# Patient Record
Sex: FEMALE | Race: WHITE | Employment: FULL TIME | URBAN - METROPOLITAN AREA
[De-identification: names, ages, dates, MRNs, and addresses within clinical notes are randomized per-mention and may not be internally consistent; named-entity substitution may affect disease eponyms.]

---

## 2017-01-23 DIAGNOSIS — K21.9 GASTROESOPHAGEAL REFLUX DISEASE WITHOUT ESOPHAGITIS: ICD-10-CM

## 2017-01-23 RX ORDER — ESOMEPRAZOLE MAGNESIUM 40 MG/1
CAPSULE, DELAYED RELEASE ORAL
Qty: 60 CAPSULE | Refills: 5 | Status: SHIPPED | OUTPATIENT
Start: 2017-01-23 | End: 2018-02-05 | Stop reason: SDUPTHER

## 2017-01-26 RX ORDER — TRAMADOL HYDROCHLORIDE 50 MG/1
100 TABLET ORAL 3 TIMES DAILY
Qty: 180 TABLET | Refills: 1 | OUTPATIENT
Start: 2017-01-26 | End: 2017-03-28 | Stop reason: SDUPTHER

## 2017-02-01 ENCOUNTER — OFFICE VISIT (OUTPATIENT)
Dept: FAMILY MEDICINE CLINIC | Age: 62
End: 2017-02-01

## 2017-02-01 VITALS
WEIGHT: 164.25 LBS | RESPIRATION RATE: 16 BRPM | SYSTOLIC BLOOD PRESSURE: 190 MMHG | DIASTOLIC BLOOD PRESSURE: 94 MMHG | HEIGHT: 65 IN | HEART RATE: 86 BPM | BODY MASS INDEX: 27.36 KG/M2

## 2017-02-01 DIAGNOSIS — M70.62 TROCHANTERIC BURSITIS OF LEFT HIP: Primary | ICD-10-CM

## 2017-02-01 DIAGNOSIS — I10 ESSENTIAL HYPERTENSION: ICD-10-CM

## 2017-02-01 DIAGNOSIS — M17.11 ARTHRITIS OF RIGHT KNEE: ICD-10-CM

## 2017-02-01 DIAGNOSIS — M53.3 PAIN OF LEFT SACROILIAC JOINT: ICD-10-CM

## 2017-02-01 PROCEDURE — 99213 OFFICE O/P EST LOW 20 MIN: CPT | Performed by: FAMILY MEDICINE

## 2017-02-01 RX ORDER — OXYCODONE HYDROCHLORIDE AND ACETAMINOPHEN 5; 325 MG/1; MG/1
1 TABLET ORAL EVERY 4 HOURS PRN
Status: ON HOLD | COMMUNITY
Start: 2016-12-16 | End: 2017-05-04 | Stop reason: HOSPADM

## 2017-02-01 RX ORDER — METAXALONE 800 MG/1
800 TABLET ORAL 3 TIMES DAILY
Qty: 40 TABLET | Refills: 1 | Status: SHIPPED | OUTPATIENT
Start: 2017-02-01 | End: 2017-03-28 | Stop reason: SDUPTHER

## 2017-02-01 RX ORDER — PREDNISONE 20 MG/1
TABLET ORAL
Qty: 24 TABLET | Refills: 0 | Status: SHIPPED | OUTPATIENT
Start: 2017-02-01 | End: 2017-03-08

## 2017-02-01 ASSESSMENT — ENCOUNTER SYMPTOMS
SHORTNESS OF BREATH: 0
BACK PAIN: 1
NAUSEA: 0
EYE PAIN: 0
CONSTIPATION: 0
ABDOMINAL PAIN: 0
WHEEZING: 0
COUGH: 0
ORTHOPNEA: 0
CHEST TIGHTNESS: 0
SORE THROAT: 0

## 2017-02-01 ASSESSMENT — PATIENT HEALTH QUESTIONNAIRE - PHQ9
8. MOVING OR SPEAKING SO SLOWLY THAT OTHER PEOPLE COULD HAVE NOTICED. OR THE OPPOSITE, BEING SO FIGETY OR RESTLESS THAT YOU HAVE BEEN MOVING AROUND A LOT MORE THAN USUAL: 0
3. TROUBLE FALLING OR STAYING ASLEEP: 3
SUM OF ALL RESPONSES TO PHQ9 QUESTIONS 1 & 2: 3
7. TROUBLE CONCENTRATING ON THINGS, SUCH AS READING THE NEWSPAPER OR WATCHING TELEVISION: 1
6. FEELING BAD ABOUT YOURSELF - OR THAT YOU ARE A FAILURE OR HAVE LET YOURSELF OR YOUR FAMILY DOWN: 0
4. FEELING TIRED OR HAVING LITTLE ENERGY: 0
SUM OF ALL RESPONSES TO PHQ QUESTIONS 1-9: 7
9. THOUGHTS THAT YOU WOULD BE BETTER OFF DEAD, OR OF HURTING YOURSELF: 0
10. IF YOU CHECKED OFF ANY PROBLEMS, HOW DIFFICULT HAVE THESE PROBLEMS MADE IT FOR YOU TO DO YOUR WORK, TAKE CARE OF THINGS AT HOME, OR GET ALONG WITH OTHER PEOPLE: 0
5. POOR APPETITE OR OVEREATING: 0
2. FEELING DOWN, DEPRESSED OR HOPELESS: 2
1. LITTLE INTEREST OR PLEASURE IN DOING THINGS: 1

## 2017-02-02 ENCOUNTER — OFFICE VISIT (OUTPATIENT)
Dept: CARDIOLOGY | Age: 62
End: 2017-02-02

## 2017-02-02 VITALS — SYSTOLIC BLOOD PRESSURE: 174 MMHG | DIASTOLIC BLOOD PRESSURE: 108 MMHG | HEART RATE: 88 BPM

## 2017-02-02 DIAGNOSIS — I10 ESSENTIAL HYPERTENSION: Primary | ICD-10-CM

## 2017-02-02 DIAGNOSIS — E78.01 FAMILIAL HYPERCHOLESTEROLEMIA: ICD-10-CM

## 2017-02-02 PROCEDURE — 99213 OFFICE O/P EST LOW 20 MIN: CPT | Performed by: NUCLEAR MEDICINE

## 2017-02-02 RX ORDER — HYDROCHLOROTHIAZIDE 25 MG/1
25 TABLET ORAL EVERY OTHER DAY
Qty: 45 TABLET | Refills: 3 | Status: SHIPPED | OUTPATIENT
Start: 2017-02-02 | End: 2018-02-05 | Stop reason: SDUPTHER

## 2017-02-09 ENCOUNTER — TELEPHONE (OUTPATIENT)
Dept: FAMILY MEDICINE CLINIC | Age: 62
End: 2017-02-09

## 2017-02-09 RX ORDER — GABAPENTIN 300 MG/1
CAPSULE ORAL
Qty: 90 CAPSULE | Refills: 3 | Status: SHIPPED | OUTPATIENT
Start: 2017-02-09 | End: 2017-12-29 | Stop reason: SDUPTHER

## 2017-03-01 ENCOUNTER — TELEPHONE (OUTPATIENT)
Dept: FAMILY MEDICINE CLINIC | Age: 62
End: 2017-03-01

## 2017-03-01 DIAGNOSIS — M19.90 ARTHRITIS: Primary | ICD-10-CM

## 2017-03-08 ENCOUNTER — OFFICE VISIT (OUTPATIENT)
Dept: FAMILY MEDICINE CLINIC | Age: 62
End: 2017-03-08

## 2017-03-08 VITALS
HEART RATE: 80 BPM | SYSTOLIC BLOOD PRESSURE: 156 MMHG | DIASTOLIC BLOOD PRESSURE: 90 MMHG | RESPIRATION RATE: 10 BRPM | HEIGHT: 65 IN | WEIGHT: 165 LBS | BODY MASS INDEX: 27.49 KG/M2

## 2017-03-08 DIAGNOSIS — M54.42 ACUTE BILATERAL LOW BACK PAIN WITH BILATERAL SCIATICA: Primary | ICD-10-CM

## 2017-03-08 DIAGNOSIS — M54.41 ACUTE BILATERAL LOW BACK PAIN WITH BILATERAL SCIATICA: Primary | ICD-10-CM

## 2017-03-08 DIAGNOSIS — I10 ESSENTIAL HYPERTENSION: ICD-10-CM

## 2017-03-08 DIAGNOSIS — M25.561 ACUTE PAIN OF RIGHT KNEE: ICD-10-CM

## 2017-03-08 PROCEDURE — 99213 OFFICE O/P EST LOW 20 MIN: CPT | Performed by: FAMILY MEDICINE

## 2017-03-08 ASSESSMENT — ENCOUNTER SYMPTOMS
CHEST TIGHTNESS: 0
ABDOMINAL PAIN: 0
BACK PAIN: 1
CONSTIPATION: 0
SHORTNESS OF BREATH: 0
EYE PAIN: 0
NAUSEA: 0
SORE THROAT: 0
WHEEZING: 0
COUGH: 0

## 2017-03-20 ENCOUNTER — TELEPHONE (OUTPATIENT)
Dept: FAMILY MEDICINE CLINIC | Age: 62
End: 2017-03-20

## 2017-03-20 RX ORDER — AMOXICILLIN AND CLAVULANATE POTASSIUM 875; 125 MG/1; MG/1
1 TABLET, FILM COATED ORAL 2 TIMES DAILY
Qty: 20 TABLET | Refills: 0 | Status: SHIPPED | OUTPATIENT
Start: 2017-03-20 | End: 2017-03-30

## 2017-03-28 DIAGNOSIS — M70.62 TROCHANTERIC BURSITIS OF LEFT HIP: ICD-10-CM

## 2017-03-28 DIAGNOSIS — M53.3 PAIN OF LEFT SACROILIAC JOINT: ICD-10-CM

## 2017-03-28 RX ORDER — TRAMADOL HYDROCHLORIDE 50 MG/1
100 TABLET, COATED ORAL 3 TIMES DAILY
Qty: 180 TABLET | Refills: 1 | Status: SHIPPED | OUTPATIENT
Start: 2017-03-28 | End: 2017-05-30 | Stop reason: SDUPTHER

## 2017-03-28 RX ORDER — METAXALONE 800 MG/1
800 TABLET ORAL 3 TIMES DAILY
Qty: 40 TABLET | Refills: 1 | Status: SHIPPED | OUTPATIENT
Start: 2017-03-28 | End: 2017-05-16 | Stop reason: SDUPTHER

## 2017-04-17 ENCOUNTER — OFFICE VISIT (OUTPATIENT)
Dept: FAMILY MEDICINE CLINIC | Age: 62
End: 2017-04-17

## 2017-04-17 VITALS
BODY MASS INDEX: 27.49 KG/M2 | SYSTOLIC BLOOD PRESSURE: 150 MMHG | RESPIRATION RATE: 12 BRPM | DIASTOLIC BLOOD PRESSURE: 86 MMHG | WEIGHT: 165 LBS | HEART RATE: 68 BPM | HEIGHT: 65 IN

## 2017-04-17 DIAGNOSIS — E78.00 PURE HYPERCHOLESTEROLEMIA: ICD-10-CM

## 2017-04-17 DIAGNOSIS — M17.11 PRIMARY OSTEOARTHRITIS OF RIGHT KNEE: Primary | ICD-10-CM

## 2017-04-17 DIAGNOSIS — I10 ESSENTIAL HYPERTENSION: ICD-10-CM

## 2017-04-17 DIAGNOSIS — K21.9 GASTROESOPHAGEAL REFLUX DISEASE WITHOUT ESOPHAGITIS: ICD-10-CM

## 2017-04-17 PROBLEM — M23.329 DERANGEMENT OF MEDIAL MENISCUS, POSTERIOR HORN: Status: ACTIVE | Noted: 2017-04-17

## 2017-04-17 PROBLEM — M23.359: Status: ACTIVE | Noted: 2017-04-17

## 2017-04-17 PROBLEM — M00.061 STAPHYLOCOCCAL ARTHRITIS OF RIGHT KNEE (HCC): Status: ACTIVE | Noted: 2017-04-17

## 2017-04-17 PROBLEM — M17.9 ARTHRITIS OF KNEE, DEGENERATIVE: Status: ACTIVE | Noted: 2017-04-17

## 2017-04-17 PROCEDURE — 99214 OFFICE O/P EST MOD 30 MIN: CPT | Performed by: FAMILY MEDICINE

## 2017-04-17 ASSESSMENT — ENCOUNTER SYMPTOMS
ABDOMINAL PAIN: 0
EYE PAIN: 0
CHOKING: 0
SORE THROAT: 1
WHEEZING: 0
HOARSE VOICE: 0
NAUSEA: 0
ORTHOPNEA: 0
BELCHING: 0
COUGH: 0
CHEST TIGHTNESS: 0
CONSTIPATION: 0
SHORTNESS OF BREATH: 0

## 2017-05-11 ENCOUNTER — OFFICE VISIT (OUTPATIENT)
Dept: FAMILY MEDICINE CLINIC | Age: 62
End: 2017-05-11

## 2017-05-11 VITALS
SYSTOLIC BLOOD PRESSURE: 124 MMHG | RESPIRATION RATE: 18 BRPM | BODY MASS INDEX: 27.49 KG/M2 | DIASTOLIC BLOOD PRESSURE: 70 MMHG | WEIGHT: 165 LBS | HEIGHT: 65 IN | HEART RATE: 80 BPM

## 2017-05-11 DIAGNOSIS — E78.00 PURE HYPERCHOLESTEROLEMIA: ICD-10-CM

## 2017-05-11 DIAGNOSIS — M17.11 PRIMARY OSTEOARTHRITIS OF RIGHT KNEE: ICD-10-CM

## 2017-05-11 DIAGNOSIS — I10 ESSENTIAL HYPERTENSION: Primary | ICD-10-CM

## 2017-05-11 DIAGNOSIS — M00.9 INFECTION OF RIGHT KNEE (HCC): ICD-10-CM

## 2017-05-11 PROCEDURE — 99213 OFFICE O/P EST LOW 20 MIN: CPT | Performed by: FAMILY MEDICINE

## 2017-05-11 ASSESSMENT — ENCOUNTER SYMPTOMS
SORE THROAT: 0
EYE PAIN: 0
NAUSEA: 0
ABDOMINAL PAIN: 0
WHEEZING: 0
SHORTNESS OF BREATH: 0
CHEST TIGHTNESS: 0
ORTHOPNEA: 0
CONSTIPATION: 0
COUGH: 0

## 2017-05-16 DIAGNOSIS — M53.3 PAIN OF LEFT SACROILIAC JOINT: ICD-10-CM

## 2017-05-16 DIAGNOSIS — M70.62 TROCHANTERIC BURSITIS OF LEFT HIP: ICD-10-CM

## 2017-05-16 RX ORDER — METAXALONE 800 MG/1
800 TABLET ORAL 3 TIMES DAILY
Qty: 40 TABLET | Refills: 1 | Status: SHIPPED | OUTPATIENT
Start: 2017-05-16 | End: 2017-06-20 | Stop reason: SDUPTHER

## 2017-05-31 RX ORDER — VALSARTAN 40 MG/1
40 TABLET ORAL 2 TIMES DAILY
Qty: 60 TABLET | Refills: 5 | Status: SHIPPED | OUTPATIENT
Start: 2017-05-31 | End: 2018-02-02

## 2017-05-31 RX ORDER — TRAMADOL HYDROCHLORIDE 50 MG/1
100 TABLET, COATED ORAL 3 TIMES DAILY
Qty: 180 TABLET | Refills: 1 | Status: SHIPPED | OUTPATIENT
Start: 2017-05-31 | End: 2017-07-30 | Stop reason: SDUPTHER

## 2017-06-19 ENCOUNTER — TELEPHONE (OUTPATIENT)
Dept: FAMILY MEDICINE CLINIC | Age: 62
End: 2017-06-19

## 2017-06-19 DIAGNOSIS — I10 ESSENTIAL HYPERTENSION: Primary | ICD-10-CM

## 2017-06-20 ENCOUNTER — TELEPHONE (OUTPATIENT)
Dept: FAMILY MEDICINE CLINIC | Age: 62
End: 2017-06-20

## 2017-06-20 DIAGNOSIS — M53.3 PAIN OF LEFT SACROILIAC JOINT: ICD-10-CM

## 2017-06-20 DIAGNOSIS — M70.62 TROCHANTERIC BURSITIS OF LEFT HIP: ICD-10-CM

## 2017-06-21 RX ORDER — METAXALONE 800 MG/1
800 TABLET ORAL 3 TIMES DAILY
Qty: 100 TABLET | Refills: 1 | Status: SHIPPED | OUTPATIENT
Start: 2017-06-21 | End: 2017-08-22 | Stop reason: SDUPTHER

## 2017-07-06 ENCOUNTER — TELEPHONE (OUTPATIENT)
Dept: FAMILY MEDICINE CLINIC | Age: 62
End: 2017-07-06

## 2017-08-01 RX ORDER — TRAMADOL HYDROCHLORIDE 50 MG/1
TABLET, COATED ORAL
Qty: 180 TABLET | Refills: 1 | Status: SHIPPED | OUTPATIENT
Start: 2017-08-01 | End: 2017-10-04 | Stop reason: SDUPTHER

## 2017-08-02 ENCOUNTER — TELEPHONE (OUTPATIENT)
Dept: FAMILY MEDICINE CLINIC | Age: 62
End: 2017-08-02

## 2017-08-02 DIAGNOSIS — M19.90 ARTHRITIS: Primary | ICD-10-CM

## 2017-08-03 RX ORDER — PREDNISONE 20 MG/1
TABLET ORAL
Qty: 24 TABLET | Refills: 0 | Status: SHIPPED | OUTPATIENT
Start: 2017-08-03 | End: 2017-11-07

## 2017-08-22 DIAGNOSIS — M70.62 TROCHANTERIC BURSITIS OF LEFT HIP: ICD-10-CM

## 2017-08-22 DIAGNOSIS — M53.3 PAIN OF LEFT SACROILIAC JOINT: ICD-10-CM

## 2017-08-23 RX ORDER — METAXALONE 800 MG/1
TABLET ORAL
Qty: 100 TABLET | Refills: 0 | Status: SHIPPED | OUTPATIENT
Start: 2017-08-23 | End: 2017-09-21 | Stop reason: SDUPTHER

## 2017-09-20 ENCOUNTER — TELEPHONE (OUTPATIENT)
Dept: FAMILY MEDICINE CLINIC | Age: 62
End: 2017-09-20

## 2017-09-20 DIAGNOSIS — N30.00 ACUTE CYSTITIS WITHOUT HEMATURIA: Primary | ICD-10-CM

## 2017-09-20 RX ORDER — NITROFURANTOIN 25; 75 MG/1; MG/1
100 CAPSULE ORAL 2 TIMES DAILY
Qty: 20 CAPSULE | Refills: 0 | Status: SHIPPED | OUTPATIENT
Start: 2017-09-20 | End: 2017-09-22 | Stop reason: SINTOL

## 2017-09-21 DIAGNOSIS — M53.3 PAIN OF LEFT SACROILIAC JOINT: ICD-10-CM

## 2017-09-21 DIAGNOSIS — M70.62 TROCHANTERIC BURSITIS OF LEFT HIP: ICD-10-CM

## 2017-09-21 RX ORDER — METAXALONE 800 MG/1
TABLET ORAL
Qty: 90 TABLET | Refills: 1 | Status: SHIPPED | OUTPATIENT
Start: 2017-09-21 | End: 2017-11-27 | Stop reason: SDUPTHER

## 2017-09-22 ENCOUNTER — TELEPHONE (OUTPATIENT)
Dept: FAMILY MEDICINE CLINIC | Age: 62
End: 2017-09-22

## 2017-09-22 RX ORDER — CEFUROXIME AXETIL 250 MG/1
250 TABLET ORAL 2 TIMES DAILY
Qty: 14 TABLET | Refills: 0 | Status: SHIPPED | OUTPATIENT
Start: 2017-09-22 | End: 2017-09-29

## 2017-10-04 RX ORDER — TRAMADOL HYDROCHLORIDE 50 MG/1
TABLET, COATED ORAL
Qty: 180 TABLET | Refills: 1 | Status: SHIPPED | OUTPATIENT
Start: 2017-10-04 | End: 2017-12-03 | Stop reason: SDUPTHER

## 2017-10-11 ENCOUNTER — TELEPHONE (OUTPATIENT)
Dept: FAMILY MEDICINE CLINIC | Age: 62
End: 2017-10-11

## 2017-10-11 RX ORDER — PREDNISONE 20 MG/1
TABLET ORAL
Qty: 24 TABLET | Refills: 0 | Status: SHIPPED | OUTPATIENT
Start: 2017-10-11 | End: 2017-11-07

## 2017-10-13 NOTE — TELEPHONE ENCOUNTER
Date of last visit:  10/11/2017  Date of next visit:  Visit date not found    Requested Prescriptions     Pending Prescriptions Disp Refills    ipratropium (ATROVENT) 0.06 % nasal spray 1 Bottle 5     Si sprays by Nasal route 2 times daily

## 2017-10-14 RX ORDER — IPRATROPIUM BROMIDE 42 UG/1
2 SPRAY, METERED NASAL 2 TIMES DAILY
Qty: 1 BOTTLE | Refills: 5 | Status: SHIPPED | OUTPATIENT
Start: 2017-10-14 | End: 2018-09-11 | Stop reason: SDUPTHER

## 2017-11-07 ENCOUNTER — OFFICE VISIT (OUTPATIENT)
Dept: FAMILY MEDICINE CLINIC | Age: 62
End: 2017-11-07

## 2017-11-07 VITALS
BODY MASS INDEX: 26.52 KG/M2 | HEIGHT: 66 IN | HEART RATE: 68 BPM | RESPIRATION RATE: 14 BRPM | SYSTOLIC BLOOD PRESSURE: 130 MMHG | DIASTOLIC BLOOD PRESSURE: 70 MMHG | WEIGHT: 165 LBS

## 2017-11-07 DIAGNOSIS — M51.16 LUMBAR DISC DISEASE WITH RADICULOPATHY: ICD-10-CM

## 2017-11-07 DIAGNOSIS — E78.00 PURE HYPERCHOLESTEROLEMIA: ICD-10-CM

## 2017-11-07 DIAGNOSIS — M17.11 PRIMARY OSTEOARTHRITIS OF RIGHT KNEE: ICD-10-CM

## 2017-11-07 DIAGNOSIS — K21.9 GASTROESOPHAGEAL REFLUX DISEASE WITHOUT ESOPHAGITIS: ICD-10-CM

## 2017-11-07 DIAGNOSIS — I10 ESSENTIAL HYPERTENSION: Primary | ICD-10-CM

## 2017-11-07 PROCEDURE — 99213 OFFICE O/P EST LOW 20 MIN: CPT | Performed by: FAMILY MEDICINE

## 2017-11-07 PROCEDURE — 96372 THER/PROPH/DIAG INJ SC/IM: CPT | Performed by: FAMILY MEDICINE

## 2017-11-07 RX ORDER — METHYLPREDNISOLONE ACETATE 80 MG/ML
80 INJECTION, SUSPENSION INTRA-ARTICULAR; INTRALESIONAL; INTRAMUSCULAR; SOFT TISSUE ONCE
Status: COMPLETED | OUTPATIENT
Start: 2017-11-07 | End: 2017-11-07

## 2017-11-07 RX ORDER — METHYLPREDNISOLONE ACETATE 40 MG/ML
40 INJECTION, SUSPENSION INTRA-ARTICULAR; INTRALESIONAL; INTRAMUSCULAR; SOFT TISSUE ONCE
Status: COMPLETED | OUTPATIENT
Start: 2017-11-07 | End: 2017-11-07

## 2017-11-07 RX ORDER — HYDROCODONE BITARTRATE AND ACETAMINOPHEN 5; 325 MG/1; MG/1
TABLET ORAL
Refills: 0 | COMMUNITY
Start: 2017-10-30 | End: 2019-02-07

## 2017-11-07 RX ADMIN — METHYLPREDNISOLONE ACETATE 40 MG: 40 INJECTION, SUSPENSION INTRA-ARTICULAR; INTRALESIONAL; INTRAMUSCULAR; SOFT TISSUE at 13:10

## 2017-11-07 RX ADMIN — METHYLPREDNISOLONE ACETATE 80 MG: 80 INJECTION, SUSPENSION INTRA-ARTICULAR; INTRALESIONAL; INTRAMUSCULAR; SOFT TISSUE at 13:10

## 2017-11-07 ASSESSMENT — ENCOUNTER SYMPTOMS
SORE THROAT: 0
ORTHOPNEA: 0
EYE PAIN: 0
WHEEZING: 0
COUGH: 0
CHEST TIGHTNESS: 0
SHORTNESS OF BREATH: 0
CONSTIPATION: 0
NAUSEA: 0
ABDOMINAL PAIN: 0

## 2017-11-27 DIAGNOSIS — M53.3 PAIN OF LEFT SACROILIAC JOINT: ICD-10-CM

## 2017-11-27 DIAGNOSIS — M70.62 TROCHANTERIC BURSITIS OF LEFT HIP: ICD-10-CM

## 2017-11-27 RX ORDER — METAXALONE 800 MG/1
TABLET ORAL
Qty: 90 TABLET | Refills: 3 | Status: SHIPPED | OUTPATIENT
Start: 2017-11-27 | End: 2022-05-25

## 2017-11-27 NOTE — TELEPHONE ENCOUNTER
Date of last visit:  11/7/2017  Date of next visit:  Visit date not found    Requested Prescriptions     Signed Prescriptions Disp Refills    metaxalone (SKELAXIN) 800 MG tablet 90 tablet 3     Sig: TAKE 1 TABLET BY MOUTH 3 TIMES DAILY     Authorizing Provider: Elia Gray     Ordering User: FABBY KINCAID     SENT OVER SCRIPT PER VERBAL ORDER FROM DR BUCIO TO [facility] ON [address]

## 2017-11-29 ENCOUNTER — TELEPHONE (OUTPATIENT)
Dept: FAMILY MEDICINE CLINIC | Age: 62
End: 2017-11-29

## 2017-11-29 DIAGNOSIS — N76.0 ACUTE VAGINITIS: Primary | ICD-10-CM

## 2017-11-29 DIAGNOSIS — M19.90 ARTHRITIS: ICD-10-CM

## 2017-11-29 RX ORDER — FLUCONAZOLE 150 MG/1
TABLET ORAL
Qty: 2 TABLET | Refills: 1 | Status: SHIPPED | OUTPATIENT
Start: 2017-11-29 | End: 2018-02-09 | Stop reason: ALTCHOICE

## 2017-11-29 RX ORDER — PREDNISONE 20 MG/1
TABLET ORAL
Qty: 24 TABLET | Refills: 0 | Status: SHIPPED | OUTPATIENT
Start: 2017-11-29 | End: 2018-02-02

## 2017-12-02 ENCOUNTER — HOSPITAL ENCOUNTER (OUTPATIENT)
Age: 62
Discharge: HOME | End: 2017-12-02
Payer: COMMERCIAL

## 2017-12-02 DIAGNOSIS — I10 ESSENTIAL HYPERTENSION: ICD-10-CM

## 2017-12-02 DIAGNOSIS — M17.11 PRIMARY OSTEOARTHRITIS OF RIGHT KNEE: ICD-10-CM

## 2017-12-02 DIAGNOSIS — E78.00 PURE HYPERCHOLESTEROLEMIA: ICD-10-CM

## 2017-12-02 LAB
ALBUMIN SERPL-MCNC: 3.6 G/DL (ref 3.5–5.1)
ALP BLD-CCNC: 55 U/L (ref 38–126)
ALT SERPL-CCNC: 16 U/L (ref 11–66)
ANION GAP SERPL CALCULATED.3IONS-SCNC: 14 MEQ/L (ref 8–16)
ANISOCYTOSIS: ABNORMAL
AST SERPL-CCNC: 28 U/L (ref 5–40)
BASOPHILS # BLD: 0.8 %
BASOPHILS ABSOLUTE: 0 THOU/MM3 (ref 0–0.1)
BILIRUB SERPL-MCNC: 0.2 MG/DL (ref 0.3–1.2)
BUN BLDV-MCNC: 27 MG/DL (ref 7–22)
C-REACTIVE PROTEIN: 0.4 MG/DL (ref 0–1)
CALCIUM SERPL-MCNC: 8.7 MG/DL (ref 8.5–10.5)
CHLORIDE BLD-SCNC: 97 MEQ/L (ref 98–111)
CHOLESTEROL, TOTAL: 255 MG/DL (ref 100–199)
CO2: 27 MEQ/L (ref 23–33)
CREAT SERPL-MCNC: 0.6 MG/DL (ref 0.4–1.2)
EOSINOPHIL # BLD: 2.4 %
EOSINOPHILS ABSOLUTE: 0.1 THOU/MM3 (ref 0–0.4)
GFR SERPL CREATININE-BSD FRML MDRD: > 90 ML/MIN/1.73M2
GLUCOSE BLD-MCNC: 81 MG/DL (ref 70–108)
HCT VFR BLD CALC: 35.5 % (ref 37–47)
HDLC SERPL-MCNC: 110 MG/DL
HEMOGLOBIN: 11.7 GM/DL (ref 12–16)
LDL CHOLESTEROL CALCULATED: 131 MG/DL
LYMPHOCYTES # BLD: 29.6 %
LYMPHOCYTES ABSOLUTE: 1.3 THOU/MM3 (ref 1–4.8)
MCH RBC QN AUTO: 28.7 PG (ref 27–31)
MCHC RBC AUTO-ENTMCNC: 32.9 GM/DL (ref 33–37)
MCV RBC AUTO: 87.1 FL (ref 81–99)
MONOCYTES # BLD: 11.1 %
MONOCYTES ABSOLUTE: 0.5 THOU/MM3 (ref 0.4–1.3)
NUCLEATED RED BLOOD CELLS: 0 /100 WBC
PDW BLD-RTO: 15.3 % (ref 11.5–14.5)
PLATELET # BLD: 291 THOU/MM3 (ref 130–400)
PMV BLD AUTO: 7.3 MCM (ref 7.4–10.4)
POTASSIUM SERPL-SCNC: 3.9 MEQ/L (ref 3.5–5.2)
RBC # BLD: 4.08 MILL/MM3 (ref 4.2–5.4)
RHEUMATOID FACTOR: < 10 IU/ML (ref 0–13)
SEDIMENTATION RATE, ERYTHROCYTE: 3 MM/HR (ref 0–20)
SEG NEUTROPHILS: 56.1 %
SEGMENTED NEUTROPHILS ABSOLUTE COUNT: 2.5 THOU/MM3 (ref 1.8–7.7)
SODIUM BLD-SCNC: 138 MEQ/L (ref 135–145)
TOTAL PROTEIN: 6 G/DL (ref 6.1–8)
TRIGL SERPL-MCNC: 68 MG/DL (ref 0–199)
TSH SERPL DL<=0.05 MIU/L-ACNC: 2.48 UIU/ML (ref 0.4–4.2)
URIC ACID: 1.7 MG/DL (ref 2.4–5.7)
WBC # BLD: 4.5 THOU/MM3 (ref 4.8–10.8)

## 2017-12-02 PROCEDURE — 84550 ASSAY OF BLOOD/URIC ACID: CPT

## 2017-12-02 PROCEDURE — 86430 RHEUMATOID FACTOR TEST QUAL: CPT

## 2017-12-02 PROCEDURE — 86140 C-REACTIVE PROTEIN: CPT

## 2017-12-02 PROCEDURE — 80061 LIPID PANEL: CPT

## 2017-12-02 PROCEDURE — 85651 RBC SED RATE NONAUTOMATED: CPT

## 2017-12-02 PROCEDURE — 36415 COLL VENOUS BLD VENIPUNCTURE: CPT

## 2017-12-02 PROCEDURE — 80050 GENERAL HEALTH PANEL: CPT

## 2017-12-04 ENCOUNTER — TELEPHONE (OUTPATIENT)
Dept: FAMILY MEDICINE CLINIC | Age: 62
End: 2017-12-04

## 2017-12-05 RX ORDER — TRAMADOL HYDROCHLORIDE 50 MG/1
TABLET, COATED ORAL
Qty: 180 TABLET | Refills: 1 | Status: SHIPPED | OUTPATIENT
Start: 2017-12-05 | End: 2018-01-05 | Stop reason: SDUPTHER

## 2017-12-29 DIAGNOSIS — I10 ESSENTIAL HYPERTENSION: ICD-10-CM

## 2017-12-29 RX ORDER — GABAPENTIN 300 MG/1
300 CAPSULE ORAL 2 TIMES DAILY
Qty: 60 CAPSULE | Refills: 5 | Status: SHIPPED | OUTPATIENT
Start: 2017-12-29 | End: 2019-03-13 | Stop reason: SDUPTHER

## 2017-12-29 RX ORDER — POTASSIUM CHLORIDE 750 MG/1
10 TABLET, EXTENDED RELEASE ORAL DAILY
Qty: 30 TABLET | Refills: 5 | Status: SHIPPED | OUTPATIENT
Start: 2017-12-29 | End: 2018-02-02 | Stop reason: SDUPTHER

## 2017-12-29 NOTE — TELEPHONE ENCOUNTER
Patient called in and requested #60 refills of:    Gabapentin 300mg, 1 tablet BID  Klor-Con 10meq, 1 tablet daily    Please send to [facility] [address].

## 2018-01-05 ENCOUNTER — TELEPHONE (OUTPATIENT)
Dept: FAMILY MEDICINE CLINIC | Age: 63
End: 2018-01-05

## 2018-01-05 DIAGNOSIS — M25.561 CHRONIC PAIN OF BOTH KNEES: Primary | ICD-10-CM

## 2018-01-05 DIAGNOSIS — G89.29 CHRONIC PAIN OF BOTH KNEES: Primary | ICD-10-CM

## 2018-01-05 DIAGNOSIS — M25.562 CHRONIC PAIN OF BOTH KNEES: Primary | ICD-10-CM

## 2018-01-05 DIAGNOSIS — M47.812 DEGENERATIVE JOINT DISEASE OF CERVICAL AND LUMBAR SPINE: Chronic | ICD-10-CM

## 2018-01-05 DIAGNOSIS — M47.816 DEGENERATIVE JOINT DISEASE OF CERVICAL AND LUMBAR SPINE: Chronic | ICD-10-CM

## 2018-01-05 RX ORDER — CEFDINIR 300 MG/1
300 CAPSULE ORAL 2 TIMES DAILY
Qty: 20 CAPSULE | Refills: 0 | Status: SHIPPED | OUTPATIENT
Start: 2018-01-05 | End: 2018-01-15

## 2018-01-05 NOTE — TELEPHONE ENCOUNTER
Date of last visit:  11/7/2017  Date of next visit:  Visit date not found    Requested Prescriptions     Pending Prescriptions Disp Refills    traMADol (ULTRAM) 50 MG tablet 180 tablet 1     Sig: TAKE 2 TABLETS BY MOUTH 3 TIMES DAILY.      Signed Prescriptions Disp Refills    cefdinir (OMNICEF) 300 MG capsule 20 capsule 0     Sig: Take 1 capsule by mouth 2 times daily for 10 days     Authorizing Provider: Cuca Ingram     Ordering User: Mynor Hernandez over the [address] for a Sinus Infection per verbal order from Dr Ivory Rea and phoned in the [facility]

## 2018-01-08 PROBLEM — M47.816 DEGENERATIVE JOINT DISEASE OF CERVICAL AND LUMBAR SPINE: Chronic | Status: ACTIVE | Noted: 2018-01-08

## 2018-01-08 PROBLEM — M47.812 DEGENERATIVE JOINT DISEASE OF CERVICAL AND LUMBAR SPINE: Chronic | Status: ACTIVE | Noted: 2018-01-08

## 2018-01-08 RX ORDER — TRAMADOL HYDROCHLORIDE 50 MG/1
TABLET ORAL
Qty: 180 TABLET | Refills: 1 | OUTPATIENT
Start: 2018-01-08 | End: 2018-02-05 | Stop reason: SDUPTHER

## 2018-02-02 ENCOUNTER — OFFICE VISIT (OUTPATIENT)
Dept: FAMILY MEDICINE CLINIC | Age: 63
End: 2018-02-02

## 2018-02-02 VITALS
DIASTOLIC BLOOD PRESSURE: 84 MMHG | HEIGHT: 66 IN | RESPIRATION RATE: 10 BRPM | WEIGHT: 168 LBS | BODY MASS INDEX: 27 KG/M2 | HEART RATE: 64 BPM | SYSTOLIC BLOOD PRESSURE: 144 MMHG

## 2018-02-02 DIAGNOSIS — I10 ESSENTIAL HYPERTENSION: Primary | ICD-10-CM

## 2018-02-02 DIAGNOSIS — K21.9 GASTROESOPHAGEAL REFLUX DISEASE WITHOUT ESOPHAGITIS: ICD-10-CM

## 2018-02-02 DIAGNOSIS — R60.9 PERIPHERAL EDEMA: ICD-10-CM

## 2018-02-02 PROCEDURE — 99213 OFFICE O/P EST LOW 20 MIN: CPT | Performed by: FAMILY MEDICINE

## 2018-02-02 RX ORDER — VALSARTAN 40 MG/1
40 TABLET ORAL DAILY
Qty: 60 TABLET | Refills: 5 | Status: SHIPPED | OUTPATIENT
Start: 2018-02-02 | End: 2018-10-15 | Stop reason: ALTCHOICE

## 2018-02-02 RX ORDER — FUROSEMIDE 20 MG/1
20 TABLET ORAL DAILY PRN
Qty: 60 TABLET | Refills: 1 | Status: SHIPPED | OUTPATIENT
Start: 2018-02-02 | End: 2018-05-23 | Stop reason: SDUPTHER

## 2018-02-02 RX ORDER — POTASSIUM CHLORIDE 750 MG/1
10 TABLET, EXTENDED RELEASE ORAL DAILY
Qty: 30 TABLET | Refills: 5 | Status: SHIPPED | OUTPATIENT
Start: 2018-02-02 | End: 2019-03-24 | Stop reason: SDUPTHER

## 2018-02-02 ASSESSMENT — ENCOUNTER SYMPTOMS
SHORTNESS OF BREATH: 0
CONSTIPATION: 0
COUGH: 0
EYE PAIN: 0
ABDOMINAL PAIN: 0
NAUSEA: 0
CHEST TIGHTNESS: 0
WHEEZING: 0
SORE THROAT: 0

## 2018-02-02 ASSESSMENT — PATIENT HEALTH QUESTIONNAIRE - PHQ9
SUM OF ALL RESPONSES TO PHQ QUESTIONS 1-9: 1
2. FEELING DOWN, DEPRESSED OR HOPELESS: 1
1. LITTLE INTEREST OR PLEASURE IN DOING THINGS: 0
SUM OF ALL RESPONSES TO PHQ9 QUESTIONS 1 & 2: 1

## 2018-02-02 NOTE — PROGRESS NOTES
5' 6\" (1.676 m)   Wt 168 lb (76.2 kg)   Breastfeeding? No   BMI 27.12 kg/m²   Objective:   Physical Exam   Constitutional: She is oriented to person, place, and time. She appears well-developed and well-nourished. HENT:   Head: Normocephalic and atraumatic. Right Ear: External ear normal.   Left Ear: External ear normal.   Nose: Nose normal.   Mouth/Throat: Oropharynx is clear and moist.   Eyes: Conjunctivae and EOM are normal. Pupils are equal, round, and reactive to light. No scleral icterus. Neck: Normal range of motion. Neck supple. No JVD present. No thyromegaly present. Cardiovascular: Normal rate, regular rhythm, normal heart sounds and intact distal pulses. Pulmonary/Chest: Effort normal and breath sounds normal. She has no wheezes. She has no rales. Abdominal: Soft. Bowel sounds are normal. She exhibits no distension and no mass. There is no tenderness. Musculoskeletal: Normal range of motion. She exhibits no tenderness. Edema:   2  to  3 + edema. Lymphadenopathy:     She has no cervical adenopathy. Neurological: She is alert and oriented to person, place, and time. She has normal reflexes. No cranial nerve deficit. Skin: Skin is warm and dry. No rash noted. Psychiatric: She has a normal mood and affect. Nursing note and vitals reviewed. Assessment:      1. Essential hypertension  valsartan (DIOVAN) 40 MG tablet    furosemide (LASIX) 20 MG tablet    potassium chloride (KLOR-CON M) 10 MEQ extended release tablet   2. Gastroesophageal reflux disease without esophagitis     3.  Peripheral edema           Plan:      Current Outpatient Prescriptions   Medication Sig Dispense Refill    valsartan (DIOVAN) 40 MG tablet Take 1 tablet by mouth daily 60 tablet 5    furosemide (LASIX) 20 MG tablet Take 1 tablet by mouth daily as needed (leg  edema) 60 tablet 1    potassium chloride (KLOR-CON M) 10 MEQ extended release tablet Take 1 tablet by mouth daily 30 tablet 5    traMADol

## 2018-02-05 DIAGNOSIS — G89.29 CHRONIC PAIN OF BOTH KNEES: ICD-10-CM

## 2018-02-05 DIAGNOSIS — M47.812 DEGENERATIVE JOINT DISEASE OF CERVICAL AND LUMBAR SPINE: Chronic | ICD-10-CM

## 2018-02-05 DIAGNOSIS — I10 ESSENTIAL HYPERTENSION: Primary | ICD-10-CM

## 2018-02-05 DIAGNOSIS — K21.9 GASTROESOPHAGEAL REFLUX DISEASE WITHOUT ESOPHAGITIS: ICD-10-CM

## 2018-02-05 DIAGNOSIS — M25.562 CHRONIC PAIN OF BOTH KNEES: ICD-10-CM

## 2018-02-05 DIAGNOSIS — M47.816 DEGENERATIVE JOINT DISEASE OF CERVICAL AND LUMBAR SPINE: Chronic | ICD-10-CM

## 2018-02-05 DIAGNOSIS — M25.561 CHRONIC PAIN OF BOTH KNEES: ICD-10-CM

## 2018-02-06 RX ORDER — ESOMEPRAZOLE MAGNESIUM 40 MG/1
CAPSULE, DELAYED RELEASE ORAL
Qty: 60 CAPSULE | Refills: 5 | Status: SHIPPED | OUTPATIENT
Start: 2018-02-06 | End: 2018-08-20 | Stop reason: SDUPTHER

## 2018-02-06 RX ORDER — TRAMADOL HYDROCHLORIDE 50 MG/1
TABLET ORAL
Qty: 180 TABLET | Refills: 1 | Status: SHIPPED | OUTPATIENT
Start: 2018-02-06 | End: 2018-04-06 | Stop reason: SDUPTHER

## 2018-02-06 RX ORDER — HYDROCHLOROTHIAZIDE 25 MG/1
25 TABLET ORAL EVERY OTHER DAY
Qty: 45 TABLET | Refills: 3 | Status: SHIPPED | OUTPATIENT
Start: 2018-02-06 | End: 2019-01-07 | Stop reason: SDUPTHER

## 2018-02-09 ENCOUNTER — OFFICE VISIT (OUTPATIENT)
Dept: CARDIOLOGY CLINIC | Age: 63
End: 2018-02-09
Payer: COMMERCIAL

## 2018-02-09 VITALS
WEIGHT: 172.6 LBS | DIASTOLIC BLOOD PRESSURE: 86 MMHG | BODY MASS INDEX: 27.86 KG/M2 | HEART RATE: 92 BPM | SYSTOLIC BLOOD PRESSURE: 148 MMHG

## 2018-02-09 DIAGNOSIS — E78.01 FAMILIAL HYPERCHOLESTEROLEMIA: ICD-10-CM

## 2018-02-09 DIAGNOSIS — I10 ESSENTIAL HYPERTENSION: Primary | ICD-10-CM

## 2018-02-09 PROCEDURE — 99213 OFFICE O/P EST LOW 20 MIN: CPT | Performed by: NUCLEAR MEDICINE

## 2018-02-09 NOTE — PROGRESS NOTES
Patient here for 1 yr fu for HTN  She is taking lasix every 3 days for SUSAN and HCTZ every other day
ECHOCARDIOGRAM  08/25/2005    EF 60% C TRACE MR AND MILD TR    UPPER GASTROINTESTINAL ENDOSCOPY  2000    [facility]     Family History   Problem Relation Age of Onset    Heart Disease Mother     Hypertension Mother     Heart Disease Father     Hypertension Father     Stroke Father      Social History   Substance Use Topics    Smoking status: Former Smoker     Years: 3.00     Quit date: 11/12/1975    Smokeless tobacco: Never Used    Alcohol use No      Current Outpatient Prescriptions   Medication Sig Dispense Refill    traMADol (ULTRAM) 50 MG tablet TAKE 2 TABLETS BY MOUTH 3 TIMES DAILY. 180 tablet 1    hydrochlorothiazide (HYDRODIURIL) 25 MG tablet Take 1 tablet by mouth every other day 45 tablet 3    esomeprazole (NEXIUM) 40 MG delayed release capsule TAKE 1 CAPSULE BY MOUTH 2 TIMES DAILY. 60 capsule 5    valsartan (DIOVAN) 40 MG tablet Take 1 tablet by mouth daily 60 tablet 5    furosemide (LASIX) 20 MG tablet Take 1 tablet by mouth daily as needed (leg  edema) 60 tablet 1    potassium chloride (KLOR-CON M) 10 MEQ extended release tablet Take 1 tablet by mouth daily (Patient taking differently: Take 10 mEq by mouth daily Taking every 3-4 days) 30 tablet 5    gabapentin (NEURONTIN) 300 MG capsule Take 1 capsule by mouth 2 times daily. (Patient taking differently: Take 300 mg by mouth nightly .) 60 capsule 5    oxaprozin (DAYPRO) 600 MG tablet Take 1 tablet by mouth 2 times daily 60 tablet 3    metaxalone (SKELAXIN) 800 MG tablet TAKE 1 TABLET BY MOUTH 3 TIMES DAILY 90 tablet 3    HYDROcodone-acetaminophen (NORCO) 5-325 MG per tablet TAKE 1 OR 2 TABLETS EVERY 6-8 HOURS AS NEEDED FOR PAIN  0    ipratropium (ATROVENT) 0.06 % nasal spray 2 sprays by Nasal route 2 times daily 1 Bottle 5    nitroGLYCERIN (NITROLINGUAL) 0.4 MG/SPRAY spray Place 1 spray under the tongue every 5 minutes as needed for Chest pain. 1 Bottle 1    Cholecalciferol (VITAMIN D3) 3000 UNITS TABS Take 5,000 Units by mouth daily.

## 2018-02-16 ENCOUNTER — TELEPHONE (OUTPATIENT)
Dept: FAMILY MEDICINE CLINIC | Age: 63
End: 2018-02-16

## 2018-02-16 DIAGNOSIS — M19.90 ARTHRITIS: ICD-10-CM

## 2018-02-16 RX ORDER — PREDNISONE 20 MG/1
TABLET ORAL
Qty: 24 TABLET | Refills: 0 | Status: SHIPPED | OUTPATIENT
Start: 2018-02-16 | End: 2018-08-13 | Stop reason: ALTCHOICE

## 2018-02-21 ENCOUNTER — TELEPHONE (OUTPATIENT)
Dept: CARDIOLOGY CLINIC | Age: 63
End: 2018-02-21

## 2018-04-06 DIAGNOSIS — M25.561 CHRONIC PAIN OF BOTH KNEES: ICD-10-CM

## 2018-04-06 DIAGNOSIS — M25.562 CHRONIC PAIN OF BOTH KNEES: ICD-10-CM

## 2018-04-06 DIAGNOSIS — M47.816 DEGENERATIVE JOINT DISEASE OF CERVICAL AND LUMBAR SPINE: Chronic | ICD-10-CM

## 2018-04-06 DIAGNOSIS — G89.29 CHRONIC PAIN OF BOTH KNEES: ICD-10-CM

## 2018-04-06 DIAGNOSIS — M47.812 DEGENERATIVE JOINT DISEASE OF CERVICAL AND LUMBAR SPINE: Chronic | ICD-10-CM

## 2018-04-07 RX ORDER — TRAMADOL HYDROCHLORIDE 50 MG/1
TABLET, COATED ORAL
Qty: 180 TABLET | Refills: 1 | Status: SHIPPED | OUTPATIENT
Start: 2018-04-07 | End: 2018-06-04 | Stop reason: SDUPTHER

## 2018-05-15 ENCOUNTER — TELEPHONE (OUTPATIENT)
Dept: FAMILY MEDICINE CLINIC | Age: 63
End: 2018-05-15

## 2018-05-15 DIAGNOSIS — M54.42 ACUTE MIDLINE LOW BACK PAIN WITH LEFT-SIDED SCIATICA: Primary | ICD-10-CM

## 2018-05-16 RX ORDER — PREDNISONE 20 MG/1
TABLET ORAL
Qty: 24 TABLET | Refills: 0 | Status: SHIPPED | OUTPATIENT
Start: 2018-05-16 | End: 2018-08-13 | Stop reason: ALTCHOICE

## 2018-05-23 DIAGNOSIS — I10 ESSENTIAL HYPERTENSION: ICD-10-CM

## 2018-05-24 RX ORDER — FUROSEMIDE 20 MG/1
20 TABLET ORAL DAILY PRN
Qty: 60 TABLET | Refills: 1 | Status: SHIPPED | OUTPATIENT
Start: 2018-05-24 | End: 2018-06-04 | Stop reason: SDUPTHER

## 2018-06-04 DIAGNOSIS — M47.812 DEGENERATIVE JOINT DISEASE OF CERVICAL AND LUMBAR SPINE: Chronic | ICD-10-CM

## 2018-06-04 DIAGNOSIS — G89.29 CHRONIC PAIN OF BOTH KNEES: ICD-10-CM

## 2018-06-04 DIAGNOSIS — I10 ESSENTIAL HYPERTENSION: ICD-10-CM

## 2018-06-04 DIAGNOSIS — M25.562 CHRONIC PAIN OF BOTH KNEES: ICD-10-CM

## 2018-06-04 DIAGNOSIS — M47.816 DEGENERATIVE JOINT DISEASE OF CERVICAL AND LUMBAR SPINE: Chronic | ICD-10-CM

## 2018-06-04 DIAGNOSIS — M25.561 CHRONIC PAIN OF BOTH KNEES: ICD-10-CM

## 2018-06-05 RX ORDER — FUROSEMIDE 20 MG/1
20 TABLET ORAL DAILY PRN
Qty: 60 TABLET | Refills: 1 | Status: SHIPPED | OUTPATIENT
Start: 2018-06-05 | End: 2019-01-07 | Stop reason: SDUPTHER

## 2018-06-05 RX ORDER — TRAMADOL HYDROCHLORIDE 50 MG/1
TABLET, COATED ORAL
Qty: 180 TABLET | Refills: 1 | Status: SHIPPED | OUTPATIENT
Start: 2018-06-05 | End: 2018-08-08 | Stop reason: SDUPTHER

## 2018-08-08 DIAGNOSIS — M25.561 CHRONIC PAIN OF BOTH KNEES: ICD-10-CM

## 2018-08-08 DIAGNOSIS — G89.29 CHRONIC PAIN OF BOTH KNEES: ICD-10-CM

## 2018-08-08 DIAGNOSIS — M25.562 CHRONIC PAIN OF BOTH KNEES: ICD-10-CM

## 2018-08-08 DIAGNOSIS — M47.812 DEGENERATIVE JOINT DISEASE OF CERVICAL AND LUMBAR SPINE: Chronic | ICD-10-CM

## 2018-08-08 DIAGNOSIS — M47.816 DEGENERATIVE JOINT DISEASE OF CERVICAL AND LUMBAR SPINE: Chronic | ICD-10-CM

## 2018-08-08 NOTE — TELEPHONE ENCOUNTER
Date of last visit: 2/2/2018  Date of next visit:  8/13/2018    Requested Prescriptions     Pending Prescriptions Disp Refills    ULTRAM 50 MG tablet 180 tablet 1     Sig: TAKE 2 TABLETS BY MOUTH 3 TIMES DAILY.

## 2018-08-10 RX ORDER — TRAMADOL HYDROCHLORIDE 50 MG/1
TABLET, COATED ORAL
Qty: 180 TABLET | Refills: 1 | Status: SHIPPED | OUTPATIENT
Start: 2018-08-10 | End: 2018-10-09 | Stop reason: SDUPTHER

## 2018-08-13 ENCOUNTER — OFFICE VISIT (OUTPATIENT)
Dept: FAMILY MEDICINE CLINIC | Age: 63
End: 2018-08-13

## 2018-08-13 VITALS
RESPIRATION RATE: 14 BRPM | SYSTOLIC BLOOD PRESSURE: 136 MMHG | HEART RATE: 72 BPM | HEIGHT: 66 IN | WEIGHT: 170 LBS | DIASTOLIC BLOOD PRESSURE: 88 MMHG | BODY MASS INDEX: 27.32 KG/M2

## 2018-08-13 DIAGNOSIS — K21.9 GASTROESOPHAGEAL REFLUX DISEASE WITHOUT ESOPHAGITIS: ICD-10-CM

## 2018-08-13 DIAGNOSIS — M47.816 DEGENERATIVE JOINT DISEASE OF CERVICAL AND LUMBAR SPINE: Chronic | ICD-10-CM

## 2018-08-13 DIAGNOSIS — E78.00 PURE HYPERCHOLESTEROLEMIA: ICD-10-CM

## 2018-08-13 DIAGNOSIS — I10 ESSENTIAL HYPERTENSION: Primary | ICD-10-CM

## 2018-08-13 DIAGNOSIS — R60.9 SWELLING: ICD-10-CM

## 2018-08-13 DIAGNOSIS — M47.812 DEGENERATIVE JOINT DISEASE OF CERVICAL AND LUMBAR SPINE: Chronic | ICD-10-CM

## 2018-08-13 PROCEDURE — 99213 OFFICE O/P EST LOW 20 MIN: CPT | Performed by: FAMILY MEDICINE

## 2018-08-13 RX ORDER — PREDNISONE 20 MG/1
TABLET ORAL
Qty: 24 TABLET | Refills: 0 | Status: SHIPPED | OUTPATIENT
Start: 2018-08-13 | End: 2018-10-15 | Stop reason: ALTCHOICE

## 2018-08-13 ASSESSMENT — ENCOUNTER SYMPTOMS
SORE THROAT: 0
CHEST TIGHTNESS: 0
CONSTIPATION: 0
BACK PAIN: 1
WHEEZING: 0
EYE PAIN: 0
COUGH: 0
SHORTNESS OF BREATH: 0
ORTHOPNEA: 0
ABDOMINAL PAIN: 0
NAUSEA: 0

## 2018-08-13 NOTE — PROGRESS NOTES
and frequency. Musculoskeletal: Positive for back pain. Negative for arthralgias, joint swelling, neck pain and neck stiffness. Skin: Negative for rash. Neurological: Negative for dizziness, weakness, numbness and headaches. Hematological: Negative for adenopathy. Does not bruise/bleed easily. Psychiatric/Behavioral: Negative for behavioral problems and sleep disturbance. The patient is not nervous/anxious. /88 (Site: Right Arm, Position: Sitting, Cuff Size: Medium Adult)   Pulse 72   Resp 14   Ht 5' 6\" (1.676 m)   Wt 170 lb (77.1 kg)   Breastfeeding? No   BMI 27.44 kg/m²   Objective:   Physical Exam   Constitutional: She is oriented to person, place, and time. She appears well-developed and well-nourished. HENT:   Head: Normocephalic and atraumatic. Right Ear: External ear normal.   Left Ear: External ear normal.   Nose: Nose normal.   Mouth/Throat: Oropharynx is clear and moist.   Eyes: Pupils are equal, round, and reactive to light. Conjunctivae and EOM are normal. No scleral icterus. Neck: Normal range of motion. Neck supple. No JVD present. No thyromegaly present. Cardiovascular: Normal rate, regular rhythm, normal heart sounds and intact distal pulses. Pulmonary/Chest: Effort normal and breath sounds normal. She has no wheezes. She has no rales. Abdominal: Soft. Bowel sounds are normal. She exhibits no distension and no mass. There is no tenderness. Musculoskeletal: Normal range of motion. She exhibits tenderness. Low  Back    pain   Lymphadenopathy:     She has no cervical adenopathy. Neurological: She is alert and oriented to person, place, and time. She has normal reflexes. No cranial nerve deficit. Skin: Skin is warm and dry. No rash noted. Psychiatric: She has a normal mood and affect. Nursing note and vitals reviewed. Assessment:        Diagnosis Orders   1. Essential hypertension     2.  Degenerative joint disease of cervical and lumbar spine predniSONE (DELTASONE) 20 MG tablet   3. Pure hypercholesterolemia     4. Gastroesophageal reflux disease without esophagitis     5. Swelling           Plan:      Current Outpatient Prescriptions   Medication Sig Dispense Refill    predniSONE (DELTASONE) 20 MG tablet one tablet 3 times a day for 3 days then one tablet twice a day for 5 days then one a day for 5 days 24 tablet 0    ULTRAM 50 MG tablet TAKE 2 TABLETS BY MOUTH 3 TIMES DAILY. 180 tablet 1    furosemide (LASIX) 20 MG tablet Take 1 tablet by mouth daily as needed (leg  edema) 60 tablet 1    oxaprozin (DAYPRO) 600 MG tablet Take 1 tablet by mouth 2 times daily 60 tablet 3    hydrochlorothiazide (HYDRODIURIL) 25 MG tablet Take 1 tablet by mouth every other day 45 tablet 3    esomeprazole (NEXIUM) 40 MG delayed release capsule TAKE 1 CAPSULE BY MOUTH 2 TIMES DAILY. 60 capsule 5    valsartan (DIOVAN) 40 MG tablet Take 1 tablet by mouth daily 60 tablet 5    potassium chloride (KLOR-CON M) 10 MEQ extended release tablet Take 1 tablet by mouth daily (Patient taking differently: Take 10 mEq by mouth daily Taking every 3-4 days) 30 tablet 5    gabapentin (NEURONTIN) 300 MG capsule Take 1 capsule by mouth 2 times daily. (Patient taking differently: Take 300 mg by mouth nightly .) 60 capsule 5    metaxalone (SKELAXIN) 800 MG tablet TAKE 1 TABLET BY MOUTH 3 TIMES DAILY 90 tablet 3    HYDROcodone-acetaminophen (NORCO) 5-325 MG per tablet TAKE 1 OR 2 TABLETS EVERY 6-8 HOURS AS NEEDED FOR PAIN  0    ipratropium (ATROVENT) 0.06 % nasal spray 2 sprays by Nasal route 2 times daily 1 Bottle 5    nitroGLYCERIN (NITROLINGUAL) 0.4 MG/SPRAY spray Place 1 spray under the tongue every 5 minutes as needed for Chest pain. 1 Bottle 1    Cholecalciferol (VITAMIN D3) 3000 UNITS TABS Take 5,000 Units by mouth daily.  folic acid (FOLVITE) 1 MG tablet Take 1 mg by mouth daily.  estrogens, conjugated, (PREMARIN) 0.9 MG tablet Take 0.9 mg by mouth daily.        

## 2018-08-20 DIAGNOSIS — K21.9 GASTROESOPHAGEAL REFLUX DISEASE WITHOUT ESOPHAGITIS: ICD-10-CM

## 2018-08-20 NOTE — TELEPHONE ENCOUNTER
Date of last visit:  8/13/2018  Date of next visit:  Visit date not found    Requested Prescriptions     Pending Prescriptions Disp Refills    esomeprazole (ZALORA) 40 MG delayed release capsule [Pharmacy Med Name: ESOMEPRAZOLE MAG DR 40 MG CAP] 60 capsule 5     Sig: TAKE 1 CAPSULE BY MOUTH 2 TIMES DAILY.

## 2018-08-21 RX ORDER — ESOMEPRAZOLE MAGNESIUM 40 MG/1
CAPSULE, DELAYED RELEASE ORAL
Qty: 60 CAPSULE | Refills: 5 | Status: SHIPPED | OUTPATIENT
Start: 2018-08-21 | End: 2019-03-08 | Stop reason: SDUPTHER

## 2018-09-12 RX ORDER — IPRATROPIUM BROMIDE 42 UG/1
SPRAY, METERED NASAL
Qty: 15 ML | Refills: 5 | Status: SHIPPED | OUTPATIENT
Start: 2018-09-12 | End: 2019-03-13 | Stop reason: SDUPTHER

## 2018-10-04 ENCOUNTER — HOSPITAL ENCOUNTER (OUTPATIENT)
Dept: GENERAL RADIOLOGY | Age: 63
Discharge: HOME | End: 2018-10-04
Payer: COMMERCIAL

## 2018-10-04 ENCOUNTER — HOSPITAL ENCOUNTER (OUTPATIENT)
Age: 63
Discharge: HOME | End: 2018-10-04
Payer: COMMERCIAL

## 2018-10-04 DIAGNOSIS — Z01.818 PRE-OP TESTING: ICD-10-CM

## 2018-10-04 LAB
ALBUMIN SERPL-MCNC: 3.9 G/DL (ref 3.5–5.1)
ANION GAP SERPL CALCULATED.3IONS-SCNC: 13 MEQ/L (ref 8–16)
BASOPHILS # BLD: 1 %
BASOPHILS ABSOLUTE: 0 THOU/MM3 (ref 0–0.1)
BUN BLDV-MCNC: 20 MG/DL (ref 7–22)
CALCIUM SERPL-MCNC: 8.9 MG/DL (ref 8.5–10.5)
CHLORIDE BLD-SCNC: 101 MEQ/L (ref 98–111)
CO2: 28 MEQ/L (ref 23–33)
CREAT SERPL-MCNC: 0.7 MG/DL (ref 0.4–1.2)
EKG ATRIAL RATE: 61 BPM
EKG P AXIS: 42 DEGREES
EKG P-R INTERVAL: 162 MS
EKG Q-T INTERVAL: 440 MS
EKG QRS DURATION: 98 MS
EKG QTC CALCULATION (BAZETT): 442 MS
EKG R AXIS: 51 DEGREES
EKG T AXIS: 36 DEGREES
EKG VENTRICULAR RATE: 61 BPM
EOSINOPHIL # BLD: 2.3 %
EOSINOPHILS ABSOLUTE: 0.1 THOU/MM3 (ref 0–0.4)
ERYTHROCYTE [DISTWIDTH] IN BLOOD BY AUTOMATED COUNT: 15 % (ref 11.5–14.5)
ERYTHROCYTE [DISTWIDTH] IN BLOOD BY AUTOMATED COUNT: 43.8 FL (ref 35–45)
GFR SERPL CREATININE-BSD FRML MDRD: 85 ML/MIN/1.73M2
GLUCOSE BLD-MCNC: 83 MG/DL (ref 70–108)
HCT VFR BLD CALC: 32.2 % (ref 37–47)
HEMOGLOBIN: 9.7 GM/DL (ref 12–16)
IMMATURE GRANS (ABS): 0.02 THOU/MM3 (ref 0–0.07)
IMMATURE GRANULOCYTES: 0.5 %
LYMPHOCYTES # BLD: 38.2 %
LYMPHOCYTES ABSOLUTE: 1.5 THOU/MM3 (ref 1–4.8)
MCH RBC QN AUTO: 24.2 PG (ref 26–33)
MCHC RBC AUTO-ENTMCNC: 30.1 GM/DL (ref 32.2–35.5)
MCV RBC AUTO: 80.3 FL (ref 81–99)
MONOCYTES # BLD: 12.6 %
MONOCYTES ABSOLUTE: 0.5 THOU/MM3 (ref 0.4–1.3)
MRSA NASAL SCREEN RT-PCR: NEGATIVE
NUCLEATED RED BLOOD CELLS: 0 /100 WBC
PLATELET # BLD: 323 THOU/MM3 (ref 130–400)
PMV BLD AUTO: 9.4 FL (ref 9.4–12.4)
POTASSIUM SERPL-SCNC: 3.2 MEQ/L (ref 3.5–5.2)
PREALBUMIN: 23.1 MG/DL (ref 20–40)
RBC # BLD: 4.01 MILL/MM3 (ref 4.2–5.4)
SEG NEUTROPHILS: 45.4 %
SEGMENTED NEUTROPHILS ABSOLUTE COUNT: 1.8 THOU/MM3 (ref 1.8–7.7)
SODIUM BLD-SCNC: 142 MEQ/L (ref 135–145)
STAPH AUREUS SCREEN RT-PCR: NEGATIVE
WBC # BLD: 3.9 THOU/MM3 (ref 4.8–10.8)

## 2018-10-04 PROCEDURE — 80048 BASIC METABOLIC PNL TOTAL CA: CPT

## 2018-10-04 PROCEDURE — 87641 MR-STAPH DNA AMP PROBE: CPT

## 2018-10-04 PROCEDURE — 71046 X-RAY EXAM CHEST 2 VIEWS: CPT

## 2018-10-04 PROCEDURE — 93005 ELECTROCARDIOGRAM TRACING: CPT | Performed by: ORTHOPAEDIC SURGERY

## 2018-10-04 PROCEDURE — 93010 ELECTROCARDIOGRAM REPORT: CPT | Performed by: INTERNAL MEDICINE

## 2018-10-04 PROCEDURE — 87640 STAPH A DNA AMP PROBE: CPT

## 2018-10-04 PROCEDURE — 84134 ASSAY OF PREALBUMIN: CPT

## 2018-10-04 PROCEDURE — 82040 ASSAY OF SERUM ALBUMIN: CPT

## 2018-10-04 PROCEDURE — 85025 COMPLETE CBC W/AUTO DIFF WBC: CPT

## 2018-10-04 PROCEDURE — 36415 COLL VENOUS BLD VENIPUNCTURE: CPT

## 2018-10-08 ENCOUNTER — TELEPHONE (OUTPATIENT)
Dept: FAMILY MEDICINE CLINIC | Age: 63
End: 2018-10-08

## 2018-10-08 ENCOUNTER — TELEPHONE (OUTPATIENT)
Dept: CARDIOLOGY CLINIC | Age: 63
End: 2018-10-08

## 2018-10-08 NOTE — TELEPHONE ENCOUNTER
[address] [place] [address] with Dr. Balwinder Breen office called for pre-op clearance. Removal of hardware L3-L5 extended to L1 with laminectomy and infusion. Pt is scheduled for Oct 26th at [address] Dr. Balwinder Breen to order pre-op testing.      [address] [place] [address] may be reached at [phone]

## 2018-10-09 DIAGNOSIS — M47.816 DEGENERATIVE JOINT DISEASE OF CERVICAL AND LUMBAR SPINE: Chronic | ICD-10-CM

## 2018-10-09 DIAGNOSIS — M47.812 DEGENERATIVE JOINT DISEASE OF CERVICAL AND LUMBAR SPINE: Chronic | ICD-10-CM

## 2018-10-09 DIAGNOSIS — M25.562 CHRONIC PAIN OF BOTH KNEES: ICD-10-CM

## 2018-10-09 DIAGNOSIS — G89.29 CHRONIC PAIN OF BOTH KNEES: ICD-10-CM

## 2018-10-09 DIAGNOSIS — M25.561 CHRONIC PAIN OF BOTH KNEES: ICD-10-CM

## 2018-10-09 NOTE — TELEPHONE ENCOUNTER
Patient returned call and was notified and voiced understanding. Clearance form in Dr. Peterson Ortiz box for signature.     Encounter sent to Dr. Lilo Carlson per patient request.

## 2018-10-09 NOTE — TELEPHONE ENCOUNTER
Date of last visit:  8/13/2018  Date of next visit:  10/15/2018    Requested Prescriptions     Pending Prescriptions Disp Refills    ULTRAM 50 MG tablet [Pharmacy Med Name: ULTRAM 50 MG TABLET] 180 tablet 1     Sig: TAKE 2 TABLETS BY MOUTH 3 TIMES A DAY

## 2018-10-10 RX ORDER — TRAMADOL HYDROCHLORIDE 50 MG/1
TABLET, COATED ORAL
Qty: 180 TABLET | Refills: 1 | Status: SHIPPED | OUTPATIENT
Start: 2018-10-10 | End: 2018-12-18 | Stop reason: SDUPTHER

## 2018-10-15 ENCOUNTER — OFFICE VISIT (OUTPATIENT)
Dept: FAMILY MEDICINE CLINIC | Age: 63
End: 2018-10-15

## 2018-10-15 VITALS
HEIGHT: 66 IN | WEIGHT: 169.38 LBS | RESPIRATION RATE: 14 BRPM | SYSTOLIC BLOOD PRESSURE: 128 MMHG | HEART RATE: 82 BPM | BODY MASS INDEX: 27.22 KG/M2 | DIASTOLIC BLOOD PRESSURE: 82 MMHG

## 2018-10-15 DIAGNOSIS — M47.816 DEGENERATIVE JOINT DISEASE OF CERVICAL AND LUMBAR SPINE: Chronic | ICD-10-CM

## 2018-10-15 DIAGNOSIS — K21.9 GASTROESOPHAGEAL REFLUX DISEASE WITHOUT ESOPHAGITIS: ICD-10-CM

## 2018-10-15 DIAGNOSIS — M17.11 PRIMARY OSTEOARTHRITIS OF RIGHT KNEE: ICD-10-CM

## 2018-10-15 DIAGNOSIS — E78.00 PURE HYPERCHOLESTEROLEMIA: ICD-10-CM

## 2018-10-15 DIAGNOSIS — I10 ESSENTIAL HYPERTENSION: Primary | ICD-10-CM

## 2018-10-15 DIAGNOSIS — M47.812 DEGENERATIVE JOINT DISEASE OF CERVICAL AND LUMBAR SPINE: Chronic | ICD-10-CM

## 2018-10-15 PROCEDURE — 99214 OFFICE O/P EST MOD 30 MIN: CPT | Performed by: FAMILY MEDICINE

## 2018-10-15 ASSESSMENT — ENCOUNTER SYMPTOMS
SORE THROAT: 0
ABDOMINAL PAIN: 0
CHEST TIGHTNESS: 0
NAUSEA: 0
EYE PAIN: 0
WHEEZING: 0
SHORTNESS OF BREATH: 0
CONSTIPATION: 0
COUGH: 0

## 2018-10-15 NOTE — PROGRESS NOTES
weakness, numbness and headaches. Hematological: Negative for adenopathy. Does not bruise/bleed easily. Psychiatric/Behavioral: Negative for behavioral problems and sleep disturbance. The patient is not nervous/anxious. /82 (Site: Right Upper Arm, Position: Sitting, Cuff Size: Medium Adult)   Pulse 82   Resp 14   Ht 5' 6\" (1.676 m)   Wt 169 lb 6 oz (76.8 kg)   Breastfeeding? No   BMI 27.34 kg/m²   Objective:   Physical Exam   Constitutional: She is oriented to person, place, and time. She appears well-developed and well-nourished. HENT:   Head: Normocephalic and atraumatic. Right Ear: External ear normal.   Left Ear: External ear normal.   Nose: Nose normal.   Mouth/Throat: Oropharynx is clear and moist.   Eyes: Pupils are equal, round, and reactive to light. Conjunctivae and EOM are normal. No scleral icterus. Neck: Normal range of motion. Neck supple. No JVD present. No thyromegaly present. Cardiovascular: Normal rate, regular rhythm, normal heart sounds and intact distal pulses. Pulmonary/Chest: Effort normal and breath sounds normal. She has no wheezes. She has no rales. Abdominal: Soft. Bowel sounds are normal. She exhibits no distension and no mass. There is no tenderness. Musculoskeletal: Normal range of motion. She exhibits no tenderness. Lymphadenopathy:     She has no cervical adenopathy. Neurological: She is alert and oriented to person, place, and time. She has normal reflexes. No cranial nerve deficit. Skin: Skin is warm and dry. No rash noted. Psychiatric: She has a normal mood and affect. Nursing note and vitals reviewed. Assessment:       Diagnosis Orders   1. Essential hypertension     2. Pure hypercholesterolemia     3. Degenerative joint disease of cervical and lumbar spine     4. Gastroesophageal reflux disease without esophagitis     5.  Primary osteoarthritis of right knee           Plan:      .  Current Outpatient Prescriptions   Medication Sig

## 2018-10-23 ENCOUNTER — NURSE ONLY (OUTPATIENT)
Dept: FAMILY MEDICINE CLINIC | Age: 63
End: 2018-10-23

## 2018-10-23 DIAGNOSIS — K21.9 GASTROESOPHAGEAL REFLUX DISEASE WITHOUT ESOPHAGITIS: ICD-10-CM

## 2018-10-23 DIAGNOSIS — D64.9 ANEMIA, UNSPECIFIED TYPE: Primary | ICD-10-CM

## 2018-10-23 LAB
CONTROL: ABNORMAL
HEMOCCULT STL QL: POSITIVE

## 2018-10-23 PROCEDURE — 82274 ASSAY TEST FOR BLOOD FECAL: CPT | Performed by: FAMILY MEDICINE

## 2018-10-23 NOTE — PROGRESS NOTES
Referral with records faxed to [facility] as well as Dr Maci Corral will be calling and speaking with Dr Daniel Lawton.

## 2018-10-25 ENCOUNTER — TELEPHONE (OUTPATIENT)
Dept: FAMILY MEDICINE CLINIC | Age: 63
End: 2018-10-25

## 2018-11-07 ENCOUNTER — HOSPITAL ENCOUNTER (OUTPATIENT)
Age: 63
Discharge: HOME | End: 2018-11-07
Payer: COMMERCIAL

## 2018-11-07 ENCOUNTER — TELEPHONE (OUTPATIENT)
Dept: FAMILY MEDICINE CLINIC | Age: 63
End: 2018-11-07

## 2018-11-07 PROCEDURE — 36415 COLL VENOUS BLD VENIPUNCTURE: CPT

## 2018-11-07 PROCEDURE — 84238 ASSAY NONENDOCRINE RECEPTOR: CPT

## 2018-11-09 LAB — SOLUBLE TRANSFERRIN RECEPT: 9.9 MG/L (ref 1.9–4.4)

## 2018-11-13 NOTE — TELEPHONE ENCOUNTER
Dr Bj Georges called and spoke with Dr Garret Martinez in regards to patients scopes. Letter was dictated and faxed to [facility] clearing patient for her surgery. Corinne informed by Phone that she should hear from [facility]. Patient called back this morning and stated [facility] called her and she is scheduled on Friday 1- to have her surgery completed with Dr Yao Samaniego. Patient was requesting any additional testing that would need to be done to be completed NOW verse waiting until Jan to get completed. Explained I would put the message back however if any additional testing was needed then patient would need to have it closer to her surgery date verse 2 months before.

## 2018-11-19 DIAGNOSIS — I10 ESSENTIAL HYPERTENSION: ICD-10-CM

## 2018-11-20 RX ORDER — FUROSEMIDE 20 MG/1
20 TABLET ORAL DAILY PRN
Qty: 60 TABLET | Refills: 1 | Status: SHIPPED | OUTPATIENT
Start: 2018-11-20 | End: 2019-06-18 | Stop reason: SDUPTHER

## 2018-12-03 ENCOUNTER — TELEPHONE (OUTPATIENT)
Dept: FAMILY MEDICINE CLINIC | Age: 63
End: 2018-12-03

## 2018-12-03 DIAGNOSIS — I10 ESSENTIAL HYPERTENSION: Primary | ICD-10-CM

## 2018-12-03 DIAGNOSIS — E78.00 PURE HYPERCHOLESTEROLEMIA: ICD-10-CM

## 2018-12-05 ENCOUNTER — HOSPITAL ENCOUNTER (OUTPATIENT)
Age: 63
Discharge: HOME | End: 2018-12-05
Payer: COMMERCIAL

## 2018-12-05 ENCOUNTER — TELEPHONE (OUTPATIENT)
Dept: FAMILY MEDICINE CLINIC | Age: 63
End: 2018-12-05

## 2018-12-05 DIAGNOSIS — M47.812 DEGENERATIVE JOINT DISEASE OF CERVICAL AND LUMBAR SPINE: Primary | Chronic | ICD-10-CM

## 2018-12-05 DIAGNOSIS — M47.816 DEGENERATIVE JOINT DISEASE OF CERVICAL AND LUMBAR SPINE: Primary | Chronic | ICD-10-CM

## 2018-12-05 PROCEDURE — 83516 IMMUNOASSAY NONANTIBODY: CPT

## 2018-12-05 PROCEDURE — 36415 COLL VENOUS BLD VENIPUNCTURE: CPT

## 2018-12-06 RX ORDER — PREDNISONE 20 MG/1
TABLET ORAL
Qty: 24 TABLET | Refills: 0 | Status: SHIPPED | OUTPATIENT
Start: 2018-12-06 | End: 2019-01-07 | Stop reason: ALTCHOICE

## 2018-12-08 LAB
TISSUE TRANSGLUTAMINASE ANTIBODY: 1 U/ML (ref 0–5)
TISSUE TRANSGLUTAMINASE IGA: 0 U/ML (ref 0–3)

## 2018-12-12 ENCOUNTER — TELEPHONE (OUTPATIENT)
Dept: FAMILY MEDICINE CLINIC | Age: 63
End: 2018-12-12

## 2018-12-18 DIAGNOSIS — M47.816 DEGENERATIVE JOINT DISEASE OF CERVICAL AND LUMBAR SPINE: Chronic | ICD-10-CM

## 2018-12-18 DIAGNOSIS — M47.812 DEGENERATIVE JOINT DISEASE OF CERVICAL AND LUMBAR SPINE: Chronic | ICD-10-CM

## 2018-12-18 DIAGNOSIS — G89.29 CHRONIC PAIN OF BOTH KNEES: ICD-10-CM

## 2018-12-18 DIAGNOSIS — M25.562 CHRONIC PAIN OF BOTH KNEES: ICD-10-CM

## 2018-12-18 DIAGNOSIS — M25.561 CHRONIC PAIN OF BOTH KNEES: ICD-10-CM

## 2018-12-21 RX ORDER — TRAMADOL HYDROCHLORIDE 50 MG/1
100 TABLET, COATED ORAL EVERY 8 HOURS PRN
Qty: 180 TABLET | Refills: 0 | Status: SHIPPED | OUTPATIENT
Start: 2018-12-21 | End: 2019-01-07 | Stop reason: SDUPTHER

## 2018-12-26 ENCOUNTER — HOSPITAL ENCOUNTER (OUTPATIENT)
Age: 63
Discharge: HOME | End: 2018-12-26
Payer: COMMERCIAL

## 2018-12-26 DIAGNOSIS — E78.00 PURE HYPERCHOLESTEROLEMIA: ICD-10-CM

## 2018-12-26 DIAGNOSIS — I10 ESSENTIAL HYPERTENSION: ICD-10-CM

## 2018-12-26 LAB
ALBUMIN SERPL-MCNC: 3.8 G/DL (ref 3.5–5.1)
ALP BLD-CCNC: 50 U/L (ref 38–126)
ALT SERPL-CCNC: 18 U/L (ref 11–66)
ANION GAP SERPL CALCULATED.3IONS-SCNC: 12 MEQ/L (ref 8–16)
ANISOCYTOSIS: PRESENT
AST SERPL-CCNC: 25 U/L (ref 5–40)
BASOPHILS # BLD: 0.5 %
BASOPHILS ABSOLUTE: 0 THOU/MM3 (ref 0–0.1)
BILIRUB SERPL-MCNC: 0.2 MG/DL (ref 0.3–1.2)
BUN BLDV-MCNC: 28 MG/DL (ref 7–22)
CALCIUM SERPL-MCNC: 8.6 MG/DL (ref 8.5–10.5)
CHLORIDE BLD-SCNC: 99 MEQ/L (ref 98–111)
CHOLESTEROL, TOTAL: 271 MG/DL (ref 100–199)
CO2: 28 MEQ/L (ref 23–33)
CREAT SERPL-MCNC: 0.8 MG/DL (ref 0.4–1.2)
EOSINOPHIL # BLD: 1.6 %
EOSINOPHILS ABSOLUTE: 0.1 THOU/MM3 (ref 0–0.4)
ERYTHROCYTE [DISTWIDTH] IN BLOOD BY AUTOMATED COUNT: 22.4 % (ref 11.5–14.5)
ERYTHROCYTE [DISTWIDTH] IN BLOOD BY AUTOMATED COUNT: 65.1 FL (ref 35–45)
GFR SERPL CREATININE-BSD FRML MDRD: 73 ML/MIN/1.73M2
GLUCOSE BLD-MCNC: 90 MG/DL (ref 70–108)
HCT VFR BLD CALC: 37.1 % (ref 37–47)
HDLC SERPL-MCNC: 110 MG/DL
HEMOGLOBIN: 11.3 GM/DL (ref 12–16)
IMMATURE GRANS (ABS): 0.03 THOU/MM3 (ref 0–0.07)
IMMATURE GRANULOCYTES: 0.4 %
LDL CHOLESTEROL CALCULATED: 136 MG/DL
LYMPHOCYTES # BLD: 23.5 %
LYMPHOCYTES ABSOLUTE: 1.8 THOU/MM3 (ref 1–4.8)
MCH RBC QN AUTO: 25.3 PG (ref 26–33)
MCHC RBC AUTO-ENTMCNC: 30.5 GM/DL (ref 32.2–35.5)
MCV RBC AUTO: 83.2 FL (ref 81–99)
MONOCYTES # BLD: 8.3 %
MONOCYTES ABSOLUTE: 0.6 THOU/MM3 (ref 0.4–1.3)
MRSA SCREEN RT-PCR: NEGATIVE
NUCLEATED RED BLOOD CELLS: 0 /100 WBC
PLATELET # BLD: 256 THOU/MM3 (ref 130–400)
PMV BLD AUTO: 9.4 FL (ref 9.4–12.4)
POTASSIUM SERPL-SCNC: 3.2 MEQ/L (ref 3.5–5.2)
PREALBUMIN: 27.4 MG/DL (ref 20–40)
RBC # BLD: 4.46 MILL/MM3 (ref 4.2–5.4)
SCAN OF BLOOD SMEAR: NORMAL
SEG NEUTROPHILS: 65.7 %
SEGMENTED NEUTROPHILS ABSOLUTE COUNT: 4.9 THOU/MM3 (ref 1.8–7.7)
SODIUM BLD-SCNC: 139 MEQ/L (ref 135–145)
TOTAL PROTEIN: 6 G/DL (ref 6.1–8)
TRIGL SERPL-MCNC: 123 MG/DL (ref 0–199)
TSH SERPL DL<=0.05 MIU/L-ACNC: 2.75 UIU/ML (ref 0.4–4.2)
WBC # BLD: 7.5 THOU/MM3 (ref 4.8–10.8)

## 2018-12-26 PROCEDURE — 80053 COMPREHEN METABOLIC PANEL: CPT

## 2018-12-26 PROCEDURE — 84443 ASSAY THYROID STIM HORMONE: CPT

## 2018-12-26 PROCEDURE — 36415 COLL VENOUS BLD VENIPUNCTURE: CPT

## 2018-12-26 PROCEDURE — 85025 COMPLETE CBC W/AUTO DIFF WBC: CPT

## 2018-12-26 PROCEDURE — 84134 ASSAY OF PREALBUMIN: CPT

## 2018-12-26 PROCEDURE — 80061 LIPID PANEL: CPT

## 2018-12-26 PROCEDURE — 87641 MR-STAPH DNA AMP PROBE: CPT

## 2018-12-28 ENCOUNTER — TELEPHONE (OUTPATIENT)
Dept: FAMILY MEDICINE CLINIC | Age: 63
End: 2018-12-28

## 2019-01-07 ENCOUNTER — OFFICE VISIT (OUTPATIENT)
Dept: FAMILY MEDICINE CLINIC | Age: 64
End: 2019-01-07

## 2019-01-07 VITALS
DIASTOLIC BLOOD PRESSURE: 80 MMHG | BODY MASS INDEX: 27.34 KG/M2 | SYSTOLIC BLOOD PRESSURE: 136 MMHG | HEART RATE: 76 BPM | RESPIRATION RATE: 14 BRPM | HEIGHT: 66 IN

## 2019-01-07 DIAGNOSIS — M25.562 CHRONIC PAIN OF BOTH KNEES: ICD-10-CM

## 2019-01-07 DIAGNOSIS — M47.812 DEGENERATIVE JOINT DISEASE OF CERVICAL AND LUMBAR SPINE: Chronic | ICD-10-CM

## 2019-01-07 DIAGNOSIS — G89.29 CHRONIC PAIN OF BOTH KNEES: ICD-10-CM

## 2019-01-07 DIAGNOSIS — M54.50 LUMBAR PAIN WITH RADIATION DOWN LEFT LEG: Primary | ICD-10-CM

## 2019-01-07 DIAGNOSIS — M79.605 LUMBAR PAIN WITH RADIATION DOWN LEFT LEG: Primary | ICD-10-CM

## 2019-01-07 DIAGNOSIS — M47.816 DEGENERATIVE JOINT DISEASE OF CERVICAL AND LUMBAR SPINE: Chronic | ICD-10-CM

## 2019-01-07 DIAGNOSIS — D62 ACUTE BLOOD LOSS ANEMIA: ICD-10-CM

## 2019-01-07 DIAGNOSIS — M25.561 CHRONIC PAIN OF BOTH KNEES: ICD-10-CM

## 2019-01-07 DIAGNOSIS — I10 ESSENTIAL HYPERTENSION: ICD-10-CM

## 2019-01-07 DIAGNOSIS — K21.9 GASTROESOPHAGEAL REFLUX DISEASE WITHOUT ESOPHAGITIS: ICD-10-CM

## 2019-01-07 DIAGNOSIS — E78.00 PURE HYPERCHOLESTEROLEMIA: ICD-10-CM

## 2019-01-07 PROBLEM — M23.329 DERANGEMENT OF MEDIAL MENISCUS, POSTERIOR HORN: Status: RESOLVED | Noted: 2017-04-17 | Resolved: 2019-01-07

## 2019-01-07 PROBLEM — M00.061 STAPHYLOCOCCAL ARTHRITIS OF RIGHT KNEE (HCC): Status: RESOLVED | Noted: 2017-04-17 | Resolved: 2019-01-07

## 2019-01-07 PROBLEM — M17.9 ARTHRITIS OF KNEE, DEGENERATIVE: Status: RESOLVED | Noted: 2017-04-17 | Resolved: 2019-01-07

## 2019-01-07 PROBLEM — M23.359: Status: RESOLVED | Noted: 2017-04-17 | Resolved: 2019-01-07

## 2019-01-07 LAB — HGB, POC: 13.7

## 2019-01-07 PROCEDURE — 99214 OFFICE O/P EST MOD 30 MIN: CPT | Performed by: FAMILY MEDICINE

## 2019-01-07 PROCEDURE — 85018 HEMOGLOBIN: CPT | Performed by: FAMILY MEDICINE

## 2019-01-07 RX ORDER — FERROUS SULFATE 325(65) MG
325 TABLET ORAL
COMMUNITY

## 2019-01-07 RX ORDER — TRAMADOL HYDROCHLORIDE 50 MG/1
100 TABLET, COATED ORAL EVERY 8 HOURS PRN
Qty: 180 TABLET | Refills: 0 | Status: SHIPPED | OUTPATIENT
Start: 2019-01-07 | End: 2019-03-13 | Stop reason: SDUPTHER

## 2019-01-07 RX ORDER — HYDROCHLOROTHIAZIDE 25 MG/1
25 TABLET ORAL EVERY OTHER DAY
Qty: 45 TABLET | Refills: 3 | Status: SHIPPED | OUTPATIENT
Start: 2019-01-07 | End: 2019-11-22 | Stop reason: SDUPTHER

## 2019-01-07 ASSESSMENT — ENCOUNTER SYMPTOMS
SORE THROAT: 0
BACK PAIN: 1
EYE PAIN: 0
SHORTNESS OF BREATH: 0
ABDOMINAL PAIN: 0
COUGH: 0
CHEST TIGHTNESS: 0
NAUSEA: 0
WHEEZING: 0
ORTHOPNEA: 0
CONSTIPATION: 0

## 2019-02-07 ENCOUNTER — OFFICE VISIT (OUTPATIENT)
Dept: CARDIOLOGY CLINIC | Age: 64
End: 2019-02-07
Payer: COMMERCIAL

## 2019-02-07 VITALS
DIASTOLIC BLOOD PRESSURE: 82 MMHG | SYSTOLIC BLOOD PRESSURE: 148 MMHG | HEART RATE: 88 BPM | HEIGHT: 66 IN | BODY MASS INDEX: 28.45 KG/M2 | WEIGHT: 177 LBS

## 2019-02-07 DIAGNOSIS — I10 ESSENTIAL HYPERTENSION: Primary | ICD-10-CM

## 2019-02-07 DIAGNOSIS — E78.01 FAMILIAL HYPERCHOLESTEROLEMIA: ICD-10-CM

## 2019-02-07 PROCEDURE — 99213 OFFICE O/P EST LOW 20 MIN: CPT | Performed by: NUCLEAR MEDICINE

## 2019-02-07 RX ORDER — OXYCODONE HYDROCHLORIDE AND ACETAMINOPHEN 5; 325 MG/1; MG/1
1 TABLET ORAL EVERY 4 HOURS PRN
COMMUNITY
End: 2019-06-18 | Stop reason: ALTCHOICE

## 2019-03-08 ENCOUNTER — TELEPHONE (OUTPATIENT)
Dept: FAMILY MEDICINE CLINIC | Age: 64
End: 2019-03-08

## 2019-03-08 DIAGNOSIS — K21.9 GASTROESOPHAGEAL REFLUX DISEASE WITHOUT ESOPHAGITIS: ICD-10-CM

## 2019-03-08 RX ORDER — ESOMEPRAZOLE MAGNESIUM 40 MG/1
CAPSULE, DELAYED RELEASE ORAL
Qty: 60 CAPSULE | Refills: 5 | Status: SHIPPED | OUTPATIENT
Start: 2019-03-08 | End: 2019-09-27 | Stop reason: SDUPTHER

## 2019-03-13 ENCOUNTER — OFFICE VISIT (OUTPATIENT)
Dept: FAMILY MEDICINE CLINIC | Age: 64
End: 2019-03-13

## 2019-03-13 VITALS
BODY MASS INDEX: 27 KG/M2 | HEIGHT: 66 IN | WEIGHT: 168 LBS | DIASTOLIC BLOOD PRESSURE: 82 MMHG | HEART RATE: 76 BPM | SYSTOLIC BLOOD PRESSURE: 150 MMHG | RESPIRATION RATE: 14 BRPM

## 2019-03-13 DIAGNOSIS — M47.812 DEGENERATIVE JOINT DISEASE OF CERVICAL AND LUMBAR SPINE: Primary | Chronic | ICD-10-CM

## 2019-03-13 DIAGNOSIS — I10 ESSENTIAL HYPERTENSION: ICD-10-CM

## 2019-03-13 DIAGNOSIS — K21.9 GASTROESOPHAGEAL REFLUX DISEASE WITHOUT ESOPHAGITIS: ICD-10-CM

## 2019-03-13 DIAGNOSIS — J30.1 NON-SEASONAL ALLERGIC RHINITIS DUE TO POLLEN: ICD-10-CM

## 2019-03-13 DIAGNOSIS — M47.816 DEGENERATIVE JOINT DISEASE OF CERVICAL AND LUMBAR SPINE: Primary | Chronic | ICD-10-CM

## 2019-03-13 DIAGNOSIS — D62 ACUTE BLOOD LOSS ANEMIA: ICD-10-CM

## 2019-03-13 DIAGNOSIS — M25.561 CHRONIC PAIN OF BOTH KNEES: ICD-10-CM

## 2019-03-13 DIAGNOSIS — M25.562 CHRONIC PAIN OF BOTH KNEES: ICD-10-CM

## 2019-03-13 DIAGNOSIS — G89.29 CHRONIC PAIN OF BOTH KNEES: ICD-10-CM

## 2019-03-13 LAB — HGB, POC: 9.3

## 2019-03-13 PROCEDURE — 99213 OFFICE O/P EST LOW 20 MIN: CPT | Performed by: FAMILY MEDICINE

## 2019-03-13 PROCEDURE — 85018 HEMOGLOBIN: CPT | Performed by: FAMILY MEDICINE

## 2019-03-13 RX ORDER — IPRATROPIUM BROMIDE 42 UG/1
SPRAY, METERED NASAL
Qty: 15 ML | Refills: 5 | Status: SHIPPED | OUTPATIENT
Start: 2019-03-13 | End: 2019-11-07 | Stop reason: SDUPTHER

## 2019-03-13 RX ORDER — GABAPENTIN 300 MG/1
300 CAPSULE ORAL 2 TIMES DAILY
Qty: 60 CAPSULE | Refills: 5 | Status: SHIPPED | OUTPATIENT
Start: 2019-03-13 | End: 2019-11-22 | Stop reason: SDUPTHER

## 2019-03-13 RX ORDER — PREDNISONE 20 MG/1
TABLET ORAL
Qty: 24 TABLET | Refills: 0 | Status: SHIPPED | OUTPATIENT
Start: 2019-03-13 | End: 2019-06-18 | Stop reason: ALTCHOICE

## 2019-03-13 RX ORDER — TRAMADOL HYDROCHLORIDE 50 MG/1
100 TABLET, COATED ORAL EVERY 8 HOURS PRN
Qty: 180 TABLET | Refills: 0 | Status: SHIPPED | OUTPATIENT
Start: 2019-03-21 | End: 2019-04-20

## 2019-03-13 ASSESSMENT — ENCOUNTER SYMPTOMS
COUGH: 0
SORE THROAT: 0
ORTHOPNEA: 0
NAUSEA: 0
CONSTIPATION: 0
SHORTNESS OF BREATH: 0
WHEEZING: 0
CHEST TIGHTNESS: 0
ABDOMINAL PAIN: 0
EYE PAIN: 0

## 2019-03-13 ASSESSMENT — PATIENT HEALTH QUESTIONNAIRE - PHQ9
SUM OF ALL RESPONSES TO PHQ QUESTIONS 1-9: 0
1. LITTLE INTEREST OR PLEASURE IN DOING THINGS: 0
SUM OF ALL RESPONSES TO PHQ QUESTIONS 1-9: 0
2. FEELING DOWN, DEPRESSED OR HOPELESS: 0
SUM OF ALL RESPONSES TO PHQ9 QUESTIONS 1 & 2: 0

## 2019-03-14 ENCOUNTER — TELEPHONE (OUTPATIENT)
Dept: FAMILY MEDICINE CLINIC | Age: 64
End: 2019-03-14

## 2019-03-24 DIAGNOSIS — M47.816 DEGENERATIVE JOINT DISEASE OF CERVICAL AND LUMBAR SPINE: Chronic | ICD-10-CM

## 2019-03-24 DIAGNOSIS — I10 ESSENTIAL HYPERTENSION: ICD-10-CM

## 2019-03-24 DIAGNOSIS — M25.562 CHRONIC PAIN OF BOTH KNEES: ICD-10-CM

## 2019-03-24 DIAGNOSIS — M47.812 DEGENERATIVE JOINT DISEASE OF CERVICAL AND LUMBAR SPINE: Chronic | ICD-10-CM

## 2019-03-24 DIAGNOSIS — G89.29 CHRONIC PAIN OF BOTH KNEES: ICD-10-CM

## 2019-03-24 DIAGNOSIS — M25.561 CHRONIC PAIN OF BOTH KNEES: ICD-10-CM

## 2019-03-27 RX ORDER — TRAMADOL HYDROCHLORIDE 50 MG/1
TABLET ORAL
Qty: 180 TABLET | Refills: 0 | Status: SHIPPED | OUTPATIENT
Start: 2019-03-27 | End: 2020-01-10 | Stop reason: SDUPTHER

## 2019-03-27 RX ORDER — POTASSIUM CHLORIDE 750 MG/1
10 TABLET, EXTENDED RELEASE ORAL DAILY
Qty: 30 TABLET | Refills: 3 | Status: SHIPPED | OUTPATIENT
Start: 2019-03-27 | End: 2019-05-28 | Stop reason: SDUPTHER

## 2019-04-11 ENCOUNTER — TELEPHONE (OUTPATIENT)
Dept: FAMILY MEDICINE CLINIC | Age: 64
End: 2019-04-11

## 2019-04-11 DIAGNOSIS — R05.9 COUGH: Primary | ICD-10-CM

## 2019-04-11 NOTE — TELEPHONE ENCOUNTER
Date of last visit:  3/13/2019  Date of next visit:  6/18/2019    Requested Prescriptions     Pending Prescriptions Disp Refills    promethazine-codeine (PHENERGAN WITH CODEINE) 6.25-10 MG/5ML syrup 180 mL 0     Sig: Take 5 mLs by mouth 4 times daily as needed for Cough for up to 7 days. Patient called and stated she has a Cough, tried OTC meds and it is not working. [facility] [address]

## 2019-04-12 RX ORDER — PROMETHAZINE HYDROCHLORIDE AND CODEINE PHOSPHATE 6.25; 1 MG/5ML; MG/5ML
5 SYRUP ORAL 4 TIMES DAILY PRN
Qty: 180 ML | Refills: 0 | Status: SHIPPED | OUTPATIENT
Start: 2019-04-12 | End: 2019-04-19

## 2019-04-23 DIAGNOSIS — G89.29 CHRONIC PAIN OF BOTH KNEES: ICD-10-CM

## 2019-04-23 DIAGNOSIS — M25.561 CHRONIC PAIN OF BOTH KNEES: ICD-10-CM

## 2019-04-23 DIAGNOSIS — M47.812 DEGENERATIVE JOINT DISEASE OF CERVICAL AND LUMBAR SPINE: Chronic | ICD-10-CM

## 2019-04-23 DIAGNOSIS — M47.816 DEGENERATIVE JOINT DISEASE OF CERVICAL AND LUMBAR SPINE: Chronic | ICD-10-CM

## 2019-04-23 DIAGNOSIS — M25.562 CHRONIC PAIN OF BOTH KNEES: ICD-10-CM

## 2019-04-23 RX ORDER — TRAMADOL HYDROCHLORIDE 50 MG/1
100 TABLET ORAL EVERY 8 HOURS PRN
Qty: 180 TABLET | Refills: 0 | Status: CANCELLED | OUTPATIENT
Start: 2019-04-23 | End: 2019-05-23

## 2019-04-23 NOTE — TELEPHONE ENCOUNTER
Date of last visit:  3/13/2019  Date of next visit:  2019    Requested Prescriptions     Pending Prescriptions Disp Refills    traMADol (ULTRAM) 50 MG tablet 180 tablet 0     Si tablets every 8 hours as needed for Pain for up to 30 days.

## 2019-04-24 RX ORDER — TRAMADOL HYDROCHLORIDE 50 MG/1
100 TABLET, COATED ORAL EVERY 8 HOURS
Qty: 180 TABLET | Refills: 0 | Status: SHIPPED | OUTPATIENT
Start: 2019-04-25 | End: 2019-05-25 | Stop reason: SDUPTHER

## 2019-04-29 ENCOUNTER — NURSE ONLY (OUTPATIENT)
Dept: FAMILY MEDICINE CLINIC | Age: 64
End: 2019-04-29

## 2019-04-29 DIAGNOSIS — D62 ACUTE BLOOD LOSS ANEMIA: Primary | ICD-10-CM

## 2019-04-29 LAB — HGB, POC: 11.1

## 2019-04-29 PROCEDURE — 85018 HEMOGLOBIN: CPT | Performed by: FAMILY MEDICINE

## 2019-04-30 ENCOUNTER — TELEPHONE (OUTPATIENT)
Dept: FAMILY MEDICINE CLINIC | Age: 64
End: 2019-04-30

## 2019-04-30 NOTE — TELEPHONE ENCOUNTER
----- Message from Darrel Nguyen MD sent at 4/30/2019 12:54 AM EDT -----  Up to 11.1 and before surgery was 13 so stay on meds another 6 weeks

## 2019-05-24 DIAGNOSIS — G89.29 CHRONIC PAIN OF BOTH KNEES: ICD-10-CM

## 2019-05-24 DIAGNOSIS — M25.562 CHRONIC PAIN OF BOTH KNEES: ICD-10-CM

## 2019-05-24 DIAGNOSIS — M47.812 DEGENERATIVE JOINT DISEASE OF CERVICAL AND LUMBAR SPINE: Chronic | ICD-10-CM

## 2019-05-24 DIAGNOSIS — M25.561 CHRONIC PAIN OF BOTH KNEES: ICD-10-CM

## 2019-05-24 DIAGNOSIS — M47.816 DEGENERATIVE JOINT DISEASE OF CERVICAL AND LUMBAR SPINE: Chronic | ICD-10-CM

## 2019-05-25 RX ORDER — TRAMADOL HYDROCHLORIDE 50 MG/1
100 TABLET, COATED ORAL EVERY 8 HOURS
Qty: 180 TABLET | Refills: 0 | Status: SHIPPED | OUTPATIENT
Start: 2019-05-25 | End: 2019-06-18 | Stop reason: SDUPTHER

## 2019-05-28 DIAGNOSIS — I10 ESSENTIAL HYPERTENSION: ICD-10-CM

## 2019-05-28 NOTE — TELEPHONE ENCOUNTER
Date of last visit:  3/13/2019  Date of next visit:  6/18/2019    Requested Prescriptions     Pending Prescriptions Disp Refills    potassium chloride (KLOR-CON M10) 10 MEQ extended release tablet 30 tablet 5     Sig: Take 1 tablet by mouth daily

## 2019-05-29 RX ORDER — POTASSIUM CHLORIDE 750 MG/1
10 TABLET, EXTENDED RELEASE ORAL DAILY
Qty: 30 TABLET | Refills: 5 | Status: SHIPPED | OUTPATIENT
Start: 2019-05-29 | End: 2019-11-22 | Stop reason: SDUPTHER

## 2019-06-18 ENCOUNTER — OFFICE VISIT (OUTPATIENT)
Dept: FAMILY MEDICINE CLINIC | Age: 64
End: 2019-06-18

## 2019-06-18 VITALS
SYSTOLIC BLOOD PRESSURE: 134 MMHG | HEIGHT: 66 IN | DIASTOLIC BLOOD PRESSURE: 80 MMHG | RESPIRATION RATE: 12 BRPM | HEART RATE: 76 BPM | WEIGHT: 170 LBS | BODY MASS INDEX: 27.32 KG/M2

## 2019-06-18 DIAGNOSIS — M25.561 CHRONIC PAIN OF BOTH KNEES: ICD-10-CM

## 2019-06-18 DIAGNOSIS — K21.9 GASTROESOPHAGEAL REFLUX DISEASE WITHOUT ESOPHAGITIS: ICD-10-CM

## 2019-06-18 DIAGNOSIS — M47.816 DEGENERATIVE JOINT DISEASE OF CERVICAL AND LUMBAR SPINE: Chronic | ICD-10-CM

## 2019-06-18 DIAGNOSIS — G89.29 CHRONIC PAIN OF BOTH KNEES: ICD-10-CM

## 2019-06-18 DIAGNOSIS — M25.562 CHRONIC PAIN OF BOTH KNEES: ICD-10-CM

## 2019-06-18 DIAGNOSIS — D62 ACUTE BLOOD LOSS ANEMIA: ICD-10-CM

## 2019-06-18 DIAGNOSIS — I10 ESSENTIAL HYPERTENSION: Primary | ICD-10-CM

## 2019-06-18 DIAGNOSIS — M47.812 DEGENERATIVE JOINT DISEASE OF CERVICAL AND LUMBAR SPINE: Chronic | ICD-10-CM

## 2019-06-18 LAB — HGB, POC: 11.9

## 2019-06-18 PROCEDURE — 99213 OFFICE O/P EST LOW 20 MIN: CPT | Performed by: FAMILY MEDICINE

## 2019-06-18 PROCEDURE — 85018 HEMOGLOBIN: CPT | Performed by: FAMILY MEDICINE

## 2019-06-18 RX ORDER — TRAMADOL HYDROCHLORIDE 50 MG/1
100 TABLET, COATED ORAL EVERY 8 HOURS
Qty: 180 TABLET | Refills: 0 | Status: SHIPPED | OUTPATIENT
Start: 2019-06-23 | End: 2019-07-26 | Stop reason: SDUPTHER

## 2019-06-18 RX ORDER — FUROSEMIDE 20 MG/1
20 TABLET ORAL DAILY PRN
Qty: 60 TABLET | Refills: 1 | Status: SHIPPED | OUTPATIENT
Start: 2019-06-18 | End: 2019-09-27 | Stop reason: SDUPTHER

## 2019-06-18 ASSESSMENT — ENCOUNTER SYMPTOMS
WHEEZING: 0
ORTHOPNEA: 0
COUGH: 0
NAUSEA: 0
CHEST TIGHTNESS: 0
SORE THROAT: 0
ABDOMINAL PAIN: 0
EYE PAIN: 0
SHORTNESS OF BREATH: 0
CONSTIPATION: 0

## 2019-06-18 NOTE — PROGRESS NOTES
Subjective:      Patient ID: Bryan Hammer is a 59 y.o. female. Back   Stable     Fusion  Of  Lower  Back and  Better      lipids  Stable     Tramadol  Of  The  Back and knees       Colonoscopy  Needs   Done        Hyperlipidemia   This is a chronic problem. The current episode started more than 1 year ago. The problem is controlled. Recent lipid tests were reviewed and are normal. Pertinent negatives include no chest pain or shortness of breath. Current antihyperlipidemic treatment includes statins. The current treatment provides significant improvement of lipids. There are no compliance problems. Hypertension   This is a chronic problem. The current episode started more than 1 year ago. The problem has been resolved since onset. The problem is controlled. Pertinent negatives include no chest pain, headaches, neck pain, orthopnea, palpitations, peripheral edema or shortness of breath. The current treatment provides significant improvement. There are no compliance problems. Past Medical History:   Diagnosis Date    Acid reflux     Arthritis     Bleeding tendency (HCC)     Cancer ([facility] [address] 1989    cervical    Gallbladder disease 2010    History of scarlet fever     Hyperlipidemia     Hypertension     Joint pain     Medication intolerance     VERY SENSITIVE TO MEDICATIONS    Swelling      Review of Systems   Constitutional: Negative for appetite change, fatigue and fever. HENT: Negative for congestion, ear pain, postnasal drip and sore throat. Eyes: Negative for pain and visual disturbance. Respiratory: Negative for cough, chest tightness, shortness of breath and wheezing. Cardiovascular: Negative for chest pain, palpitations, orthopnea and leg swelling. Gastrointestinal: Negative for abdominal pain, constipation and nausea. Genitourinary: Negative for dysuria and frequency. Musculoskeletal: Negative for arthralgias, joint swelling, neck pain and neck stiffness.    Skin: Negative disease without esophagitis           Plan:      Current Outpatient Medications   Medication Sig Dispense Refill    oxaprozin (DAYPRO) 600 MG tablet TAKE 1 TABLET BY MOUTH TWICE A DAY 60 tablet 3    potassium chloride (KLOR-CON M10) 10 MEQ extended release tablet Take 1 tablet by mouth daily 30 tablet 5    ULTRAM 50 MG tablet Take 2 tablets by mouth every 8 hours for 30 days. 180 tablet 0    ipratropium (ATROVENT) 0.06 % nasal spray INSTILL 2 SPRAYS BY NASAL ROUTE 2 TIMES DAILY 15 mL 5    gabapentin (NEURONTIN) 300 MG capsule Take 1 capsule by mouth 2 times daily for 180 days. (Patient taking differently: Take 300 mg by mouth 2 times daily. Take one at bedtime and as needed) 60 capsule 5    esomeprazole (NEXIUM) 40 MG delayed release capsule TAKE 1 CAPSULE BY MOUTH 2 TIMES DAILY. 60 capsule 5    ferrous sulfate 325 (65 Fe) MG tablet Take 325 mg by mouth every other day       hydrochlorothiazide (HYDRODIURIL) 25 MG tablet Take 1 tablet by mouth every other day 45 tablet 3    furosemide (LASIX) 20 MG tablet TAKE 1 TABLET BY MOUTH DAILY AS NEEDED (LEG EDEMA) (Patient taking differently: Take 20 mg by mouth every other day ) 60 tablet 1    metaxalone (SKELAXIN) 800 MG tablet TAKE 1 TABLET BY MOUTH 3 TIMES DAILY (Patient taking differently: 3 times daily as needed TAKE 1 TABLET BY MOUTH 3 TIMES DAILY) 90 tablet 3    nitroGLYCERIN (NITROLINGUAL) 0.4 MG/SPRAY spray Place 1 spray under the tongue every 5 minutes as needed for Chest pain. 1 Bottle 1    Cholecalciferol (VITAMIN D3) 3000 UNITS TABS Take 5,000 Units by mouth daily.  folic acid (FOLVITE) 1 MG tablet Take 1 mg by mouth daily.  estrogens, conjugated, (PREMARIN) 0.9 MG tablet Take 0.9 mg by mouth daily.  Ascorbic Acid (VITAMIN C) 500 MG tablet Take 1,000 mg by mouth daily. No current facility-administered medications for this visit.           Orders Placed This Encounter   Procedures    POCT hemoglobin     Results for orders placed or performed in visit on 06/18/19   POCT hemoglobin   Result Value Ref Range    Hemoglobin 11.9         see in  3 mths and  And  On  Tramadol and  stabl e and  functioinal  Noted   With  No  Issues   Arnaldo Wolf MD

## 2019-07-26 DIAGNOSIS — M25.561 CHRONIC PAIN OF BOTH KNEES: ICD-10-CM

## 2019-07-26 DIAGNOSIS — M47.812 DEGENERATIVE JOINT DISEASE OF CERVICAL AND LUMBAR SPINE: Chronic | ICD-10-CM

## 2019-07-26 DIAGNOSIS — G89.29 CHRONIC PAIN OF BOTH KNEES: ICD-10-CM

## 2019-07-26 DIAGNOSIS — M47.816 DEGENERATIVE JOINT DISEASE OF CERVICAL AND LUMBAR SPINE: Chronic | ICD-10-CM

## 2019-07-26 DIAGNOSIS — M25.562 CHRONIC PAIN OF BOTH KNEES: ICD-10-CM

## 2019-07-26 NOTE — TELEPHONE ENCOUNTER
The pharmacy is  requesting a refill of the below medication which has been pended for you:     Requested Prescriptions     Pending Prescriptions Disp Refills    ULTRAM 50 MG tablet [Pharmacy Med Name: ULTRAM 50 MG TABLET] 180 tablet 0     Sig: Take 2 tablets by mouth every 8 hours for 30 days.        Last Appointment Date: 6/18/2019  Next Appointment Date: Visit date not found    Allergies   Allergen Reactions    Aldactone [Spironolactone] Other (See Comments)     CAUSES HEADACHES    Amlodipine Besy-Benazepril Hcl Other (See Comments)     CAUSES JOINT PAIN    Amlodipine-Atorvastatin Other (See Comments)     CAUSES INCREASED CRP     Bactrim     Cardizem [Diltiazem Hcl] Other (See Comments)     CAUSES LETHARGY AND LIGHT HEADED    Feldene [Piroxicam]     Motrin [Ibuprofen Micronized]     Naproxen     Potassium-Containing Compounds Other (See Comments)     CAUSES ACID REFLUX    Statins [Statins]      muscle    Vioxx     Zetia [Ezetimibe] Nausea Only and Other (See Comments)     CAUSES REFLUX    Macrobid [Nitrofurantoin] Nausea And Vomiting    Plaquenil [Hydroxychloroquine Sulfate] Rash

## 2019-07-31 RX ORDER — TRAMADOL HYDROCHLORIDE 50 MG/1
100 TABLET, COATED ORAL EVERY 8 HOURS
Qty: 180 TABLET | Refills: 0 | Status: SHIPPED | OUTPATIENT
Start: 2019-07-31 | End: 2019-09-05 | Stop reason: SDUPTHER

## 2019-09-04 DIAGNOSIS — M25.562 CHRONIC PAIN OF BOTH KNEES: ICD-10-CM

## 2019-09-04 DIAGNOSIS — M25.561 CHRONIC PAIN OF BOTH KNEES: ICD-10-CM

## 2019-09-04 DIAGNOSIS — M47.816 DEGENERATIVE JOINT DISEASE OF CERVICAL AND LUMBAR SPINE: Chronic | ICD-10-CM

## 2019-09-04 DIAGNOSIS — G89.29 CHRONIC PAIN OF BOTH KNEES: ICD-10-CM

## 2019-09-04 DIAGNOSIS — M47.812 DEGENERATIVE JOINT DISEASE OF CERVICAL AND LUMBAR SPINE: Chronic | ICD-10-CM

## 2019-09-05 RX ORDER — TRAMADOL HYDROCHLORIDE 50 MG/1
100 TABLET, COATED ORAL EVERY 8 HOURS
Qty: 180 TABLET | Refills: 0 | Status: SHIPPED | OUTPATIENT
Start: 2019-09-05 | End: 2019-09-27 | Stop reason: SDUPTHER

## 2019-09-27 ENCOUNTER — OFFICE VISIT (OUTPATIENT)
Dept: FAMILY MEDICINE CLINIC | Age: 64
End: 2019-09-27

## 2019-09-27 VITALS
BODY MASS INDEX: 27.38 KG/M2 | HEART RATE: 72 BPM | RESPIRATION RATE: 14 BRPM | WEIGHT: 170.38 LBS | SYSTOLIC BLOOD PRESSURE: 136 MMHG | HEIGHT: 66 IN | DIASTOLIC BLOOD PRESSURE: 76 MMHG

## 2019-09-27 DIAGNOSIS — I10 ESSENTIAL HYPERTENSION: Primary | ICD-10-CM

## 2019-09-27 DIAGNOSIS — G89.29 CHRONIC PAIN OF BOTH KNEES: ICD-10-CM

## 2019-09-27 DIAGNOSIS — K21.9 GASTROESOPHAGEAL REFLUX DISEASE WITHOUT ESOPHAGITIS: ICD-10-CM

## 2019-09-27 DIAGNOSIS — M47.812 DEGENERATIVE JOINT DISEASE OF CERVICAL AND LUMBAR SPINE: Chronic | ICD-10-CM

## 2019-09-27 DIAGNOSIS — M25.561 CHRONIC PAIN OF BOTH KNEES: ICD-10-CM

## 2019-09-27 DIAGNOSIS — M47.816 DEGENERATIVE JOINT DISEASE OF CERVICAL AND LUMBAR SPINE: Chronic | ICD-10-CM

## 2019-09-27 DIAGNOSIS — M25.562 CHRONIC PAIN OF BOTH KNEES: ICD-10-CM

## 2019-09-27 DIAGNOSIS — E78.00 PURE HYPERCHOLESTEROLEMIA: ICD-10-CM

## 2019-09-27 PROCEDURE — 99213 OFFICE O/P EST LOW 20 MIN: CPT | Performed by: FAMILY MEDICINE

## 2019-09-27 RX ORDER — TRAMADOL HYDROCHLORIDE 50 MG/1
100 TABLET, COATED ORAL EVERY 8 HOURS
Qty: 180 TABLET | Refills: 0 | Status: SHIPPED | OUTPATIENT
Start: 2019-09-27 | End: 2019-11-15 | Stop reason: SDUPTHER

## 2019-09-27 RX ORDER — FUROSEMIDE 20 MG/1
20 TABLET ORAL DAILY PRN
Qty: 60 TABLET | Refills: 1 | Status: SHIPPED | OUTPATIENT
Start: 2019-09-27 | End: 2020-05-27

## 2019-09-27 RX ORDER — ESOMEPRAZOLE MAGNESIUM 40 MG/1
CAPSULE, DELAYED RELEASE ORAL
Qty: 60 CAPSULE | Refills: 5 | Status: SHIPPED | OUTPATIENT
Start: 2019-09-27 | End: 2020-04-13 | Stop reason: SDUPTHER

## 2019-09-27 RX ORDER — PREDNISONE 20 MG/1
TABLET ORAL
Qty: 24 TABLET | Refills: 1 | Status: SHIPPED | OUTPATIENT
Start: 2019-09-27 | End: 2019-11-22 | Stop reason: ALTCHOICE

## 2019-09-27 ASSESSMENT — ENCOUNTER SYMPTOMS
CONSTIPATION: 0
COUGH: 0
NAUSEA: 0
EYE PAIN: 0
ABDOMINAL PAIN: 0
BELCHING: 0
CHEST TIGHTNESS: 0
WHEEZING: 0
BACK PAIN: 1
SORE THROAT: 0
ORTHOPNEA: 0
SHORTNESS OF BREATH: 0

## 2019-09-27 NOTE — PROGRESS NOTES
has normal reflexes. No cranial nerve deficit. Skin: Skin is warm and dry. No rash noted. Psychiatric: She has a normal mood and affect. Nursing note and vitals reviewed. Assessment:       Diagnosis Orders   1. Essential hypertension  furosemide (LASIX) 20 MG tablet   2. Gastroesophageal reflux disease without esophagitis  esomeprazole (NEXIUM) 40 MG delayed release capsule   3. Degenerative joint disease of cervical and lumbar spine  ULTRAM 50 MG tablet   4. Chronic pain of both knees  ULTRAM 50 MG tablet   5. Pure hypercholesterolemia           Plan:      Current Outpatient Medications   Medication Sig Dispense Refill    ULTRAM 50 MG tablet Take 2 tablets by mouth every 8 hours for 30 days. 180 tablet 0    furosemide (LASIX) 20 MG tablet Take 1 tablet by mouth daily as needed (leg  edema) 60 tablet 1    oxaprozin (DAYPRO) 600 MG tablet TAKE 1 TABLET BY MOUTH TWICE A DAY 60 tablet 3    potassium chloride (KLOR-CON M10) 10 MEQ extended release tablet Take 1 tablet by mouth daily 30 tablet 5    ipratropium (ATROVENT) 0.06 % nasal spray INSTILL 2 SPRAYS BY NASAL ROUTE 2 TIMES DAILY 15 mL 5    esomeprazole (NEXIUM) 40 MG delayed release capsule TAKE 1 CAPSULE BY MOUTH 2 TIMES DAILY. 60 capsule 5    ferrous sulfate 325 (65 Fe) MG tablet Take 325 mg by mouth every other day       hydrochlorothiazide (HYDRODIURIL) 25 MG tablet Take 1 tablet by mouth every other day 45 tablet 3    metaxalone (SKELAXIN) 800 MG tablet TAKE 1 TABLET BY MOUTH 3 TIMES DAILY (Patient taking differently: 3 times daily as needed TAKE 1 TABLET BY MOUTH 3 TIMES DAILY) 90 tablet 3    nitroGLYCERIN (NITROLINGUAL) 0.4 MG/SPRAY spray Place 1 spray under the tongue every 5 minutes as needed for Chest pain. 1 Bottle 1    Cholecalciferol (VITAMIN D3) 3000 UNITS TABS Take 5,000 Units by mouth daily.  folic acid (FOLVITE) 1 MG tablet Take 1 mg by mouth daily.         estrogens, conjugated, (PREMARIN) 0.9 MG tablet Take 0.9 mg by

## 2019-10-28 ENCOUNTER — TELEPHONE (OUTPATIENT)
Dept: FAMILY MEDICINE CLINIC | Age: 64
End: 2019-10-28

## 2019-10-28 DIAGNOSIS — F43.22 ADJUSTMENT DISORDER WITH ANXIETY: Primary | ICD-10-CM

## 2019-10-29 RX ORDER — ALPRAZOLAM 0.5 MG/1
0.5 TABLET ORAL 3 TIMES DAILY PRN
Qty: 6 TABLET | Refills: 0 | Status: SHIPPED | OUTPATIENT
Start: 2019-10-29 | End: 2019-11-28

## 2019-11-07 DIAGNOSIS — J30.1 NON-SEASONAL ALLERGIC RHINITIS DUE TO POLLEN: ICD-10-CM

## 2019-11-10 RX ORDER — IPRATROPIUM BROMIDE 42 UG/1
SPRAY, METERED NASAL
Qty: 1 BOTTLE | Refills: 5 | Status: SHIPPED | OUTPATIENT
Start: 2019-11-10 | End: 2020-05-27

## 2019-11-15 DIAGNOSIS — M47.812 DEGENERATIVE JOINT DISEASE OF CERVICAL AND LUMBAR SPINE: Chronic | ICD-10-CM

## 2019-11-15 DIAGNOSIS — M25.561 CHRONIC PAIN OF BOTH KNEES: ICD-10-CM

## 2019-11-15 DIAGNOSIS — M47.816 DEGENERATIVE JOINT DISEASE OF CERVICAL AND LUMBAR SPINE: Chronic | ICD-10-CM

## 2019-11-15 DIAGNOSIS — M25.562 CHRONIC PAIN OF BOTH KNEES: ICD-10-CM

## 2019-11-15 DIAGNOSIS — G89.29 CHRONIC PAIN OF BOTH KNEES: ICD-10-CM

## 2019-11-15 RX ORDER — TRAMADOL HYDROCHLORIDE 50 MG/1
100 TABLET, COATED ORAL EVERY 8 HOURS
Qty: 180 TABLET | Refills: 0 | Status: SHIPPED | OUTPATIENT
Start: 2019-11-15 | End: 2019-11-22 | Stop reason: SDUPTHER

## 2019-11-22 ENCOUNTER — OFFICE VISIT (OUTPATIENT)
Dept: FAMILY MEDICINE CLINIC | Age: 64
End: 2019-11-22

## 2019-11-22 VITALS
DIASTOLIC BLOOD PRESSURE: 90 MMHG | SYSTOLIC BLOOD PRESSURE: 160 MMHG | HEART RATE: 76 BPM | HEIGHT: 66 IN | WEIGHT: 166.5 LBS | BODY MASS INDEX: 26.76 KG/M2 | RESPIRATION RATE: 14 BRPM

## 2019-11-22 DIAGNOSIS — M25.562 CHRONIC PAIN OF BOTH KNEES: ICD-10-CM

## 2019-11-22 DIAGNOSIS — G89.29 CHRONIC PAIN OF BOTH KNEES: ICD-10-CM

## 2019-11-22 DIAGNOSIS — M25.561 CHRONIC PAIN OF BOTH KNEES: ICD-10-CM

## 2019-11-22 DIAGNOSIS — M47.816 DEGENERATIVE JOINT DISEASE OF CERVICAL AND LUMBAR SPINE: Chronic | ICD-10-CM

## 2019-11-22 DIAGNOSIS — K21.9 GASTROESOPHAGEAL REFLUX DISEASE WITHOUT ESOPHAGITIS: Primary | ICD-10-CM

## 2019-11-22 DIAGNOSIS — M47.812 DEGENERATIVE JOINT DISEASE OF CERVICAL AND LUMBAR SPINE: Chronic | ICD-10-CM

## 2019-11-22 DIAGNOSIS — E78.00 PURE HYPERCHOLESTEROLEMIA: ICD-10-CM

## 2019-11-22 DIAGNOSIS — I10 ESSENTIAL HYPERTENSION: ICD-10-CM

## 2019-11-22 PROCEDURE — 99213 OFFICE O/P EST LOW 20 MIN: CPT | Performed by: FAMILY MEDICINE

## 2019-11-22 RX ORDER — POTASSIUM CHLORIDE 750 MG/1
10 TABLET, EXTENDED RELEASE ORAL DAILY
Qty: 30 TABLET | Refills: 5 | Status: SHIPPED | OUTPATIENT
Start: 2019-11-22 | End: 2020-02-12 | Stop reason: SDUPTHER

## 2019-11-22 RX ORDER — GABAPENTIN 300 MG/1
300 CAPSULE ORAL 2 TIMES DAILY
Qty: 60 CAPSULE | Refills: 5 | Status: SHIPPED | OUTPATIENT
Start: 2019-11-22 | End: 2020-09-25

## 2019-11-22 RX ORDER — HYDROCHLOROTHIAZIDE 25 MG/1
25 TABLET ORAL EVERY OTHER DAY
Qty: 45 TABLET | Refills: 3 | Status: SHIPPED | OUTPATIENT
Start: 2019-11-22 | End: 2020-02-06 | Stop reason: SDUPTHER

## 2019-11-22 RX ORDER — TRAMADOL HYDROCHLORIDE 50 MG/1
100 TABLET, COATED ORAL EVERY 8 HOURS
Qty: 180 TABLET | Refills: 0 | Status: SHIPPED | OUTPATIENT
Start: 2019-11-22 | End: 2019-12-22

## 2019-11-22 ASSESSMENT — ENCOUNTER SYMPTOMS
COUGH: 0
NAUSEA: 0
EYE PAIN: 0
CHEST TIGHTNESS: 0
CONSTIPATION: 0
SORE THROAT: 0
ABDOMINAL PAIN: 0
SHORTNESS OF BREATH: 0
WHEEZING: 0

## 2019-11-23 ENCOUNTER — HOSPITAL ENCOUNTER (OUTPATIENT)
Age: 64
Discharge: HOME | End: 2019-11-23
Payer: COMMERCIAL

## 2019-11-23 DIAGNOSIS — I10 ESSENTIAL HYPERTENSION: ICD-10-CM

## 2019-11-23 DIAGNOSIS — E78.00 PURE HYPERCHOLESTEROLEMIA: ICD-10-CM

## 2019-11-23 LAB
ALBUMIN SERPL-MCNC: 3.9 G/DL (ref 3.5–5.1)
ALP BLD-CCNC: 64 U/L (ref 38–126)
ALT SERPL-CCNC: 15 U/L (ref 11–66)
ANION GAP SERPL CALCULATED.3IONS-SCNC: 14 MEQ/L (ref 8–16)
AST SERPL-CCNC: 28 U/L (ref 5–40)
BASOPHILS # BLD: 0.7 %
BASOPHILS ABSOLUTE: 0 THOU/MM3 (ref 0–0.1)
BILIRUB SERPL-MCNC: 0.4 MG/DL (ref 0.3–1.2)
BUN BLDV-MCNC: 20 MG/DL (ref 7–22)
CALCIUM SERPL-MCNC: 9.2 MG/DL (ref 8.5–10.5)
CHLORIDE BLD-SCNC: 100 MEQ/L (ref 98–111)
CHOLESTEROL, TOTAL: 258 MG/DL (ref 100–199)
CO2: 27 MEQ/L (ref 23–33)
CREAT SERPL-MCNC: 0.7 MG/DL (ref 0.4–1.2)
EOSINOPHIL # BLD: 2.2 %
EOSINOPHILS ABSOLUTE: 0.1 THOU/MM3 (ref 0–0.4)
ERYTHROCYTE [DISTWIDTH] IN BLOOD BY AUTOMATED COUNT: 13.2 % (ref 11.5–14.5)
ERYTHROCYTE [DISTWIDTH] IN BLOOD BY AUTOMATED COUNT: 43.4 FL (ref 35–45)
GFR SERPL CREATININE-BSD FRML MDRD: 85 ML/MIN/1.73M2
GLUCOSE BLD-MCNC: 107 MG/DL (ref 70–108)
HCT VFR BLD CALC: 39.8 % (ref 37–47)
HDLC SERPL-MCNC: 99 MG/DL
HEMOGLOBIN: 12.8 GM/DL (ref 12–16)
IMMATURE GRANS (ABS): 0.01 THOU/MM3 (ref 0–0.07)
IMMATURE GRANULOCYTES: 0.2 %
LDL CHOLESTEROL CALCULATED: 141 MG/DL
LYMPHOCYTES # BLD: 31.9 %
LYMPHOCYTES ABSOLUTE: 1.3 THOU/MM3 (ref 1–4.8)
MCH RBC QN AUTO: 28.9 PG (ref 26–33)
MCHC RBC AUTO-ENTMCNC: 32.2 GM/DL (ref 32.2–35.5)
MCV RBC AUTO: 89.8 FL (ref 81–99)
MONOCYTES # BLD: 12.3 %
MONOCYTES ABSOLUTE: 0.5 THOU/MM3 (ref 0.4–1.3)
NUCLEATED RED BLOOD CELLS: 0 /100 WBC
PLATELET # BLD: 249 THOU/MM3 (ref 130–400)
PMV BLD AUTO: 9.4 FL (ref 9.4–12.4)
POTASSIUM SERPL-SCNC: 3.2 MEQ/L (ref 3.5–5.2)
RBC # BLD: 4.43 MILL/MM3 (ref 4.2–5.4)
SEG NEUTROPHILS: 52.7 %
SEGMENTED NEUTROPHILS ABSOLUTE COUNT: 2.2 THOU/MM3 (ref 1.8–7.7)
SODIUM BLD-SCNC: 141 MEQ/L (ref 135–145)
TOTAL PROTEIN: 6.5 G/DL (ref 6.1–8)
TRIGL SERPL-MCNC: 89 MG/DL (ref 0–199)
TSH SERPL DL<=0.05 MIU/L-ACNC: 1.46 UIU/ML (ref 0.4–4.2)
WBC # BLD: 4.1 THOU/MM3 (ref 4.8–10.8)

## 2019-11-23 PROCEDURE — 80061 LIPID PANEL: CPT

## 2019-11-23 PROCEDURE — 85025 COMPLETE CBC W/AUTO DIFF WBC: CPT

## 2019-11-23 PROCEDURE — 80053 COMPREHEN METABOLIC PANEL: CPT

## 2019-11-23 PROCEDURE — 84443 ASSAY THYROID STIM HORMONE: CPT

## 2019-11-23 PROCEDURE — 36415 COLL VENOUS BLD VENIPUNCTURE: CPT

## 2019-11-25 ENCOUNTER — TELEPHONE (OUTPATIENT)
Dept: FAMILY MEDICINE CLINIC | Age: 64
End: 2019-11-25

## 2019-12-16 DIAGNOSIS — M47.816 DEGENERATIVE JOINT DISEASE OF CERVICAL AND LUMBAR SPINE: Chronic | ICD-10-CM

## 2019-12-16 DIAGNOSIS — G89.29 CHRONIC PAIN OF BOTH KNEES: ICD-10-CM

## 2019-12-16 DIAGNOSIS — M47.812 DEGENERATIVE JOINT DISEASE OF CERVICAL AND LUMBAR SPINE: Chronic | ICD-10-CM

## 2019-12-16 DIAGNOSIS — M25.561 CHRONIC PAIN OF BOTH KNEES: ICD-10-CM

## 2019-12-16 DIAGNOSIS — M25.562 CHRONIC PAIN OF BOTH KNEES: ICD-10-CM

## 2019-12-17 RX ORDER — TRAMADOL HYDROCHLORIDE 50 MG/1
100 TABLET, COATED ORAL EVERY 8 HOURS
Qty: 180 TABLET | Refills: 0 | OUTPATIENT
Start: 2019-12-17 | End: 2020-01-16

## 2019-12-20 ENCOUNTER — TELEPHONE (OUTPATIENT)
Dept: FAMILY MEDICINE CLINIC | Age: 64
End: 2019-12-20

## 2019-12-20 RX ORDER — CEPHALEXIN 500 MG/1
500 CAPSULE ORAL 3 TIMES DAILY
Qty: 30 CAPSULE | Refills: 0 | Status: SHIPPED | OUTPATIENT
Start: 2019-12-20 | End: 2019-12-30

## 2020-01-10 ENCOUNTER — OFFICE VISIT (OUTPATIENT)
Dept: FAMILY MEDICINE CLINIC | Age: 65
End: 2020-01-10

## 2020-01-10 VITALS
RESPIRATION RATE: 18 BRPM | BODY MASS INDEX: 26.28 KG/M2 | HEART RATE: 88 BPM | HEIGHT: 66 IN | WEIGHT: 163.5 LBS | SYSTOLIC BLOOD PRESSURE: 170 MMHG | DIASTOLIC BLOOD PRESSURE: 88 MMHG

## 2020-01-10 PROCEDURE — 99213 OFFICE O/P EST LOW 20 MIN: CPT | Performed by: FAMILY MEDICINE

## 2020-01-10 RX ORDER — TRAMADOL HYDROCHLORIDE 50 MG/1
100 TABLET ORAL 3 TIMES DAILY
Qty: 180 TABLET | Refills: 0 | Status: SHIPPED | OUTPATIENT
Start: 2020-01-10 | End: 2020-02-12 | Stop reason: SDUPTHER

## 2020-01-10 RX ORDER — VALSARTAN 40 MG/1
40 TABLET ORAL DAILY
Qty: 30 TABLET | Refills: 3 | Status: SHIPPED | OUTPATIENT
Start: 2020-01-10 | End: 2020-12-21

## 2020-01-10 ASSESSMENT — ENCOUNTER SYMPTOMS
CONSTIPATION: 0
NAUSEA: 0
EYE PAIN: 0
HOARSE VOICE: 0
SHORTNESS OF BREATH: 0
COUGH: 0
WHEEZING: 0
CHEST TIGHTNESS: 0
ABDOMINAL PAIN: 0
SORE THROAT: 0
ORTHOPNEA: 0

## 2020-01-10 ASSESSMENT — PATIENT HEALTH QUESTIONNAIRE - PHQ9
SUM OF ALL RESPONSES TO PHQ QUESTIONS 1-9: 0
SUM OF ALL RESPONSES TO PHQ QUESTIONS 1-9: 0
2. FEELING DOWN, DEPRESSED OR HOPELESS: 0
1. LITTLE INTEREST OR PLEASURE IN DOING THINGS: 0
SUM OF ALL RESPONSES TO PHQ9 QUESTIONS 1 & 2: 0

## 2020-01-10 NOTE — PROGRESS NOTES
pain, constipation and nausea. Genitourinary: Negative for dysuria and frequency. Musculoskeletal: Negative for arthralgias, joint swelling, neck pain and neck stiffness. Skin: Negative for rash. Neurological: Negative for dizziness, weakness, numbness and headaches. Hematological: Negative for adenopathy. Does not bruise/bleed easily. Psychiatric/Behavioral: Negative for behavioral problems and sleep disturbance. The patient is not nervous/anxious. BP (!) 170/88 (Site: Right Upper Arm, Position: Sitting, Cuff Size: Medium Adult)   Pulse 88   Resp 18   Ht 5' 6\" (1.676 m)   Wt 163 lb 8 oz (74.2 kg)   BMI 26.39 kg/m²   Objective:   Physical Exam  Vitals signs and nursing note reviewed. Constitutional:       Appearance: She is well-developed. HENT:      Head: Normocephalic and atraumatic. Right Ear: External ear normal.      Left Ear: External ear normal.      Nose: Nose normal.   Eyes:      General: No scleral icterus. Conjunctiva/sclera: Conjunctivae normal.      Pupils: Pupils are equal, round, and reactive to light. Neck:      Musculoskeletal: Normal range of motion and neck supple. Thyroid: No thyromegaly. Vascular: No JVD. Cardiovascular:      Rate and Rhythm: Normal rate and regular rhythm. Heart sounds: Normal heart sounds. Pulmonary:      Effort: Pulmonary effort is normal.      Breath sounds: Normal breath sounds. No wheezing or rales. Abdominal:      General: Bowel sounds are normal. There is no distension. Palpations: Abdomen is soft. There is no mass. Tenderness: There is no tenderness. Musculoskeletal: Normal range of motion. General: No tenderness. Comments:   Low  Back pain  Noted and  Hip and  knee  pain   Lymphadenopathy:      Cervical: No cervical adenopathy. Skin:     General: Skin is warm and dry. Findings: No rash. Neurological:      Mental Status: She is alert and oriented to person, place, and time.

## 2020-01-28 ENCOUNTER — TELEPHONE (OUTPATIENT)
Dept: FAMILY MEDICINE CLINIC | Age: 65
End: 2020-01-28

## 2020-01-28 RX ORDER — FLUCONAZOLE 150 MG/1
TABLET ORAL
Qty: 2 TABLET | Refills: 1 | Status: SHIPPED | OUTPATIENT
Start: 2020-01-28 | End: 2020-03-11 | Stop reason: ALTCHOICE

## 2020-01-28 RX ORDER — PREDNISONE 20 MG/1
TABLET ORAL
Qty: 24 TABLET | Refills: 0 | Status: SHIPPED | OUTPATIENT
Start: 2020-01-28 | End: 2020-03-11 | Stop reason: ALTCHOICE

## 2020-01-28 NOTE — TELEPHONE ENCOUNTER
Date of last visit:  1/10/2020  Date of next visit:  3/11/2020    Requested Prescriptions     Signed Prescriptions Disp Refills    predniSONE (DELTASONE) 20 MG tablet 24 tablet 0     Sig: Take 1 tablet, PO TID x 3 days then BID x 5 days then daily x 5 days     Authorizing Provider: Diego Brandt User: FABBY KINCAID    fluconazole (DIFLUCAN) 150 MG tablet 2 tablet 1     Sig: Take 1 tablet, PO now and repeat in 2-3 days     Authorizing Provider: Diego Brandt User: FABBY KINCAID       Patient called and requested the approved medications. She is having a lot of back pain, hip pain, leg pain from standing on her feet 8-9 hours a day at work.

## 2020-02-06 ENCOUNTER — OFFICE VISIT (OUTPATIENT)
Dept: CARDIOLOGY CLINIC | Age: 65
End: 2020-02-06
Payer: COMMERCIAL

## 2020-02-06 VITALS
SYSTOLIC BLOOD PRESSURE: 168 MMHG | HEART RATE: 92 BPM | WEIGHT: 165 LBS | HEIGHT: 66 IN | BODY MASS INDEX: 26.52 KG/M2 | DIASTOLIC BLOOD PRESSURE: 90 MMHG

## 2020-02-06 PROCEDURE — 93000 ELECTROCARDIOGRAM COMPLETE: CPT | Performed by: NUCLEAR MEDICINE

## 2020-02-06 PROCEDURE — 99214 OFFICE O/P EST MOD 30 MIN: CPT | Performed by: NUCLEAR MEDICINE

## 2020-02-06 RX ORDER — HYDROCHLOROTHIAZIDE 25 MG/1
25 TABLET ORAL EVERY OTHER DAY
Qty: 45 TABLET | Refills: 3 | Status: SHIPPED | OUTPATIENT
Start: 2020-02-06 | End: 2020-12-09 | Stop reason: SDUPTHER

## 2020-02-06 NOTE — PROGRESS NOTES
Jeannine @ [facility] 15    TOTAL KNEE ARTHROPLASTY Left 2010    analisa    TRANSTHORACIC ECHOCARDIOGRAM  2009    EF 50% MILD LT VENTRICULAR HYPERTROPHY,TRIVIAL MR AND TR, TRIVIAL AORTIC INSUFFICIENCY,DIASTOLIC DYSFUNCTION OF THE LT VENTRICLE    TRANSTHORACIC ECHOCARDIOGRAM  2005    EF 60% C TRACE MR AND MILD TR    UPPER GASTROINTESTINAL ENDOSCOPY      [facility]     Family History   Problem Relation Age of Onset    Heart Disease Mother     Hypertension Mother     Heart Disease Father     Hypertension Father     Stroke Father      Social History     Tobacco Use    Smoking status: Former Smoker     Packs/day: 1.00     Years: 3.00     Pack years: 3.00     Last attempt to quit: 1975     Years since quittin.2    Smokeless tobacco: Never Used   Substance Use Topics    Alcohol use: No      Current Outpatient Medications   Medication Sig Dispense Refill    predniSONE (DELTASONE) 20 MG tablet Take 1 tablet, PO TID x 3 days then BID x 5 days then daily x 5 days 24 tablet 0    fluconazole (DIFLUCAN) 150 MG tablet Take 1 tablet, PO now and repeat in 2-3 days 2 tablet 1    traMADol (ULTRAM) 50 MG tablet Take 2 tablets by mouth 3 times daily for 30 days. 180 tablet 0    valsartan (DIOVAN) 40 MG tablet Take 1 tablet by mouth daily 30 tablet 3    hydrochlorothiazide (HYDRODIURIL) 25 MG tablet Take 1 tablet by mouth every other day 45 tablet 3    gabapentin (NEURONTIN) 300 MG capsule Take 1 capsule by mouth 2 times daily for 180 days. 60 capsule 5    potassium chloride (KLOR-CON M10) 10 MEQ extended release tablet Take 1 tablet by mouth daily 30 tablet 5    diclofenac sodium 1 % GEL Apply 2 g topically 4 times daily 1 Tube 5    ipratropium (ATROVENT) 0.06 % nasal spray INSTILL 2 SPRAYS BY NASAL ROUTE 2 TIMES DAILY 1 Bottle 5    esomeprazole (NEXIUM) 40 MG delayed release capsule TAKE 1 CAPSULE BY MOUTH 2 TIMES DAILY.  60 capsule 5    furosemide (LASIX) 20 MG tablet Take 1 11/23/2020    Breast cancer screen  02/26/2021    Lipid screen  11/23/2024    Colon cancer screen colonoscopy  11/06/2028    Hepatitis C screen  Completed    HIV screen  Completed    Hepatitis A vaccine  Aged Out    Hepatitis B vaccine  Aged Out    Hib vaccine  Aged Out    Meningococcal (ACWY) vaccine  Aged Out    Pneumococcal 0-64 years Vaccine  Aged Out       Subjective:  Review of Systems  General:   No fever, no chills, some fatigue or weight loss  Pulmonary:    some dyspnea, no wheezing  Cardiac:    Denies recent chest pain,   GI:     No nausea or vomiting, no abdominal pain  Neuro:     No dizziness or light headedness,   Musculoskeletal:  No recent active issues  Extremities:   No edema, no obvious claudication       Objective:  Physical Exam  BP (!) 174/94   Pulse 92   Ht 5' 6\" (1.676 m)   Wt 165 lb (74.8 kg)   BMI 26.63 kg/m²   General:   Well developed, well nourished  Lungs:    Clear to auscultation  Heart:    Normal S1 S2, Slight murmur. no rubs, no gallops  Abdomen:   Soft, non tender, no organomegalies, positive bowel sounds  Extremities:   No edema, no cyanosis, good peripheral pulses  Neurological:   Awake, alert, oriented. No obvious focal deficits  Musculoskelatal:  No obvious deformities    Assessment:      Diagnosis Orders   1. Essential hypertension  EKG 12 lead    hydrochlorothiazide (HYDRODIURIL) 25 MG tablet   2. Familial hypercholesterolemia  EKG 12 lead   cardiac fair for now   Higher BP   She had issues with higher dose meds  Doesn't want to be on more meds   ECG in office was done today. I reviewed the ECG. No acute findings      Plan:  No follow-ups on file. Discussed increasing her diovan   She doesn't want that   Consider a follow up stress test and echo   She prefers to wait for now  Monitor the BP  Continue risk factor modification and medical management  Thank you for allowing me to participate in the care of your patient.  Please don't hesitate to contact me regarding any further issues related to the patient care    Orders Placed:  Orders Placed This Encounter   Procedures    EKG 12 lead     Order Specific Question:   Reason for Exam?     Answer: Other       Medications Prescribed:  No orders of the defined types were placed in this encounter. Discussed use, benefit, and side effects of prescribed medications. All patient questions answered. Pt voicedunderstanding. Instructed to continue current medications, diet and exercise. Continue risk factor modification and medical management. Patient agreed with treatment plan. Follow up as directed.     Electronically signedby Vera Live MD on 2/6/2020 at 7:37 AM

## 2020-02-11 NOTE — TELEPHONE ENCOUNTER
Date of last visit:  1/10/2020  Date of next visit:  3/11/2020    Requested Prescriptions     Pending Prescriptions Disp Refills    traMADol (ULTRAM) 50 MG tablet 180 tablet 0     Sig: Take 2 tablets by mouth 3 times daily for 30 days.     potassium chloride (KLOR-CON M10) 10 MEQ extended release tablet 30 tablet 5     Sig: Take 1 tablet by mouth daily

## 2020-02-12 RX ORDER — POTASSIUM CHLORIDE 750 MG/1
10 TABLET, EXTENDED RELEASE ORAL DAILY
Qty: 30 TABLET | Refills: 5 | Status: SHIPPED | OUTPATIENT
Start: 2020-02-12 | End: 2021-01-13

## 2020-02-12 RX ORDER — TRAMADOL HYDROCHLORIDE 50 MG/1
100 TABLET ORAL 3 TIMES DAILY
Qty: 180 TABLET | Refills: 0 | Status: SHIPPED | OUTPATIENT
Start: 2020-02-12 | End: 2020-03-11 | Stop reason: SDUPTHER

## 2020-03-11 ENCOUNTER — OFFICE VISIT (OUTPATIENT)
Dept: FAMILY MEDICINE CLINIC | Age: 65
End: 2020-03-11

## 2020-03-11 VITALS
DIASTOLIC BLOOD PRESSURE: 86 MMHG | WEIGHT: 164 LBS | RESPIRATION RATE: 16 BRPM | SYSTOLIC BLOOD PRESSURE: 138 MMHG | HEIGHT: 66 IN | HEART RATE: 84 BPM | BODY MASS INDEX: 26.36 KG/M2

## 2020-03-11 PROBLEM — I73.00 RAYNAUD'S PHENOMENON WITHOUT GANGRENE: Status: ACTIVE | Noted: 2020-03-11

## 2020-03-11 PROCEDURE — 99213 OFFICE O/P EST LOW 20 MIN: CPT | Performed by: FAMILY MEDICINE

## 2020-03-11 RX ORDER — TRAMADOL HYDROCHLORIDE 50 MG/1
100 TABLET ORAL 3 TIMES DAILY
Qty: 180 TABLET | Refills: 0 | Status: SHIPPED | OUTPATIENT
Start: 2020-03-11 | End: 2020-04-13

## 2020-03-11 RX ORDER — AMLODIPINE BESYLATE 2.5 MG/1
2.5 TABLET ORAL DAILY
Qty: 30 TABLET | Refills: 2 | Status: SHIPPED | OUTPATIENT
Start: 2020-03-11 | End: 2021-03-04 | Stop reason: ALTCHOICE

## 2020-03-11 ASSESSMENT — ENCOUNTER SYMPTOMS
ORTHOPNEA: 0
EYE PAIN: 0
CONSTIPATION: 0
CHEST TIGHTNESS: 0
SHORTNESS OF BREATH: 0
NAUSEA: 0
COUGH: 0
SORE THROAT: 0
ABDOMINAL PAIN: 0
WHEEZING: 0

## 2020-03-11 NOTE — PROGRESS NOTES
Subjective:      Patient ID: Tia Portillo is a 62 y.o. female. raynauds  Hands and  Feet      and  With pain        knee and  Back pain     Noted  Tramadol       Adjustment  Disorder  With  Depressed  mood       gerd   Noted       Hypertension   This is a chronic problem. The current episode started more than 1 year ago. The problem has been resolved since onset. The problem is controlled. Pertinent negatives include no chest pain, headaches, neck pain, orthopnea, palpitations, peripheral edema or shortness of breath. The current treatment provides significant improvement. There are no compliance problems. Gastroesophageal Reflux   She reports no abdominal pain, no chest pain, no coughing, no early satiety, no nausea, no sore throat or no wheezing. This is a chronic problem. The current episode started more than 1 year ago. The problem occurs rarely. Pertinent negatives include no fatigue. She has tried a PPI for the symptoms. The treatment provided significant relief. Past Medical History:   Diagnosis Date    Acid reflux     Arthritis     Bleeding tendency (HCC)     Cancer ([facility] [address] 1989    cervical    Gallbladder disease 2010    History of scarlet fever     Hyperlipidemia     Hypertension     Joint pain     Medication intolerance     VERY SENSITIVE TO MEDICATIONS    Swelling      Review of Systems   Constitutional: Negative for appetite change, fatigue and fever. HENT: Negative for congestion, ear pain, postnasal drip and sore throat. Eyes: Negative for pain and visual disturbance. Respiratory: Negative for cough, chest tightness, shortness of breath and wheezing. Cardiovascular: Negative for chest pain, palpitations, orthopnea and leg swelling. Gastrointestinal: Negative for abdominal pain, constipation and nausea. Genitourinary: Negative for dysuria and frequency. Musculoskeletal: Negative for arthralgias, joint swelling, neck pain and neck stiffness.    Skin: Negative for rash.   Neurological: Negative for dizziness, weakness, numbness and headaches. Hematological: Negative for adenopathy. Does not bruise/bleed easily. Psychiatric/Behavioral: Negative for behavioral problems and sleep disturbance. The patient is not nervous/anxious. /86 (Site: Right Upper Arm, Position: Sitting, Cuff Size: Medium Adult)   Pulse 84   Resp 16   Ht 5' 6\" (1.676 m)   Wt 164 lb (74.4 kg)   BMI 26.47 kg/m²   Objective:   Physical Exam  Vitals signs and nursing note reviewed. Chaperone present:    xiphoid area with pain  noted and no  mass. Constitutional:       Appearance: She is well-developed. HENT:      Head: Normocephalic and atraumatic. Right Ear: External ear normal.      Left Ear: External ear normal.      Nose: Nose normal.   Eyes:      General: No scleral icterus. Conjunctiva/sclera: Conjunctivae normal.      Pupils: Pupils are equal, round, and reactive to light. Neck:      Musculoskeletal: Normal range of motion and neck supple. Thyroid: No thyromegaly. Vascular: No JVD. Cardiovascular:      Rate and Rhythm: Normal rate and regular rhythm. Heart sounds: Normal heart sounds. Pulmonary:      Effort: Pulmonary effort is normal.      Breath sounds: Normal breath sounds. No wheezing or rales. Abdominal:      General: Bowel sounds are normal. There is no distension. Palpations: Abdomen is soft. There is no mass. Tenderness: There is no abdominal tenderness. Musculoskeletal: Normal range of motion. General: No tenderness. Lymphadenopathy:      Cervical: No cervical adenopathy. Skin:     General: Skin is warm and dry. Findings: No rash. Neurological:      Mental Status: She is alert and oriented to person, place, and time. Cranial Nerves: No cranial nerve deficit. Deep Tendon Reflexes: Reflexes are normal and symmetric. Assessment:       Diagnosis Orders   1. Essential hypertension     2.

## 2020-03-25 PROBLEM — E78.5 HYPERLIPIDEMIA: Status: RESOLVED | Noted: 2020-03-25 | Resolved: 2020-03-24

## 2020-04-13 RX ORDER — TRAMADOL HYDROCHLORIDE 50 MG/1
100 TABLET ORAL 3 TIMES DAILY
Qty: 180 TABLET | Refills: 0 | Status: SHIPPED | OUTPATIENT
Start: 2020-04-13 | End: 2020-06-10 | Stop reason: SDUPTHER

## 2020-04-13 RX ORDER — ESOMEPRAZOLE MAGNESIUM 40 MG/1
CAPSULE, DELAYED RELEASE ORAL
Qty: 60 CAPSULE | Refills: 5 | Status: SHIPPED | OUTPATIENT
Start: 2020-04-13 | End: 2020-10-30 | Stop reason: SDUPTHER

## 2020-05-14 ENCOUNTER — TELEPHONE (OUTPATIENT)
Dept: FAMILY MEDICINE CLINIC | Age: 65
End: 2020-05-14

## 2020-05-15 RX ORDER — PREDNISONE 20 MG/1
TABLET ORAL
Qty: 24 TABLET | Refills: 0 | Status: SHIPPED | OUTPATIENT
Start: 2020-05-15 | End: 2020-06-10 | Stop reason: ALTCHOICE

## 2020-05-26 NOTE — TELEPHONE ENCOUNTER
Date of last visit:  3/11/2020  Date of next visit:  6/10/2020    Requested Prescriptions     Pending Prescriptions Disp Refills    furosemide (LASIX) 20 MG tablet [Pharmacy Med Name: FUROSEMIDE 20 MG TABLET] 60 tablet 1     Sig: TAKE 1 TABLET BY MOUTH DAILY AS NEEDED (LEG EDEMA)    ipratropium (ATROVENT) 0.06 % nasal spray [Pharmacy Med Name: IPRATROPIUM 0.06% SPRAY] 1 Bottle 5     Sig: INSTILL 2 SPRAYS BY NASAL ROUTE 2 TIMES DAILY

## 2020-05-27 RX ORDER — IPRATROPIUM BROMIDE 42 UG/1
SPRAY, METERED NASAL
Qty: 1 BOTTLE | Refills: 5 | Status: SHIPPED | OUTPATIENT
Start: 2020-05-27 | End: 2020-12-09 | Stop reason: SDUPTHER

## 2020-05-27 RX ORDER — FUROSEMIDE 20 MG/1
20 TABLET ORAL DAILY PRN
Qty: 60 TABLET | Refills: 1 | Status: SHIPPED | OUTPATIENT
Start: 2020-05-27 | End: 2020-09-23 | Stop reason: SDUPTHER

## 2020-06-10 ENCOUNTER — OFFICE VISIT (OUTPATIENT)
Dept: FAMILY MEDICINE CLINIC | Age: 65
End: 2020-06-10

## 2020-06-10 VITALS
DIASTOLIC BLOOD PRESSURE: 76 MMHG | SYSTOLIC BLOOD PRESSURE: 128 MMHG | HEART RATE: 86 BPM | RESPIRATION RATE: 16 BRPM | BODY MASS INDEX: 25.78 KG/M2 | TEMPERATURE: 98.1 F | WEIGHT: 160.38 LBS | HEIGHT: 66 IN

## 2020-06-10 PROCEDURE — 99213 OFFICE O/P EST LOW 20 MIN: CPT | Performed by: FAMILY MEDICINE

## 2020-06-10 RX ORDER — PREDNISONE 20 MG/1
TABLET ORAL
Qty: 24 TABLET | Refills: 0 | Status: SHIPPED | OUTPATIENT
Start: 2020-06-10 | End: 2020-11-17 | Stop reason: SDUPTHER

## 2020-06-10 RX ORDER — TRAMADOL HYDROCHLORIDE 50 MG/1
100 TABLET ORAL 3 TIMES DAILY
Qty: 180 TABLET | Refills: 0 | Status: SHIPPED | OUTPATIENT
Start: 2020-06-10 | End: 2020-07-10

## 2020-06-10 ASSESSMENT — ENCOUNTER SYMPTOMS
WHEEZING: 0
CONSTIPATION: 0
NAUSEA: 0
SORE THROAT: 0
SHORTNESS OF BREATH: 0
COUGH: 0
CHEST TIGHTNESS: 0
EYE PAIN: 0
ORTHOPNEA: 0
ABDOMINAL PAIN: 0

## 2020-06-10 NOTE — PROGRESS NOTES
daily 30 tablet 3    diclofenac sodium 1 % GEL Apply 2 g topically 4 times daily 1 Tube 5    ferrous sulfate 325 (65 Fe) MG tablet Take 325 mg by mouth every other day       metaxalone (SKELAXIN) 800 MG tablet TAKE 1 TABLET BY MOUTH 3 TIMES DAILY (Patient taking differently: 3 times daily as needed TAKE 1 TABLET BY MOUTH 3 TIMES DAILY) 90 tablet 3    nitroGLYCERIN (NITROLINGUAL) 0.4 MG/SPRAY spray Place 1 spray under the tongue every 5 minutes as needed for Chest pain. 1 Bottle 1    Cholecalciferol (VITAMIN D3) 3000 UNITS TABS Take 5,000 Units by mouth daily.  folic acid (FOLVITE) 1 MG tablet Take 1 mg by mouth daily.  estrogens, conjugated, (PREMARIN) 0.9 MG tablet Take 0.9 mg by mouth daily.  Ascorbic Acid (VITAMIN C) 500 MG tablet Take 1,000 mg by mouth daily.  gabapentin (NEURONTIN) 300 MG capsule Take 1 capsule by mouth 2 times daily for 180 days. 60 capsule 5     No current facility-administered medications for this visit. Orders Placed This Encounter   Procedures   Miguelito Collado MD, Orthopedic Surgery, [facility]     Referral Priority:   Routine     Referral Type:   Eval and Treat     Referral Reason:   Specialty Services Required     Referred to Provider:   Sky Hernández MD     Requested Specialty:   Orthopedic Surgery     Number of Visits Requested:   1       Taper  Of pdn  As  Needed     bp ok and  Use  Tramadol .     Foot pain  Noted and  To see  Dr Dale Sebastian MD

## 2020-07-07 ENCOUNTER — OFFICE VISIT (OUTPATIENT)
Dept: FAMILY MEDICINE CLINIC | Age: 65
End: 2020-07-07

## 2020-07-07 VITALS
HEIGHT: 66 IN | SYSTOLIC BLOOD PRESSURE: 138 MMHG | BODY MASS INDEX: 26.45 KG/M2 | RESPIRATION RATE: 12 BRPM | TEMPERATURE: 97.3 F | HEART RATE: 76 BPM | DIASTOLIC BLOOD PRESSURE: 72 MMHG | WEIGHT: 164.6 LBS

## 2020-07-07 PROCEDURE — 99213 OFFICE O/P EST LOW 20 MIN: CPT | Performed by: FAMILY MEDICINE

## 2020-07-07 ASSESSMENT — ENCOUNTER SYMPTOMS
COUGH: 0
EYE PAIN: 0
EYE REDNESS: 1
ABDOMINAL PAIN: 0
CONSTIPATION: 0
NAUSEA: 0
WHEEZING: 0
SORE THROAT: 0
CHEST TIGHTNESS: 0
SHORTNESS OF BREATH: 0

## 2020-07-07 NOTE — PROGRESS NOTES
Subjective:      Patient ID: Rita Faria is a 62 y.o. female. Eye Problem    The right eye is affected. This is a recurrent (  2  times   before ) problem. There was no injury mechanism. The patient is experiencing no pain. There is no known exposure to pink eye. She does not wear contacts. Associated symptoms include eye redness. Pertinent negatives include no fever, nausea or weakness. Associated symptoms comments:   Right  Lateral  Eye . She has tried nothing for the symptoms. The treatment provided mild relief. Past Medical History:   Diagnosis Date    Acid reflux     Arthritis     Bleeding tendency (HCC)     Cancer ([facility] [address] 1989    cervical    Gallbladder disease 2010    History of scarlet fever     Hyperlipidemia     Hypertension     Joint pain     Medication intolerance     VERY SENSITIVE TO MEDICATIONS    Medication intolerance     VERY SENSITIVE TO MEDICATIONS     Swelling      Review of Systems   Constitutional: Negative for appetite change, fatigue and fever. HENT: Negative for congestion, ear pain, postnasal drip and sore throat. Eyes: Positive for redness. Negative for pain and visual disturbance. Lateral  Conjunctiva  With blood  Noted       turning of  Eye   With   Some    discomfort  Looking laterally    Eye  With  Blood on  Sunday       Respiratory: Negative for cough, chest tightness, shortness of breath and wheezing. Cardiovascular: Negative for chest pain, palpitations and leg swelling. Gastrointestinal: Negative for abdominal pain, constipation and nausea. Genitourinary: Negative for dysuria and frequency. Musculoskeletal: Negative for arthralgias, joint swelling, neck pain and neck stiffness. Skin: Negative for rash. Neurological: Negative for dizziness, weakness, numbness and headaches. Hematological: Negative for adenopathy. Does not bruise/bleed easily. Psychiatric/Behavioral: Negative for behavioral problems and sleep disturbance.  The patient is not nervous/anxious. /72 (Site: Right Upper Arm, Position: Sitting, Cuff Size: Medium Adult)   Pulse 76   Temp 97.3 °F (36.3 °C)   Resp 12   Ht 5' 6\" (1.676 m)   Wt 164 lb 9.6 oz (74.7 kg)   BMI 26.57 kg/m²     Objective:   Physical Exam  Vitals signs and nursing note reviewed. Constitutional:       Appearance: She is well-developed. HENT:      Head: Normocephalic and atraumatic. Right Ear: External ear normal.      Left Ear: External ear normal.      Nose: Nose normal.   Eyes:      General: No scleral icterus. Conjunctiva/sclera: Conjunctivae normal.      Pupils: Pupils are equal, round, and reactive to light. Comments:  Right  Eye   Laterally   Noted  With  Blood  And  Conjunctiva  Of  Blood    With  Clot    Neck:      Musculoskeletal: Normal range of motion and neck supple. Thyroid: No thyromegaly. Vascular: No JVD. Cardiovascular:      Rate and Rhythm: Normal rate and regular rhythm. Heart sounds: Normal heart sounds. Pulmonary:      Effort: Pulmonary effort is normal.      Breath sounds: Normal breath sounds. No wheezing or rales. Abdominal:      General: Bowel sounds are normal. There is no distension. Palpations: Abdomen is soft. There is no mass. Tenderness: There is no abdominal tenderness. Musculoskeletal: Normal range of motion. General: No tenderness. Lymphadenopathy:      Cervical: No cervical adenopathy. Skin:     General: Skin is warm and dry. Findings: No rash. Neurological:      Mental Status: She is alert and oriented to person, place, and time. Cranial Nerves: No cranial nerve deficit. Deep Tendon Reflexes: Reflexes are normal and symmetric. Assessment:      .   Diagnosis Orders   1. Conjunctival hemorrhage of right eye     2.  Essential hypertension           Plan:      Current Outpatient Medications   Medication Sig Dispense Refill    traMADol (ULTRAM) 50 MG tablet Take 2 tablets by mouth 3 times daily for 30 days. 180 tablet 0    predniSONE (DELTASONE) 20 MG tablet One tab po tid for 3 days and then one tab po bid for 5 days and then one a day for 5 days 24 tablet 0    furosemide (LASIX) 20 MG tablet TAKE 1 TABLET BY MOUTH DAILY AS NEEDED (LEG EDEMA) 60 tablet 1    ipratropium (ATROVENT) 0.06 % nasal spray INSTILL 2 SPRAYS BY NASAL ROUTE 2 TIMES DAILY 1 Bottle 5    oxaprozin (DAYPRO) 600 MG tablet TAKE 1 TABLET BY MOUTH TWICE A DAY 60 tablet 5    esomeprazole (NEXIUM) 40 MG delayed release capsule TAKE 1 CAPSULE BY MOUTH 2 TIMES DAILY. 60 capsule 5    amLODIPine (NORVASC) 2.5 MG tablet Take 1 tablet by mouth daily 30 tablet 2    potassium chloride (KLOR-CON M10) 10 MEQ extended release tablet Take 1 tablet by mouth daily 30 tablet 5    hydrochlorothiazide (HYDRODIURIL) 25 MG tablet Take 1 tablet by mouth every other day 45 tablet 3    valsartan (DIOVAN) 40 MG tablet Take 1 tablet by mouth daily 30 tablet 3    diclofenac sodium 1 % GEL Apply 2 g topically 4 times daily 1 Tube 5    ferrous sulfate 325 (65 Fe) MG tablet Take 325 mg by mouth every other day       metaxalone (SKELAXIN) 800 MG tablet TAKE 1 TABLET BY MOUTH 3 TIMES DAILY (Patient taking differently: 3 times daily as needed TAKE 1 TABLET BY MOUTH 3 TIMES DAILY) 90 tablet 3    nitroGLYCERIN (NITROLINGUAL) 0.4 MG/SPRAY spray Place 1 spray under the tongue every 5 minutes as needed for Chest pain. 1 Bottle 1    Cholecalciferol (VITAMIN D3) 3000 UNITS TABS Take 5,000 Units by mouth daily.  folic acid (FOLVITE) 1 MG tablet Take 1 mg by mouth daily.  estrogens, conjugated, (PREMARIN) 0.9 MG tablet Take 0.9 mg by mouth daily.  Ascorbic Acid (VITAMIN C) 500 MG tablet Take 1,000 mg by mouth daily.  gabapentin (NEURONTIN) 300 MG capsule Take 1 capsule by mouth 2 times daily for 180 days. 60 capsule 5     No current facility-administered medications for this visit.         No orders of the defined types were placed in this encounter.     nautural  Tears and  Warm compresses   Toe  Eye krzysztof Tapia MD

## 2020-07-09 NOTE — TELEPHONE ENCOUNTER
Date of last visit:  7/7/2020  Date of next visit:  9/23/2020    Requested Prescriptions     Pending Prescriptions Disp Refills    traMADol (ULTRAM) 50 MG tablet [Pharmacy Med Name: TRAMADOL HCL 50 MG TABLET] 180 tablet 0     Sig: TAKE 2 TABLETS BY MOUTH 3 TIMES DAILY

## 2020-07-10 RX ORDER — TRAMADOL HYDROCHLORIDE 50 MG/1
TABLET ORAL
Qty: 180 TABLET | Refills: 0 | Status: SHIPPED | OUTPATIENT
Start: 2020-07-10 | End: 2020-08-10 | Stop reason: SDUPTHER

## 2020-08-10 NOTE — TELEPHONE ENCOUNTER
Date of last visit:  7/7/2020  Date of next visit:  9/23/2020    Requested Prescriptions     Pending Prescriptions Disp Refills    traMADol (ULTRAM) 50 MG tablet 180 tablet 0

## 2020-08-11 RX ORDER — TRAMADOL HYDROCHLORIDE 50 MG/1
100 TABLET ORAL 3 TIMES DAILY
Qty: 180 TABLET | Refills: 0 | Status: SHIPPED | OUTPATIENT
Start: 2020-08-11 | End: 2020-09-16

## 2020-09-14 NOTE — TELEPHONE ENCOUNTER
Date of last visit:  7/7/2020  Date of next visit:  9/23/2020    Requested Prescriptions     Pending Prescriptions Disp Refills    traMADol (ULTRAM) 50 MG tablet [Pharmacy Med Name: TRAMADOL HCL 50 MG TABLET] 180 tablet 0     Sig: TAKE 2 TABLETS BY MOUTH 3 TIMES DAILY FOR 30 DAYS. REDUCE DOSE AS PAIN BECOMES MANAGEABLE.

## 2020-09-16 RX ORDER — TRAMADOL HYDROCHLORIDE 50 MG/1
TABLET ORAL
Qty: 180 TABLET | Refills: 0 | Status: SHIPPED | OUTPATIENT
Start: 2020-09-16 | End: 2020-10-15 | Stop reason: SDUPTHER

## 2020-09-23 ENCOUNTER — OFFICE VISIT (OUTPATIENT)
Dept: FAMILY MEDICINE CLINIC | Age: 65
End: 2020-09-23

## 2020-09-23 VITALS
RESPIRATION RATE: 16 BRPM | BODY MASS INDEX: 25.55 KG/M2 | HEIGHT: 66 IN | HEART RATE: 76 BPM | DIASTOLIC BLOOD PRESSURE: 82 MMHG | WEIGHT: 159 LBS | SYSTOLIC BLOOD PRESSURE: 146 MMHG | TEMPERATURE: 96 F

## 2020-09-23 PROCEDURE — 99213 OFFICE O/P EST LOW 20 MIN: CPT | Performed by: FAMILY MEDICINE

## 2020-09-23 RX ORDER — FUROSEMIDE 20 MG/1
20 TABLET ORAL DAILY PRN
Qty: 60 TABLET | Refills: 1 | Status: SHIPPED | OUTPATIENT
Start: 2020-09-23 | End: 2020-12-09 | Stop reason: SDUPTHER

## 2020-09-23 ASSESSMENT — ENCOUNTER SYMPTOMS
EYE PAIN: 0
WHEEZING: 0
CHEST TIGHTNESS: 0
COUGH: 0
SHORTNESS OF BREATH: 0
SORE THROAT: 0
CONSTIPATION: 0
HEARTBURN: 0
ABDOMINAL PAIN: 0
ORTHOPNEA: 0
BELCHING: 0
NAUSEA: 0

## 2020-09-23 NOTE — PROGRESS NOTES
Subjective:      Patient ID: Mitch Roblero is a 62 y.o. female. Right   5th  Metacarpal    With  fx and  At  [facility]  On  8-31-20  Spl[place]  Noted       Back   Stable    And  Knee pain  And   Also    Foot pain      Mild  ashd  Stable     Hypertension   This is a chronic problem. The current episode started more than 1 year ago. The problem has been resolved since onset. The problem is controlled. Pertinent negatives include no chest pain, headaches, neck pain, orthopnea, palpitations, peripheral edema or shortness of breath. The current treatment provides significant improvement. There are no compliance problems. Gastroesophageal Reflux   She reports no abdominal pain, no belching, no chest pain, no coughing, no heartburn, no nausea, no sore throat or no wheezing. This is a chronic problem. The current episode started more than 1 year ago. The problem occurs rarely. The problem has been resolved. Pertinent negatives include no fatigue. She has tried a PPI for the symptoms. The treatment provided significant relief. Past Medical History:   Diagnosis Date    Acid reflux     Arthritis     Bleeding tendency (HCC)     Cancer ([facility] [address] 1989    cervical    Gallbladder disease 2010    History of scarlet fever     Hyperlipidemia     Hypertension     Joint pain     Medication intolerance     VERY SENSITIVE TO MEDICATIONS    Medication intolerance     VERY SENSITIVE TO MEDICATIONS     Swelling      Review of Systems   Constitutional: Negative for appetite change, fatigue and fever. HENT: Negative for congestion, ear pain, postnasal drip and sore throat. Eyes: Negative for pain and visual disturbance. Respiratory: Negative for cough, chest tightness, shortness of breath and wheezing. Cardiovascular: Negative for chest pain, palpitations, orthopnea and leg swelling. Gastrointestinal: Negative for abdominal pain, constipation, heartburn and nausea. Genitourinary: Negative for dysuria and frequency. Musculoskeletal: Negative for arthralgias, joint swelling, neck pain and neck stiffness. Skin: Negative for rash. Neurological: Negative for dizziness, weakness, numbness and headaches. Hematological: Negative for adenopathy. Does not bruise/bleed easily. Psychiatric/Behavioral: Negative for behavioral problems and sleep disturbance. The patient is not nervous/anxious. BP (!) 146/82 (Site: Right Upper Arm, Position: Sitting, Cuff Size: Medium Adult)   Pulse 76   Temp 96 °F (35.6 °C) (Oral)   Resp 16   Ht 5' 6\" (1.676 m)   Wt 159 lb (72.1 kg)   BMI 25.66 kg/m²   Objective:   Physical Exam  Vitals signs and nursing note reviewed. Constitutional:       Appearance: She is well-developed. HENT:      Head: Normocephalic and atraumatic. Right Ear: External ear normal.      Left Ear: External ear normal.      Nose: Nose normal.   Eyes:      General: No scleral icterus. Conjunctiva/sclera: Conjunctivae normal.      Pupils: Pupils are equal, round, and reactive to light. Neck:      Musculoskeletal: Normal range of motion and neck supple. Thyroid: No thyromegaly. Vascular: No JVD. Cardiovascular:      Rate and Rhythm: Normal rate and regular rhythm. Heart sounds: Normal heart sounds. Pulmonary:      Effort: Pulmonary effort is normal.      Breath sounds: Normal breath sounds. No wheezing or rales. Abdominal:      General: Bowel sounds are normal. There is no distension. Palpations: Abdomen is soft. There is no mass. Tenderness: There is no abdominal tenderness. Musculoskeletal: Normal range of motion. General: No tenderness. Lymphadenopathy:      Cervical: No cervical adenopathy. Skin:     General: Skin is warm and dry. Findings: No rash. Neurological:      Mental Status: She is alert and oriented to person, place, and time. Cranial Nerves: No cranial nerve deficit. Deep Tendon Reflexes: Reflexes are normal and symmetric. Assessment:       Diagnosis Orders   1. Essential hypertension  furosemide (LASIX) 20 MG tablet   2. Gastroesophageal reflux disease without esophagitis     3. Degenerative joint disease of cervical and lumbar spine     4. Chronic pain of both knees     5. Arthritis     6. Peripheral edema  furosemide (LASIX) 20 MG tablet         Plan:      Current Outpatient Medications   Medication Sig Dispense Refill    traMADol (ULTRAM) 50 MG tablet TAKE 2 TABLETS BY MOUTH 3 TIMES DAILY FOR 30 DAYS. REDUCE DOSE AS PAIN BECOMES MANAGEABLE. 180 tablet 0    predniSONE (DELTASONE) 20 MG tablet One tab po tid for 3 days and then one tab po bid for 5 days and then one a day for 5 days 24 tablet 0    furosemide (LASIX) 20 MG tablet TAKE 1 TABLET BY MOUTH DAILY AS NEEDED (LEG EDEMA) 60 tablet 1    ipratropium (ATROVENT) 0.06 % nasal spray INSTILL 2 SPRAYS BY NASAL ROUTE 2 TIMES DAILY 1 Bottle 5    oxaprozin (DAYPRO) 600 MG tablet TAKE 1 TABLET BY MOUTH TWICE A DAY 60 tablet 5    esomeprazole (NEXIUM) 40 MG delayed release capsule TAKE 1 CAPSULE BY MOUTH 2 TIMES DAILY. 60 capsule 5    amLODIPine (NORVASC) 2.5 MG tablet Take 1 tablet by mouth daily 30 tablet 2    potassium chloride (KLOR-CON M10) 10 MEQ extended release tablet Take 1 tablet by mouth daily 30 tablet 5    hydrochlorothiazide (HYDRODIURIL) 25 MG tablet Take 1 tablet by mouth every other day 45 tablet 3    valsartan (DIOVAN) 40 MG tablet Take 1 tablet by mouth daily 30 tablet 3    diclofenac sodium 1 % GEL Apply 2 g topically 4 times daily 1 Tube 5    ferrous sulfate 325 (65 Fe) MG tablet Take 325 mg by mouth every other day       metaxalone (SKELAXIN) 800 MG tablet TAKE 1 TABLET BY MOUTH 3 TIMES DAILY (Patient taking differently: 3 times daily as needed TAKE 1 TABLET BY MOUTH 3 TIMES DAILY) 90 tablet 3    nitroGLYCERIN (NITROLINGUAL) 0.4 MG/SPRAY spray Place 1 spray under the tongue every 5 minutes as needed for Chest pain.  1 Bottle 1   

## 2020-09-24 NOTE — TELEPHONE ENCOUNTER
Date of last visit:  9/23/2020  Date of next visit:  12/23/2020    Requested Prescriptions     Pending Prescriptions Disp Refills    gabapentin (NEURONTIN) 300 MG capsule [Pharmacy Med Name: GABAPENTIN 300 MG CAPSULE] 60 capsule 5     Sig: TAKE 1 CAPSULE BY MOUTH 2 TIMES DAILY

## 2020-09-25 RX ORDER — GABAPENTIN 300 MG/1
CAPSULE ORAL
Qty: 60 CAPSULE | Refills: 5 | Status: SHIPPED | OUTPATIENT
Start: 2020-09-25 | End: 2022-01-05

## 2020-10-15 RX ORDER — TRAMADOL HYDROCHLORIDE 50 MG/1
100 TABLET ORAL 3 TIMES DAILY
Qty: 180 TABLET | Refills: 0 | Status: SHIPPED | OUTPATIENT
Start: 2020-10-15 | End: 2020-11-17 | Stop reason: SDUPTHER

## 2020-10-15 NOTE — TELEPHONE ENCOUNTER
Date of last visit:  9/23/2020  Date of next visit:  12/23/2020    Requested Prescriptions     Pending Prescriptions Disp Refills    traMADol (ULTRAM) 50 MG tablet 180 tablet 0

## 2020-10-30 RX ORDER — ESOMEPRAZOLE MAGNESIUM 40 MG/1
CAPSULE, DELAYED RELEASE ORAL
Qty: 60 CAPSULE | Refills: 5 | Status: SHIPPED | OUTPATIENT
Start: 2020-10-30 | End: 2020-12-09 | Stop reason: SDUPTHER

## 2020-10-30 NOTE — TELEPHONE ENCOUNTER
Date of last visit:  9/23/2020  Date of next visit:  12/23/2020    Requested Prescriptions     Signed Prescriptions Disp Refills    esomeprazole (NEXIUM) 40 MG delayed release capsule 60 capsule 5     Sig: TAKE 1 CAPSULE BY MOUTH 2 TIMES DAILY.      Authorizing Provider: Jose Olivas     Ordering User: Kelsey Gaston

## 2020-11-16 NOTE — TELEPHONE ENCOUNTER
Date of last visit:  9/23/2020  Date of next visit:  12/23/2020    Requested Prescriptions     Pending Prescriptions Disp Refills    predniSONE (DELTASONE) 20 MG tablet 24 tablet 0     Sig: One tab po tid for 3 days and then one tab po bid for 5 days and then one a day for 5 days    traMADol (ULTRAM) 50 MG tablet 180 tablet 0     Sig: Take 2 tablets by mouth three times daily for 30 days. Patient called and requested Tramadol and PDN for her Arthritis Pain to [facility] on [address].

## 2020-11-17 RX ORDER — PREDNISONE 20 MG/1
TABLET ORAL
Qty: 24 TABLET | Refills: 0 | Status: SHIPPED | OUTPATIENT
Start: 2020-11-17 | End: 2020-12-09 | Stop reason: ALTCHOICE

## 2020-11-17 RX ORDER — TRAMADOL HYDROCHLORIDE 50 MG/1
100 TABLET ORAL 3 TIMES DAILY
Qty: 180 TABLET | Refills: 0 | Status: SHIPPED | OUTPATIENT
Start: 2020-11-17 | End: 2020-12-21 | Stop reason: SDUPTHER

## 2020-12-09 ENCOUNTER — OFFICE VISIT (OUTPATIENT)
Dept: FAMILY MEDICINE CLINIC | Age: 65
End: 2020-12-09

## 2020-12-09 VITALS
HEART RATE: 80 BPM | RESPIRATION RATE: 16 BRPM | BODY MASS INDEX: 25.25 KG/M2 | HEIGHT: 66 IN | DIASTOLIC BLOOD PRESSURE: 84 MMHG | TEMPERATURE: 96.8 F | WEIGHT: 157.13 LBS | SYSTOLIC BLOOD PRESSURE: 148 MMHG

## 2020-12-09 PROCEDURE — 90471 IMMUNIZATION ADMIN: CPT | Performed by: FAMILY MEDICINE

## 2020-12-09 PROCEDURE — 99213 OFFICE O/P EST LOW 20 MIN: CPT | Performed by: FAMILY MEDICINE

## 2020-12-09 PROCEDURE — 90688 IIV4 VACCINE SPLT 0.5 ML IM: CPT | Performed by: FAMILY MEDICINE

## 2020-12-09 RX ORDER — ESOMEPRAZOLE MAGNESIUM 40 MG/1
CAPSULE, DELAYED RELEASE ORAL
Qty: 60 CAPSULE | Refills: 5 | Status: SHIPPED | OUTPATIENT
Start: 2020-12-09 | End: 2021-06-14 | Stop reason: SDUPTHER

## 2020-12-09 RX ORDER — IPRATROPIUM BROMIDE 42 UG/1
SPRAY, METERED NASAL
Qty: 1 BOTTLE | Refills: 5 | Status: SHIPPED | OUTPATIENT
Start: 2020-12-09 | End: 2021-03-04 | Stop reason: ALTCHOICE

## 2020-12-09 RX ORDER — HYDROCHLOROTHIAZIDE 25 MG/1
25 TABLET ORAL EVERY OTHER DAY
Qty: 45 TABLET | Refills: 1 | Status: SHIPPED | OUTPATIENT
Start: 2020-12-09 | End: 2021-02-25

## 2020-12-09 RX ORDER — FUROSEMIDE 20 MG/1
20 TABLET ORAL DAILY PRN
Qty: 60 TABLET | Refills: 1 | Status: SHIPPED | OUTPATIENT
Start: 2020-12-09 | End: 2020-12-22

## 2020-12-09 ASSESSMENT — ENCOUNTER SYMPTOMS
SORE THROAT: 0
ORTHOPNEA: 0
ABDOMINAL PAIN: 0
CONSTIPATION: 0
SHORTNESS OF BREATH: 0
CHEST TIGHTNESS: 0
HEARTBURN: 1
EYE PAIN: 0
WHEEZING: 0
NAUSEA: 0
COUGH: 0

## 2020-12-09 NOTE — PROGRESS NOTES
Immunizations Administered     Name Date Dose Route    Influenza, Quadv, IM, (6 mo and older Fluzone, Flulaval, Fluarix and 3 yrs and older Afluria) 12/9/2020 0.5 mL Intramuscular    Site: Deltoid- Left    Lot: [id number]    NDC: 98088-294-51

## 2020-12-09 NOTE — PROGRESS NOTES
Subjective:      Patient ID: Ayden Paniagau is a 60 y.o. female. At  Work   Internal  Medicine   Now      low  Back  Pain  Noted   daypro and   Tramadol   Noted  And  With  Back and knee pain      peripheral   Edema  Noted   Left  More than  Right         Left    Foot pain  Noted   Prednisone   With  Help      left  Knee pain  Noted        Back pain  Noted         Right   5th  Metacarpal  fx               Hypertension   This is a chronic problem. The current episode started more than 1 year ago. The problem has been resolved since onset. The problem is controlled. Pertinent negatives include no chest pain, headaches, neck pain, orthopnea, palpitations, peripheral edema or shortness of breath. The current treatment provides significant improvement. There are no compliance problems. Leg Pain    The incident occurred more than 1 week ago. There was no injury mechanism. The pain is present in the left toes, left ankle and left leg. Pertinent negatives include no numbness. The treatment provided mild relief. Gastroesophageal Reflux   She complains of heartburn. She reports no abdominal pain, no chest pain, no coughing, no early satiety, no nausea, no sore throat or no wheezing. in am  worse . This is a chronic problem. The current episode started more than 1 year ago. The problem occurs rarely. The problem has been resolved. The symptoms are aggravated by certain foods. Pertinent negatives include no anemia or fatigue. She has tried a PPI for the symptoms. The treatment provided moderate relief.      Past Medical History:   Diagnosis Date    Acid reflux     Arthritis     Bleeding tendency (HCC)     Cancer ([facility] [address] 1989    cervical    Gallbladder disease 2010    History of scarlet fever     Hyperlipidemia     Hypertension     Joint pain     Medication intolerance     VERY SENSITIVE TO MEDICATIONS    Medication intolerance     VERY SENSITIVE TO MEDICATIONS     Swelling      Past Surgical History: Procedure Laterality Date    BACK SURGERY      BACK SURGERY  01/11/2019    Dr Mehta Drain @ [facility]       L1  to  L5  with    rods     BLADDER REPAIR  2006    CARDIAC CATHETERIZATION  2009     [facility]  and  dr. Rosalva Teague TEST  07/30/2009    EF 55% C NO OBVIOUS ISCHEMIA    CARDIOVASCULAR STRESS TEST  09/02/2005    EF 66% C MILD APICAL STRESS INDUCED ISCHEMIA    CERVICAL FUSION  11-22-13    Dr. Janine Thompson @ [facility], 4-5, 5-6, 6-7     COLONOSCOPY  2018      normal  and good   10 year s   1 [address] Espinoza  tahbso    JOINT REPLACEMENT Right 06/13/2017    2nd stage revision     KNEE ARTHROPLASTY  2010    LT    KNEE ARTHROSCOPY  1993,[id number]    KNEE ARTHROSCOPY Right 8-1-14    Meniscus Tear By Dr Keon Ortega @ [facility]     KNEE ARTHROSCOPY Right 9-16-16 and 10-25-16    Dr Keon Ortega @ [address] Right 05/02/2017    Antibiotic Spacer Placement - Dr. Sagastume Alert  1-10-15    Dr Janine Thompson @ [address] Left 01/2010    analisa    TRANSTHORACIC ECHOCARDIOGRAM  07/30/2009    EF 50% MILD LT VENTRICULAR HYPERTROPHY,TRIVIAL MR AND TR, TRIVIAL AORTIC INSUFFICIENCY,DIASTOLIC DYSFUNCTION OF THE LT VENTRICLE    TRANSTHORACIC ECHOCARDIOGRAM  08/25/2005    EF 60% C TRACE MR AND MILD TR    UPPER GASTROINTESTINAL ENDOSCOPY  2000    [facility]     Social History     Socioeconomic History    Marital status:       Spouse name: Not on file    Number of children: Not on file    Years of education: Not on file    Highest education level: Not on file   Occupational History    Not on file   Social Needs    Financial resource strain: Not on file    Food insecurity     Worry: Not on file     Inability: Not on file    Transportation needs     Medical: Not on file     Non-medical: Not on file   Tobacco Use    Smoking status: Former Smoker     Packs/day: 1.00     Years: 3.00     Pack years: 3.00     Last attempt to quit: 11/12/1975     Years since quittin.1    Smokeless tobacco: Never Used   Substance and Sexual Activity    Alcohol use: No    Drug use: No    Sexual activity: Yes     Partners: Male   Lifestyle    Physical activity     Days per week: Not on file     Minutes per session: Not on file    Stress: Not on file   Relationships    Social connections     Talks on phone: Not on file     Gets together: Not on file     Attends [employer] service: Not on file     Active member of club or organization: Not on file     Attends meetings of clubs or organizations: Not on file     Relationship status: Not on file    Intimate partner violence     Fear of current or ex partner: Not on file     Emotionally abused: Not on file     Physically abused: Not on file     Forced sexual activity: Not on file   Other Topics Concern    Not on file   Social History Narrative    Not on file     Family History   Problem Relation Age of Onset    Heart Disease Mother     Hypertension Mother     Heart Disease Father     Hypertension Father     Stroke Father      Review of Systems   Constitutional: Negative for appetite change, fatigue and fever. HENT: Negative for congestion, ear pain, postnasal drip and sore throat. Eyes: Negative for pain and visual disturbance. Respiratory: Negative for cough, chest tightness, shortness of breath and wheezing. Cardiovascular: Negative for chest pain, palpitations, orthopnea and leg swelling. Gastrointestinal: Positive for heartburn. Negative for abdominal pain, constipation and nausea. Genitourinary: Negative for dysuria and frequency. Musculoskeletal: Negative for arthralgias, joint swelling, neck pain and neck stiffness. Left  Foot pain and   Flat    Feet and left  Knee  pain   Skin: Negative for rash. Neurological: Negative for dizziness, weakness, numbness and headaches. Hematological: Negative for adenopathy. Does not bruise/bleed easily.    Psychiatric/Behavioral: Negative for behavioral problems and sleep disturbance. The patient is not nervous/anxious. BP (!) 148/84 (Site: Right Upper Arm, Position: Sitting, Cuff Size: Medium Adult)   Pulse 80   Temp 96.8 °F (36 °C) (Oral)   Resp 16   Ht 5' 6\" (1.676 m)   Wt 157 lb 2 oz (71.3 kg)   BMI 25.36 kg/m²   Objective:   Physical Exam  Vitals signs and nursing note reviewed. Constitutional:       Appearance: She is well-developed. HENT:      Head: Normocephalic and atraumatic. Right Ear: External ear normal.      Left Ear: External ear normal.      Nose: Nose normal.   Eyes:      General: No scleral icterus. Conjunctiva/sclera: Conjunctivae normal.      Pupils: Pupils are equal, round, and reactive to light. Neck:      Musculoskeletal: Normal range of motion and neck supple. Thyroid: No thyromegaly. Vascular: No JVD. Cardiovascular:      Rate and Rhythm: Normal rate and regular rhythm. Heart sounds: Normal heart sounds. Pulmonary:      Effort: Pulmonary effort is normal.      Breath sounds: Normal breath sounds. No wheezing or rales. Abdominal:      General: Bowel sounds are normal. There is no distension. Palpations: Abdomen is soft. There is no mass. Tenderness: There is no abdominal tenderness. Musculoskeletal: Normal range of motion. General: No tenderness. Lymphadenopathy:      Cervical: No cervical adenopathy. Skin:     General: Skin is warm and dry. Findings: No rash. Neurological:      Mental Status: She is alert and oriented to person, place, and time. Cranial Nerves: No cranial nerve deficit. Deep Tendon Reflexes: Reflexes are normal and symmetric. Assessment:       Diagnosis Orders   1. Essential hypertension  furosemide (LASIX) 20 MG tablet    hydroCHLOROthiazide (HYDRODIURIL) 25 MG tablet   2. Need for influenza vaccination  INFLUENZA, QUADV, 0.5ML, 6 MO AND OLDER, IM, MDV, (FLUZONE QUADV)   3. Peripheral edema  furosemide (LASIX) 20 MG tablet   4. Degenerative joint disease of cervical and lumbar spine  oxaprozin (DAYPRO) 600 MG tablet   5. Gastroesophageal reflux disease without esophagitis  esomeprazole (NEXIUM) 40 MG delayed release capsule   6. Non-seasonal allergic rhinitis due to pollen  ipratropium (ATROVENT) 0.06 % nasal spray         Plan:      Current Outpatient Medications   Medication Sig Dispense Refill    furosemide (LASIX) 20 MG tablet Take 1 tablet by mouth daily as needed (leg  edema) 60 tablet 1    oxaprozin (DAYPRO) 600 MG tablet TAKE 1 TABLET BY MOUTH TWICE A DAY 60 tablet 5    esomeprazole (NEXIUM) 40 MG delayed release capsule TAKE 1 CAPSULE BY MOUTH 2 TIMES DAILY. 60 capsule 5    hydroCHLOROthiazide (HYDRODIURIL) 25 MG tablet Take 1 tablet by mouth every other day 45 tablet 1    ipratropium (ATROVENT) 0.06 % nasal spray INSTILL 2 SPRAYS BY NASAL ROUTE 2 TIMES DAILY 1 Bottle 5    traMADol (ULTRAM) 50 MG tablet Take 2 tablets by mouth three times daily for 30 days. 180 tablet 0    gabapentin (NEURONTIN) 300 MG capsule TAKE 1 CAPSULE BY MOUTH 2 TIMES DAILY 60 capsule 5    amLODIPine (NORVASC) 2.5 MG tablet Take 1 tablet by mouth daily 30 tablet 2    potassium chloride (KLOR-CON M10) 10 MEQ extended release tablet Take 1 tablet by mouth daily 30 tablet 5    valsartan (DIOVAN) 40 MG tablet Take 1 tablet by mouth daily 30 tablet 3    diclofenac sodium 1 % GEL Apply 2 g topically 4 times daily 1 Tube 5    ferrous sulfate 325 (65 Fe) MG tablet Take 325 mg by mouth every other day       metaxalone (SKELAXIN) 800 MG tablet TAKE 1 TABLET BY MOUTH 3 TIMES DAILY (Patient taking differently: 3 times daily as needed TAKE 1 TABLET BY MOUTH 3 TIMES DAILY) 90 tablet 3    nitroGLYCERIN (NITROLINGUAL) 0.4 MG/SPRAY spray Place 1 spray under the tongue every 5 minutes as needed for Chest pain. 1 Bottle 1    Cholecalciferol (VITAMIN D3) 3000 UNITS TABS Take 5,000 Units by mouth daily.       folic acid (FOLVITE) 1 MG tablet Take 1 mg by mouth daily.        estrogens, conjugated, (PREMARIN) 0.9 MG tablet Take 0.9 mg by mouth daily.  Ascorbic Acid (VITAMIN C) 500 MG tablet Take 1,000 mg by mouth daily. No current facility-administered medications for this visit. No results found for this visit on 12/09/20. Orders Placed This Encounter   Procedures    INFLUENZA, QUADV, 0.5ML, 6 MO AND OLDER, IM, MDV, (Janet Purpura)    TSH with Reflex     Standing Status:   Future     Standing Expiration Date:   12/9/2021    Lipid Panel     Standing Status:   Future     Standing Expiration Date:   12/9/2021     Order Specific Question:   Is Patient Fasting?/# of Hours     Answer:   YES 12 HOURS    Comprehensive Metabolic Panel     Standing Status:   Future     Standing Expiration Date:   12/9/2021    CBC Auto Differential     Standing Status:   Future     Standing Expiration Date:   12/9/2021    RADHA Screen with Reflex     Standing Status:   Future     Standing Expiration Date:   12/9/2021    C-Reactive Protein     Standing Status:   Future     Standing Expiration Date:   12/9/2021    Rheumatoid Factor     Standing Status:   Future     Standing Expiration Date:   12/9/2021    Uric Acid     Standing Status:   Future     Standing Expiration Date:   12/9/2021    Sedimentation Rate     Standing Status:   Future     Standing Expiration Date:   12/9/2021   No results found for this visit on 12/09/20. See in [facility] [place]  As  On  Tramadol   Saw  Rheumatology  In past   Nolan Merida MD

## 2020-12-12 ENCOUNTER — NURSE ONLY (OUTPATIENT)
Dept: LAB | Age: 65
End: 2020-12-12

## 2020-12-12 LAB
ALBUMIN SERPL-MCNC: 3.8 G/DL (ref 3.5–5.1)
ALP BLD-CCNC: 52 U/L (ref 38–126)
ALT SERPL-CCNC: 14 U/L (ref 11–66)
ANION GAP SERPL CALCULATED.3IONS-SCNC: 12 MEQ/L (ref 8–16)
AST SERPL-CCNC: 24 U/L (ref 5–40)
BASOPHILS # BLD: 0.5 %
BASOPHILS ABSOLUTE: 0 THOU/MM3 (ref 0–0.1)
BILIRUB SERPL-MCNC: 0.2 MG/DL (ref 0.3–1.2)
BUN BLDV-MCNC: 17 MG/DL (ref 7–22)
C-REACTIVE PROTEIN: 0.39 MG/DL (ref 0–1)
CALCIUM SERPL-MCNC: 9 MG/DL (ref 8.5–10.5)
CHLORIDE BLD-SCNC: 101 MEQ/L (ref 98–111)
CHOLESTEROL, TOTAL: 257 MG/DL (ref 100–199)
CO2: 28 MEQ/L (ref 23–33)
CREAT SERPL-MCNC: 0.6 MG/DL (ref 0.4–1.2)
EOSINOPHIL # BLD: 3.8 %
EOSINOPHILS ABSOLUTE: 0.2 THOU/MM3 (ref 0–0.4)
ERYTHROCYTE [DISTWIDTH] IN BLOOD BY AUTOMATED COUNT: 16 % (ref 11.5–14.5)
ERYTHROCYTE [DISTWIDTH] IN BLOOD BY AUTOMATED COUNT: 44.6 FL (ref 35–45)
GFR SERPL CREATININE-BSD FRML MDRD: > 90 ML/MIN/1.73M2
GLUCOSE BLD-MCNC: 91 MG/DL (ref 70–108)
HCT VFR BLD CALC: 31.9 % (ref 37–47)
HDLC SERPL-MCNC: 92 MG/DL
HEMOGLOBIN: 9.4 GM/DL (ref 12–16)
IMMATURE GRANS (ABS): 0.01 THOU/MM3 (ref 0–0.07)
IMMATURE GRANULOCYTES: 0.2 %
LDL CHOLESTEROL CALCULATED: 145 MG/DL
LYMPHOCYTES # BLD: 29.6 %
LYMPHOCYTES ABSOLUTE: 1.3 THOU/MM3 (ref 1–4.8)
MCH RBC QN AUTO: 22.9 PG (ref 26–33)
MCHC RBC AUTO-ENTMCNC: 29.5 GM/DL (ref 32.2–35.5)
MCV RBC AUTO: 77.6 FL (ref 81–99)
MONOCYTES # BLD: 13.2 %
MONOCYTES ABSOLUTE: 0.6 THOU/MM3 (ref 0.4–1.3)
NUCLEATED RED BLOOD CELLS: 0 /100 WBC
PLATELET # BLD: 352 THOU/MM3 (ref 130–400)
PMV BLD AUTO: 9.1 FL (ref 9.4–12.4)
POTASSIUM SERPL-SCNC: 3.1 MEQ/L (ref 3.5–5.2)
RBC # BLD: 4.11 MILL/MM3 (ref 4.2–5.4)
RHEUMATOID FACTOR: 11 IU/ML (ref 0–13)
SEDIMENTATION RATE, ERYTHROCYTE: 4 MM/HR (ref 0–20)
SEG NEUTROPHILS: 52.7 %
SEGMENTED NEUTROPHILS ABSOLUTE COUNT: 2.3 THOU/MM3 (ref 1.8–7.7)
SODIUM BLD-SCNC: 141 MEQ/L (ref 135–145)
TOTAL PROTEIN: 5.8 G/DL (ref 6.1–8)
TRIGL SERPL-MCNC: 102 MG/DL (ref 0–199)
TSH SERPL DL<=0.05 MIU/L-ACNC: 1.73 UIU/ML (ref 0.4–4.2)
URIC ACID: 1.8 MG/DL (ref 2.4–5.7)
WBC # BLD: 4.3 THOU/MM3 (ref 4.8–10.8)

## 2020-12-13 ENCOUNTER — TELEPHONE (OUTPATIENT)
Dept: FAMILY MEDICINE CLINIC | Age: 65
End: 2020-12-13

## 2020-12-13 RX ORDER — SUCRALFATE 1 G/1
1 TABLET ORAL 4 TIMES DAILY
Qty: 120 TABLET | Refills: 1 | Status: SHIPPED | OUTPATIENT
Start: 2020-12-13 | End: 2021-06-04

## 2020-12-14 ENCOUNTER — TELEPHONE (OUTPATIENT)
Dept: FAMILY MEDICINE CLINIC | Age: 65
End: 2020-12-14

## 2020-12-14 NOTE — TELEPHONE ENCOUNTER
MOM to return call to office. Have labs to get done, where to fax them too? Also faxed order for [facility], will be contacting pt, with appt day and time.

## 2020-12-14 NOTE — TELEPHONE ENCOUNTER
----- Message from Danisha Hi MD sent at 12/13/2020  8:30 AM EST -----  Call as potassium low so needs potassium. No rheumatoid.  Fatigue due to hgb 9.4 so should get iron studies and stop daypro and add carafate as sent in and see gi as saw Annabel Adamson in past and have her see someone else and suggest jero see telephone note

## 2020-12-14 NOTE — TELEPHONE ENCOUNTER
MOM to return call to office   Faxed referall to [facility] they will call pt with appt time and day, need to know where to fax labs to.

## 2020-12-15 NOTE — TELEPHONE ENCOUNTER
Make sure taking carafate qid and nexium bid and check with gi on earliest or latest appointment as call gi  And will go from there  .  Take tylenol 500 mg with the  Tramadol

## 2020-12-17 LAB — ANA SCREEN: NORMAL

## 2020-12-21 RX ORDER — VALSARTAN 40 MG/1
40 TABLET ORAL DAILY
Qty: 30 TABLET | Refills: 3 | Status: SHIPPED | OUTPATIENT
Start: 2020-12-21 | End: 2021-03-04 | Stop reason: ALTCHOICE

## 2020-12-21 RX ORDER — TRAMADOL HYDROCHLORIDE 50 MG/1
100 TABLET ORAL 3 TIMES DAILY
Qty: 180 TABLET | Refills: 0 | Status: SHIPPED | OUTPATIENT
Start: 2020-12-21 | End: 2021-01-22 | Stop reason: SDUPTHER

## 2020-12-21 NOTE — TELEPHONE ENCOUNTER
Date of last visit:  12/9/2020  Date of next visit:  12/20/2020    Requested Prescriptions     Pending Prescriptions Disp Refills    furosemide (LASIX) 20 MG tablet [Pharmacy Med Name: FUROSEMIDE 20 MG TABLET] 60 tablet 1     Sig: TAKE 1 TABLET BY MOUTH DAILY AS NEEDED (LEG EDEMA)    traMADol (ULTRAM) 50 MG tablet [Pharmacy Med Name: TRAMADOL HCL 50 MG TABLET] 180 tablet 0     Sig: TAKE 2 TABLETS BY MOUTH THREE TIMES DAILY FOR 30 DAYS.

## 2020-12-22 RX ORDER — FUROSEMIDE 20 MG/1
20 TABLET ORAL DAILY PRN
Qty: 60 TABLET | Refills: 1 | Status: SHIPPED | OUTPATIENT
Start: 2020-12-22 | End: 2021-06-14 | Stop reason: SDUPTHER

## 2020-12-22 RX ORDER — TRAMADOL HYDROCHLORIDE 50 MG/1
100 TABLET ORAL 3 TIMES DAILY
Qty: 180 TABLET | Refills: 0 | OUTPATIENT
Start: 2020-12-22 | End: 2021-01-21

## 2020-12-23 ENCOUNTER — OFFICE VISIT (OUTPATIENT)
Dept: FAMILY MEDICINE CLINIC | Age: 65
End: 2020-12-23

## 2020-12-23 VITALS
SYSTOLIC BLOOD PRESSURE: 136 MMHG | DIASTOLIC BLOOD PRESSURE: 84 MMHG | RESPIRATION RATE: 16 BRPM | WEIGHT: 159 LBS | HEART RATE: 80 BPM | HEIGHT: 66 IN | BODY MASS INDEX: 25.55 KG/M2 | TEMPERATURE: 95.8 F

## 2020-12-23 PROCEDURE — 99213 OFFICE O/P EST LOW 20 MIN: CPT | Performed by: FAMILY MEDICINE

## 2020-12-23 RX ORDER — CEPHALEXIN 500 MG/1
500 CAPSULE ORAL 3 TIMES DAILY
Qty: 30 CAPSULE | Refills: 0 | Status: SHIPPED | OUTPATIENT
Start: 2020-12-23 | End: 2021-03-04 | Stop reason: ALTCHOICE

## 2020-12-23 ASSESSMENT — ENCOUNTER SYMPTOMS
SORE THROAT: 0
NAUSEA: 0
EYE PAIN: 0
WHEEZING: 0
ABDOMINAL PAIN: 0
COUGH: 0
CHEST TIGHTNESS: 0
SHORTNESS OF BREATH: 0
CONSTIPATION: 0

## 2020-12-23 NOTE — LETTER
[facility] [id number]  Phone: [phone]  Fax: [phone]    Danisha Hi MD        December 23, 2020     Patient: Yina Castillo   YOB: 1955   Date of Visit: 12/23/2020       To Whom it May Concern:    Jb Ross was seen in my clinic on 12/23/2020. Please excuse any missed work from today. If you have any questions or concerns, please don't hesitate to call.     Sincerely,         Danisha Hi MD

## 2020-12-23 NOTE — PROGRESS NOTES
Subjective:      Patient ID: Benita Butler is a 60 y.o. female. Left    Foot  Pain    Noted     4  Th  Toe  With  Abrasion   With  Shoe        Anemia   Note          ? Gastritis  noted       Back pain noted           Hypertension  This is a chronic problem. The current episode started more than 1 year ago. The problem has been resolved since onset. The problem is controlled. Pertinent negatives include no chest pain, headaches, neck pain, palpitations or shortness of breath. The current treatment provides significant improvement. There are no compliance problems. Past Medical History:   Diagnosis Date    Acid reflux     Arthritis     Bleeding tendency (HCC)     Cancer ([facility] [address] 1989    cervical    Gallbladder disease 2010    History of scarlet fever     Hyperlipidemia     Hypertension     Joint pain     Medication intolerance     VERY SENSITIVE TO MEDICATIONS    Medication intolerance     VERY SENSITIVE TO MEDICATIONS     Swelling      Review of Systems   Constitutional: Negative for appetite change, fatigue and fever. HENT: Negative for congestion, ear pain, postnasal drip and sore throat. Eyes: Negative for pain and visual disturbance. Respiratory: Negative for cough, chest tightness, shortness of breath and wheezing. Cardiovascular: Negative for chest pain, palpitations and leg swelling. Gastrointestinal: Negative for abdominal pain, constipation and nausea. Genitourinary: Negative for dysuria and frequency. Musculoskeletal: Negative for arthralgias, joint swelling, neck pain and neck stiffness. Skin: Negative for rash. Neurological: Negative for dizziness, weakness, numbness and headaches. Hematological: Negative for adenopathy. Does not bruise/bleed easily. Psychiatric/Behavioral: Negative for behavioral problems and sleep disturbance. The patient is not nervous/anxious. /84 (Site: Right Upper Arm, Position: Sitting, Cuff Size: Medium Adult)   Pulse 80   Temp 95.8 °F (35.4 °C) (Oral)   Resp 16   Ht 5' 6\" (1.676 m)   Wt 159 lb (72.1 kg)   BMI 25.66 kg/m²    Physical Exam  Vitals signs and nursing note reviewed. Constitutional:       Appearance: She is well-developed. HENT:      Head: Normocephalic and atraumatic. Right Ear: External ear normal.      Left Ear: External ear normal.      Nose: Nose normal.   Eyes:      General: No scleral icterus. Conjunctiva/sclera: Conjunctivae normal.      Pupils: Pupils are equal, round, and reactive to light. Neck:      Musculoskeletal: Normal range of motion and neck supple. Thyroid: No thyromegaly. Vascular: No JVD. Cardiovascular:      Rate and Rhythm: Normal rate and regular rhythm. Heart sounds: Normal heart sounds. Pulmonary:      Effort: Pulmonary effort is normal.      Breath sounds: Normal breath sounds. No wheezing or rales. Abdominal:      General: Bowel sounds are normal. There is no distension. Palpations: Abdomen is soft. There is no mass. Tenderness: There is no abdominal tenderness. Musculoskeletal: Normal range of motion. General: No tenderness. Comments: Left  4th  Toe  With  ulceration  Of the  Tip  With  Swelling    Lymphadenopathy:      Cervical: No cervical adenopathy. Skin:     General: Skin is warm and dry. Findings: No rash. Neurological:      Mental Status: She is alert and oriented to person, place, and time. Cranial Nerves: No cranial nerve deficit. Deep Tendon Reflexes: Reflexes are normal and symmetric. Assessment:       Diagnosis Orders   1. Skin ulcer of toe of left foot, limited to breakdown of skin ([facility])  cephALEXin (KEFLEX) 500 MG capsule   2. Essential hypertension  CBC   3.  Anemia, unspecified type  CBC         Plan:      Current Outpatient Medications   Medication Sig Dispense Refill  cephALEXin (KEFLEX) 500 MG capsule Take 1 capsule by mouth 3 times daily 30 capsule 0    furosemide (LASIX) 20 MG tablet TAKE 1 TABLET BY MOUTH DAILY AS NEEDED (LEG EDEMA) 60 tablet 1    valsartan (DIOVAN) 40 MG tablet TAKE 1 TABLET BY MOUTH DAILY 30 tablet 3    diclofenac sodium (VOLTAREN) 1 % GEL APPLY 2 G TOPICALLY 4 TIMES DAILY 100 g 5    traMADol (ULTRAM) 50 MG tablet Take 2 tablets by mouth three times daily for 30 days. 180 tablet 0    sucralfate (CARAFATE) 1 GM tablet Take 1 tablet by mouth 4 times daily 120 tablet 1    esomeprazole (NEXIUM) 40 MG delayed release capsule TAKE 1 CAPSULE BY MOUTH 2 TIMES DAILY. 60 capsule 5    hydroCHLOROthiazide (HYDRODIURIL) 25 MG tablet Take 1 tablet by mouth every other day 45 tablet 1    ipratropium (ATROVENT) 0.06 % nasal spray INSTILL 2 SPRAYS BY NASAL ROUTE 2 TIMES DAILY 1 Bottle 5    gabapentin (NEURONTIN) 300 MG capsule TAKE 1 CAPSULE BY MOUTH 2 TIMES DAILY 60 capsule 5    amLODIPine (NORVASC) 2.5 MG tablet Take 1 tablet by mouth daily 30 tablet 2    potassium chloride (KLOR-CON M10) 10 MEQ extended release tablet Take 1 tablet by mouth daily 30 tablet 5    ferrous sulfate 325 (65 Fe) MG tablet Take 325 mg by mouth every other day       metaxalone (SKELAXIN) 800 MG tablet TAKE 1 TABLET BY MOUTH 3 TIMES DAILY (Patient taking differently: 3 times daily as needed TAKE 1 TABLET BY MOUTH 3 TIMES DAILY) 90 tablet 3    nitroGLYCERIN (NITROLINGUAL) 0.4 MG/SPRAY spray Place 1 spray under the tongue every 5 minutes as needed for Chest pain. 1 Bottle 1    Cholecalciferol (VITAMIN D3) 3000 UNITS TABS Take 5,000 Units by mouth daily.  folic acid (FOLVITE) 1 MG tablet Take 1 mg by mouth daily.  estrogens, conjugated, (PREMARIN) 0.9 MG tablet Take 0.9 mg by mouth daily.  Ascorbic Acid (VITAMIN C) 500 MG tablet Take 1,000 mg by mouth daily.  oxaprozin (DAYPRO) 600 MG tablet TAKE 1 TABLET BY MOUTH TWICE A DAY (Patient not taking: Reported on 12/23/2020) 60 tablet 5     No current facility-administered medications for this visit. Orders Placed This Encounter   Procedures    CBC     Standing Status:   Future     Standing Expiration Date:   12/23/2021     See in  3 mths  And  bp  Better       To see  Gi in  Time  As  Warned  Of ulcer and  Added in   carafate       Recheck lab  As  Refuse  To  stop  Daypro and  Understands  Risk     nexium bid   With  carafate .         Keflex  For  Toe and see podiatry        See in  3 mths            Eveline Santiago MD

## 2021-01-12 DIAGNOSIS — I10 ESSENTIAL HYPERTENSION: ICD-10-CM

## 2021-01-13 RX ORDER — POTASSIUM CHLORIDE 750 MG/1
TABLET, EXTENDED RELEASE ORAL
Qty: 30 TABLET | Refills: 5 | Status: SHIPPED | OUTPATIENT
Start: 2021-01-13 | End: 2021-03-10 | Stop reason: SDUPTHER

## 2021-01-21 DIAGNOSIS — M25.562 CHRONIC PAIN OF BOTH KNEES: ICD-10-CM

## 2021-01-21 DIAGNOSIS — M47.812 DEGENERATIVE JOINT DISEASE OF CERVICAL AND LUMBAR SPINE: Chronic | ICD-10-CM

## 2021-01-21 DIAGNOSIS — G89.29 CHRONIC PAIN OF BOTH KNEES: ICD-10-CM

## 2021-01-21 DIAGNOSIS — M25.561 CHRONIC PAIN OF BOTH KNEES: ICD-10-CM

## 2021-01-21 DIAGNOSIS — M47.816 DEGENERATIVE JOINT DISEASE OF CERVICAL AND LUMBAR SPINE: Chronic | ICD-10-CM

## 2021-01-21 NOTE — TELEPHONE ENCOUNTER
Date of last visit:  12/23/2020  Date of next visit:  3/10/2021    Requested Prescriptions     Pending Prescriptions Disp Refills    traMADol (ULTRAM) 50 MG tablet 180 tablet 0     Sig: Take 2 tablets by mouth three times daily for 30 days.

## 2021-01-22 ENCOUNTER — TELEPHONE (OUTPATIENT)
Dept: FAMILY MEDICINE CLINIC | Age: 66
End: 2021-01-22

## 2021-01-22 DIAGNOSIS — R11.0 NAUSEA: Primary | ICD-10-CM

## 2021-01-22 RX ORDER — TRAMADOL HYDROCHLORIDE 50 MG/1
100 TABLET ORAL 3 TIMES DAILY
Qty: 180 TABLET | Refills: 0 | Status: SHIPPED | OUTPATIENT
Start: 2021-01-22 | End: 2021-02-19 | Stop reason: SDUPTHER

## 2021-01-23 RX ORDER — ONDANSETRON 4 MG/1
4 TABLET, FILM COATED ORAL EVERY 6 HOURS PRN
Qty: 10 TABLET | Refills: 0 | Status: SHIPPED | OUTPATIENT
Start: 2021-01-23 | End: 2021-06-14 | Stop reason: ALTCHOICE

## 2021-01-28 ENCOUNTER — NURSE ONLY (OUTPATIENT)
Dept: LAB | Age: 66
End: 2021-01-28

## 2021-01-28 DIAGNOSIS — D64.9 ANEMIA, UNSPECIFIED TYPE: ICD-10-CM

## 2021-01-28 DIAGNOSIS — I10 ESSENTIAL HYPERTENSION: ICD-10-CM

## 2021-01-28 LAB
ERYTHROCYTE [DISTWIDTH] IN BLOOD BY AUTOMATED COUNT: 21.4 % (ref 11.5–14.5)
ERYTHROCYTE [DISTWIDTH] IN BLOOD BY AUTOMATED COUNT: 59.7 FL (ref 35–45)
FERRITIN: 21 NG/ML (ref 10–291)
HCT VFR BLD CALC: 34.6 % (ref 37–47)
HEMOGLOBIN: 10.3 GM/DL (ref 12–16)
IRON: 25 UG/DL (ref 50–170)
MCH RBC QN AUTO: 23.8 PG (ref 26–33)
MCHC RBC AUTO-ENTMCNC: 29.8 GM/DL (ref 32.2–35.5)
MCV RBC AUTO: 79.9 FL (ref 81–99)
PLATELET # BLD: 294 THOU/MM3 (ref 130–400)
PMV BLD AUTO: 9.3 FL (ref 9.4–12.4)
RBC # BLD: 4.33 MILL/MM3 (ref 4.2–5.4)
TOTAL IRON BINDING CAPACITY: 512 UG/DL (ref 171–450)
WBC # BLD: 5.2 THOU/MM3 (ref 4.8–10.8)

## 2021-01-29 ENCOUNTER — TELEPHONE (OUTPATIENT)
Dept: FAMILY MEDICINE CLINIC | Age: 66
End: 2021-01-29

## 2021-01-29 NOTE — TELEPHONE ENCOUNTER
----- Message from Narciso Damon MD sent at 1/29/2021  5:57 AM EST -----  hgb better  Gut  Iron studies are low so stay on irona nd had colonoscopy but not seeing egd ? ?  Would suggest egd

## 2021-01-30 LAB — SOLUBLE TRANSFERRIN RECEPT: 8.3 MG/L (ref 1.9–4.4)

## 2021-02-01 ENCOUNTER — TELEPHONE (OUTPATIENT)
Dept: FAMILY MEDICINE CLINIC | Age: 66
End: 2021-02-01

## 2021-02-01 NOTE — TELEPHONE ENCOUNTER
----- Message from Maria Dolores Devi MD sent at 1/31/2021  7:33 AM EST -----  Soluble transferrin receptor clear with blood loss as number high and was before so really feel need to see gi for egd.   Colonoscopy was ok 2018

## 2021-02-02 NOTE — TELEPHONE ENCOUNTER
Corinne informed by Phone. She will think about seeing GI however with her work schedule and just starting a new job she is unsure if she wants to do that now or wait.

## 2021-02-05 ENCOUNTER — TELEPHONE (OUTPATIENT)
Dept: FAMILY MEDICINE CLINIC | Age: 66
End: 2021-02-05

## 2021-02-18 DIAGNOSIS — M25.562 CHRONIC PAIN OF BOTH KNEES: ICD-10-CM

## 2021-02-18 DIAGNOSIS — G89.29 CHRONIC PAIN OF BOTH KNEES: ICD-10-CM

## 2021-02-18 DIAGNOSIS — M25.561 CHRONIC PAIN OF BOTH KNEES: ICD-10-CM

## 2021-02-18 DIAGNOSIS — M47.816 DEGENERATIVE JOINT DISEASE OF CERVICAL AND LUMBAR SPINE: Chronic | ICD-10-CM

## 2021-02-18 DIAGNOSIS — M47.812 DEGENERATIVE JOINT DISEASE OF CERVICAL AND LUMBAR SPINE: Chronic | ICD-10-CM

## 2021-02-19 RX ORDER — TRAMADOL HYDROCHLORIDE 50 MG/1
100 TABLET ORAL 3 TIMES DAILY
Qty: 180 TABLET | Refills: 0 | Status: SHIPPED | OUTPATIENT
Start: 2021-02-19 | End: 2021-03-19 | Stop reason: SDUPTHER

## 2021-02-25 DIAGNOSIS — I10 ESSENTIAL HYPERTENSION: ICD-10-CM

## 2021-02-25 RX ORDER — HYDROCHLOROTHIAZIDE 25 MG/1
25 TABLET ORAL EVERY OTHER DAY
Qty: 45 TABLET | Refills: 3 | Status: SHIPPED | OUTPATIENT
Start: 2021-02-25 | End: 2022-03-07 | Stop reason: SDUPTHER

## 2021-03-02 ENCOUNTER — TELEPHONE (OUTPATIENT)
Dept: FAMILY MEDICINE CLINIC | Age: 66
End: 2021-03-02

## 2021-03-04 ENCOUNTER — OFFICE VISIT (OUTPATIENT)
Dept: CARDIOLOGY CLINIC | Age: 66
End: 2021-03-04
Payer: COMMERCIAL

## 2021-03-04 VITALS
BODY MASS INDEX: 25.58 KG/M2 | HEIGHT: 66 IN | DIASTOLIC BLOOD PRESSURE: 80 MMHG | HEART RATE: 73 BPM | WEIGHT: 159.2 LBS | SYSTOLIC BLOOD PRESSURE: 160 MMHG

## 2021-03-04 DIAGNOSIS — E78.01 FAMILIAL HYPERCHOLESTEROLEMIA: ICD-10-CM

## 2021-03-04 DIAGNOSIS — I10 ESSENTIAL HYPERTENSION: Primary | ICD-10-CM

## 2021-03-04 PROCEDURE — 93000 ELECTROCARDIOGRAM COMPLETE: CPT | Performed by: NUCLEAR MEDICINE

## 2021-03-04 PROCEDURE — 99213 OFFICE O/P EST LOW 20 MIN: CPT | Performed by: NUCLEAR MEDICINE

## 2021-03-04 RX ORDER — ZINC GLUCONATE 50 MG
50 TABLET ORAL DAILY
COMMUNITY

## 2021-03-04 NOTE — PROGRESS NOTES
[id number] [facility] .  [address] [place] [id number]  Dept: [phone]  Dept Fax: [phone]  Loc: [phone]    Visit Date: 3/4/2021    Toan Ledesma is a 60 y.o. female who presents todayfor:  Chief Complaint   Patient presents with    1 Year Follow Up    Hypertension    Hyperlipidemia   does have HTN   So so controlled  No chest pain   No changes in breathing  Some anemia   Supposed to see GI   No obvious clear cut GI bleeding   Issues with the statins         HPI:  HPI  Past Medical History:   Diagnosis Date    Acid reflux     Arthritis     Bleeding tendency ([address]     Cancer ([facility] [address] 1989    cervical    Gallbladder disease 2010    History of scarlet fever     Hyperlipidemia     Hypertension     Joint pain     Medication intolerance     VERY SENSITIVE TO MEDICATIONS    Medication intolerance     VERY SENSITIVE TO MEDICATIONS     Swelling       Past Surgical History:   Procedure Laterality Date    BACK SURGERY      BACK SURGERY  01/11/2019    Dr Chloe Camarillo @ [facility]       L1  to  L5  with    rods     BLADDER REPAIR  2006    CARDIAC CATHETERIZATION  2009     [facility]  and  dr. Dung Degroot TEST  07/30/2009    EF 55% C NO OBVIOUS ISCHEMIA    CARDIOVASCULAR STRESS TEST  09/02/2005    EF 66% C MILD APICAL STRESS INDUCED ISCHEMIA    CERVICAL FUSION  11-22-13    Dr. Chloe Camarillo @ [facility], 4-5, 5-6, 6-7     COLONOSCOPY  2018      normal  and good   10 year s   1 Chano Espinoza  tahbso    JOINT REPLACEMENT Right 06/13/2017    2nd stage revision     KNEE ARTHROPLASTY  2010    LT    KNEE ARTHROSCOPY  [id number]    KNEE ARTHROSCOPY Right 8-1-14    Meniscus Tear By Dr Filippo Mckeon @ [facility]     KNEE ARTHROSCOPY Right 9-16-16 and 10-25-16    Dr Filippo Mckeon @ [address] Right 05/02/2017    Antibiotic Spacer Placement - Dr. Kelsey Quintana  1-10-15    Dr Chloe Camarillo @ [place] [id number]  gabapentin (NEURONTIN) 300 MG capsule TAKE 1 CAPSULE BY MOUTH 2 TIMES DAILY 60 capsule 5    ferrous sulfate 325 (65 Fe) MG tablet Take 325 mg by mouth every other day       metaxalone (SKELAXIN) 800 MG tablet TAKE 1 TABLET BY MOUTH 3 TIMES DAILY (Patient taking differently: 3 times daily as needed TAKE 1 TABLET BY MOUTH 3 TIMES DAILY) 90 tablet 3    nitroGLYCERIN (NITROLINGUAL) 0.4 MG/SPRAY spray Place 1 spray under the tongue every 5 minutes as needed for Chest pain. 1 Bottle 1    Cholecalciferol (VITAMIN D3) 3000 UNITS TABS Take 5,000 Units by mouth daily.  folic acid (FOLVITE) 1 MG tablet Take 1 mg by mouth daily.  estrogens, conjugated, (PREMARIN) 0.9 MG tablet Take 0.9 mg by mouth daily.  Ascorbic Acid (VITAMIN C) 500 MG tablet Take 1,000 mg by mouth daily. No current facility-administered medications for this visit.       Allergies   Allergen Reactions    Aldactone [Spironolactone] Other (See Comments)     CAUSES HEADACHES    Amlodipine Besy-Benazepril Hcl Other (See Comments)     CAUSES JOINT PAIN    Amlodipine-Atorvastatin Other (See Comments)     CAUSES INCREASED CRP     Bactrim     Cardizem [Diltiazem Hcl] Other (See Comments)     CAUSES LETHARGY AND LIGHT HEADED    Feldene [Piroxicam]     Motrin [Ibuprofen Micronized]     Naproxen     Potassium-Containing Compounds Other (See Comments)     CAUSES ACID REFLUX    Statins [Statins]      muscle    Vioxx     Zetia [Ezetimibe] Nausea Only and Other (See Comments)     CAUSES REFLUX    Macrobid [Nitrofurantoin] Nausea And Vomiting    Plaquenil [Hydroxychloroquine Sulfate] Rash     Health Maintenance   Topic Date Due    DTaP/Tdap/Td vaccine (1 - Tdap) Never done    Shingles Vaccine (1 of 2) Never done    Cervical cancer screen  12/01/2016    Potassium monitoring  12/12/2021    Creatinine monitoring  12/12/2021    Breast cancer screen  03/01/2022    Lipid screen  12/12/2025  Colon cancer screen colonoscopy  11/06/2028    Flu vaccine  Completed    COVID-19 Vaccine  Completed    Hepatitis C screen  Completed    HIV screen  Completed    Hepatitis A vaccine  Aged Out    Hepatitis B vaccine  Aged Out    Hib vaccine  Aged Out    Meningococcal (ACWY) vaccine  Aged Out    Pneumococcal 0-64 years Vaccine  Aged Out       Subjective:  Review of Systems  General:   No fever, no chills, some fatigue or weight loss  Pulmonary:    some dyspnea, no wheezing  Cardiac:    Denies recent chest pain,   GI:     No nausea or vomiting, no abdominal pain  Neuro:     No dizziness or light headedness,   Musculoskeletal:  No recent active issues  Extremities:   No edema, no obvious claudication       Objective:  Physical Exam  BP (!) 160/80   Pulse 73   Ht 5' 6\" (1.676 m)   Wt 159 lb 3.2 oz (72.2 kg)   BMI 25.70 kg/m²   General:   Well developed, well nourished  Lungs:    Clear to auscultation  Heart:    Normal S1 S2, Slight murmur. no rubs, no gallops  Abdomen:   Soft, non tender, no organomegalies, positive bowel sounds  Extremities:   No edema, no cyanosis, good peripheral pulses  Neurological:   Awake, alert, oriented. No obvious focal deficits  Musculoskelatal:  No obvious deformities    Assessment:      Diagnosis Orders   1. Essential hypertension  EKG 12 Lead   2. Familial hypercholesterolemia     as above  Higher risk patient   Seems fair for now  ECG in office was done today. I reviewed the ECG. No acute findings    Plan:  No follow-ups on file. As above  Monitor the BP  Consider GI evaluation   Continue risk factor modification and medical management. Thank you for allowing me to participate in the care of your patient. Please don't hesitate to contact me regarding any further issues related to the patient care      Orders Placed:  Orders Placed This Encounter   Procedures    EKG 12 Lead     Order Specific Question:   Reason for Exam?     Answer:    Other       Medications Prescribed: No orders of the defined types were placed in this encounter. Discussed use, benefit, and side effects of prescribed medications. All patient questions answered. Pt voicedunderstanding. Instructed to continue current medications, diet and exercise. Continue risk factor modification and medical management. Patient agreed with treatment plan. Follow up as directed.     Electronically signedby Alfonso Skaggs MD on 3/4/2021 at 7:41 AM

## 2021-03-04 NOTE — PROGRESS NOTES
1 year follow-up. She denies having any chest pain, shortness of breath, dizziness, lightheadedness, palpitations or SUSAN. EKG completed. She states she may have an upcoming surgery on her foot but nothing scheduled.

## 2021-03-10 ENCOUNTER — OFFICE VISIT (OUTPATIENT)
Dept: FAMILY MEDICINE CLINIC | Age: 66
End: 2021-03-10

## 2021-03-10 VITALS
SYSTOLIC BLOOD PRESSURE: 170 MMHG | BODY MASS INDEX: 25.41 KG/M2 | WEIGHT: 158.13 LBS | DIASTOLIC BLOOD PRESSURE: 98 MMHG | HEART RATE: 76 BPM | HEIGHT: 66 IN | TEMPERATURE: 96.7 F | RESPIRATION RATE: 16 BRPM

## 2021-03-10 DIAGNOSIS — K21.9 GASTROESOPHAGEAL REFLUX DISEASE WITHOUT ESOPHAGITIS: ICD-10-CM

## 2021-03-10 DIAGNOSIS — G89.29 CHRONIC PAIN OF BOTH KNEES: ICD-10-CM

## 2021-03-10 DIAGNOSIS — I73.00 RAYNAUD'S PHENOMENON WITHOUT GANGRENE: ICD-10-CM

## 2021-03-10 DIAGNOSIS — D50.8 OTHER IRON DEFICIENCY ANEMIA: ICD-10-CM

## 2021-03-10 DIAGNOSIS — M47.812 DEGENERATIVE JOINT DISEASE OF CERVICAL AND LUMBAR SPINE: Chronic | ICD-10-CM

## 2021-03-10 DIAGNOSIS — I10 ESSENTIAL HYPERTENSION: Primary | ICD-10-CM

## 2021-03-10 DIAGNOSIS — M25.562 CHRONIC PAIN OF BOTH KNEES: ICD-10-CM

## 2021-03-10 DIAGNOSIS — M25.561 CHRONIC PAIN OF BOTH KNEES: ICD-10-CM

## 2021-03-10 DIAGNOSIS — M47.816 DEGENERATIVE JOINT DISEASE OF CERVICAL AND LUMBAR SPINE: Chronic | ICD-10-CM

## 2021-03-10 PROCEDURE — 99213 OFFICE O/P EST LOW 20 MIN: CPT | Performed by: FAMILY MEDICINE

## 2021-03-10 RX ORDER — PREDNISONE 20 MG/1
TABLET ORAL
Qty: 24 TABLET | Refills: 0 | Status: SHIPPED | OUTPATIENT
Start: 2021-03-10 | End: 2021-06-14 | Stop reason: ALTCHOICE

## 2021-03-10 RX ORDER — POTASSIUM CHLORIDE 750 MG/1
TABLET, EXTENDED RELEASE ORAL
Qty: 30 TABLET | Refills: 5 | Status: SHIPPED | OUTPATIENT
Start: 2021-03-10 | End: 2022-01-04

## 2021-03-10 ASSESSMENT — ENCOUNTER SYMPTOMS
CONSTIPATION: 0
COUGH: 0
CHEST TIGHTNESS: 0
SORE THROAT: 0
ORTHOPNEA: 0
WHEEZING: 0
SHORTNESS OF BREATH: 0
ABDOMINAL PAIN: 0
EYE PAIN: 0
NAUSEA: 0

## 2021-03-10 ASSESSMENT — PATIENT HEALTH QUESTIONNAIRE - PHQ9
2. FEELING DOWN, DEPRESSED OR HOPELESS: 2
SUM OF ALL RESPONSES TO PHQ9 QUESTIONS 1 & 2: 2

## 2021-03-18 DIAGNOSIS — M47.816 DEGENERATIVE JOINT DISEASE OF CERVICAL AND LUMBAR SPINE: Chronic | ICD-10-CM

## 2021-03-18 DIAGNOSIS — G89.29 CHRONIC PAIN OF BOTH KNEES: ICD-10-CM

## 2021-03-18 DIAGNOSIS — M25.562 CHRONIC PAIN OF BOTH KNEES: ICD-10-CM

## 2021-03-18 DIAGNOSIS — M25.561 CHRONIC PAIN OF BOTH KNEES: ICD-10-CM

## 2021-03-18 DIAGNOSIS — M47.812 DEGENERATIVE JOINT DISEASE OF CERVICAL AND LUMBAR SPINE: Chronic | ICD-10-CM

## 2021-03-18 NOTE — TELEPHONE ENCOUNTER
Date of last visit:  3/10/2021  Date of next visit:  6/14/2021    Requested Prescriptions     Pending Prescriptions Disp Refills    traMADol (ULTRAM) 50 MG tablet 180 tablet 0     Sig: Take 2 tablets by mouth three times daily for 30 days.

## 2021-03-19 RX ORDER — TRAMADOL HYDROCHLORIDE 50 MG/1
100 TABLET ORAL 3 TIMES DAILY
Qty: 180 TABLET | Refills: 0 | Status: SHIPPED | OUTPATIENT
Start: 2021-03-19 | End: 2021-04-20 | Stop reason: SDUPTHER

## 2021-04-07 ENCOUNTER — TELEPHONE (OUTPATIENT)
Dept: FAMILY MEDICINE CLINIC | Age: 66
End: 2021-04-07

## 2021-04-07 RX ORDER — FLUCONAZOLE 150 MG/1
TABLET ORAL
Qty: 2 TABLET | Refills: 1 | Status: SHIPPED | OUTPATIENT
Start: 2021-04-07 | End: 2021-06-14 | Stop reason: ALTCHOICE

## 2021-04-07 NOTE — TELEPHONE ENCOUNTER
Date of last visit:  3/10/2021  Date of next visit:  6/14/2021    Requested Prescriptions     Signed Prescriptions Disp Refills    fluconazole (DIFLUCAN) 150 MG tablet 2 tablet 1     Sig: Take 1 tablet now, and repeat in 2-3 days     Authorizing Provider: Estella Neff     Ordering User: FABBY KINCAID       Patient called with complaints of Vaginitis- requested Diflucan to [facility]

## 2021-04-19 DIAGNOSIS — M47.816 DEGENERATIVE JOINT DISEASE OF CERVICAL AND LUMBAR SPINE: Chronic | ICD-10-CM

## 2021-04-19 DIAGNOSIS — M47.812 DEGENERATIVE JOINT DISEASE OF CERVICAL AND LUMBAR SPINE: Chronic | ICD-10-CM

## 2021-04-19 DIAGNOSIS — M25.561 CHRONIC PAIN OF BOTH KNEES: ICD-10-CM

## 2021-04-19 DIAGNOSIS — M25.562 CHRONIC PAIN OF BOTH KNEES: ICD-10-CM

## 2021-04-19 DIAGNOSIS — G89.29 CHRONIC PAIN OF BOTH KNEES: ICD-10-CM

## 2021-04-20 RX ORDER — TRAMADOL HYDROCHLORIDE 50 MG/1
100 TABLET ORAL 3 TIMES DAILY
Qty: 180 TABLET | Refills: 0 | Status: SHIPPED | OUTPATIENT
Start: 2021-04-20 | End: 2021-05-20 | Stop reason: SDUPTHER

## 2021-05-10 ENCOUNTER — NURSE ONLY (OUTPATIENT)
Dept: LAB | Age: 66
End: 2021-05-10

## 2021-05-10 ENCOUNTER — OFFICE VISIT (OUTPATIENT)
Dept: FAMILY MEDICINE CLINIC | Age: 66
End: 2021-05-10

## 2021-05-10 ENCOUNTER — TELEPHONE (OUTPATIENT)
Dept: FAMILY MEDICINE CLINIC | Age: 66
End: 2021-05-10

## 2021-05-10 VITALS
HEART RATE: 76 BPM | BODY MASS INDEX: 25.61 KG/M2 | HEIGHT: 66 IN | TEMPERATURE: 97.1 F | WEIGHT: 159.38 LBS | SYSTOLIC BLOOD PRESSURE: 128 MMHG | RESPIRATION RATE: 16 BRPM | DIASTOLIC BLOOD PRESSURE: 72 MMHG

## 2021-05-10 DIAGNOSIS — R10.13 EPIGASTRIC PAIN: ICD-10-CM

## 2021-05-10 DIAGNOSIS — I10 ESSENTIAL HYPERTENSION: ICD-10-CM

## 2021-05-10 DIAGNOSIS — K21.9 GASTROESOPHAGEAL REFLUX DISEASE WITHOUT ESOPHAGITIS: Primary | ICD-10-CM

## 2021-05-10 DIAGNOSIS — K21.9 GASTROESOPHAGEAL REFLUX DISEASE WITHOUT ESOPHAGITIS: ICD-10-CM

## 2021-05-10 LAB
ALBUMIN SERPL-MCNC: 3.7 G/DL (ref 3.5–5.1)
ALP BLD-CCNC: 55 U/L (ref 38–126)
ALT SERPL-CCNC: 23 U/L (ref 11–66)
AST SERPL-CCNC: 41 U/L (ref 5–40)
BASOPHILS # BLD: 0.7 %
BASOPHILS ABSOLUTE: 0 THOU/MM3 (ref 0–0.1)
BILIRUB SERPL-MCNC: 0.2 MG/DL (ref 0.3–1.2)
BILIRUBIN DIRECT: < 0.2 MG/DL (ref 0–0.3)
EOSINOPHIL # BLD: 1.4 %
EOSINOPHILS ABSOLUTE: 0.1 THOU/MM3 (ref 0–0.4)
ERYTHROCYTE [DISTWIDTH] IN BLOOD BY AUTOMATED COUNT: 14.6 % (ref 11.5–14.5)
ERYTHROCYTE [DISTWIDTH] IN BLOOD BY AUTOMATED COUNT: 47 FL (ref 35–45)
HCT VFR BLD CALC: 38.4 % (ref 37–47)
HEMOGLOBIN: 12.1 GM/DL (ref 12–16)
HGB, POC: 13
IMMATURE GRANS (ABS): 0.03 THOU/MM3 (ref 0–0.07)
IMMATURE GRANULOCYTES: 0.5 %
LYMPHOCYTES # BLD: 26.6 %
LYMPHOCYTES ABSOLUTE: 1.5 THOU/MM3 (ref 1–4.8)
MCH RBC QN AUTO: 27.9 PG (ref 26–33)
MCHC RBC AUTO-ENTMCNC: 31.5 GM/DL (ref 32.2–35.5)
MCV RBC AUTO: 88.7 FL (ref 81–99)
MONOCYTES # BLD: 12.4 %
MONOCYTES ABSOLUTE: 0.7 THOU/MM3 (ref 0.4–1.3)
NUCLEATED RED BLOOD CELLS: 0 /100 WBC
PLATELET # BLD: 254 THOU/MM3 (ref 130–400)
PMV BLD AUTO: 9.2 FL (ref 9.4–12.4)
RBC # BLD: 4.33 MILL/MM3 (ref 4.2–5.4)
SEG NEUTROPHILS: 58.4 %
SEGMENTED NEUTROPHILS ABSOLUTE COUNT: 3.4 THOU/MM3 (ref 1.8–7.7)
TOTAL PROTEIN: 5.9 G/DL (ref 6.1–8)
WBC # BLD: 5.8 THOU/MM3 (ref 4.8–10.8)

## 2021-05-10 PROCEDURE — 85018 HEMOGLOBIN: CPT | Performed by: FAMILY MEDICINE

## 2021-05-10 PROCEDURE — 99213 OFFICE O/P EST LOW 20 MIN: CPT | Performed by: FAMILY MEDICINE

## 2021-05-10 ASSESSMENT — ENCOUNTER SYMPTOMS
SHORTNESS OF BREATH: 0
NAUSEA: 0
ABDOMINAL PAIN: 1
DIARRHEA: 0
BELCHING: 1
CHEST TIGHTNESS: 0
WHEEZING: 0
FLATUS: 0
SORE THROAT: 0
EYE PAIN: 0
CONSTIPATION: 0
VOMITING: 1
COUGH: 0

## 2021-05-10 NOTE — PROGRESS NOTES
Subjective:      Patient ID: Mack Meneses is a 60 y.o. female. Stress  With  Son and mother  Now  Cancer      pain  All the  Time   No  Weight loss     Abdominal Pain  This is a new problem. The current episode started in the past 7 days. The problem has been gradually worsening. The pain is located in the epigastric region. The quality of the pain is dull and aching. Associated symptoms include belching and vomiting. Pertinent negatives include no arthralgias, constipation, diarrhea, dysuria, fever, flatus, frequency, headaches, hematuria or nausea. Nothing aggravates the pain. The pain is relieved by certain positions (   pain  the  epigastric  area ). She has tried nothing for the symptoms. The treatment provided mild relief. Past Medical History:   Diagnosis Date    Acid reflux     Arthritis     Bleeding tendency (HCC)     Cancer ([facility] [address] 1989    cervical    Gallbladder disease 2010    History of scarlet fever     Hyperlipidemia     Hypertension     Joint pain     Medication intolerance     VERY SENSITIVE TO MEDICATIONS    Medication intolerance     VERY SENSITIVE TO MEDICATIONS     Swelling        Review of Systems   Constitutional: Negative for appetite change, fatigue and fever. HENT: Negative for congestion, ear pain, postnasal drip and sore throat. Eyes: Negative for pain and visual disturbance. Respiratory: Negative for cough, chest tightness, shortness of breath and wheezing. Cardiovascular: Negative for chest pain, palpitations and leg swelling. Gastrointestinal: Positive for abdominal pain and vomiting. Negative for constipation, diarrhea, flatus and nausea. Genitourinary: Negative for dysuria, frequency and hematuria. Musculoskeletal: Negative for arthralgias, joint swelling, neck pain and neck stiffness. Skin: Negative for rash. Neurological: Negative for dizziness, weakness, numbness and headaches. Hematological: Negative for adenopathy.  Does not bruise/bleed easily. Psychiatric/Behavioral: Negative for behavioral problems and sleep disturbance. The patient is not nervous/anxious. /72 (Site: Right Upper Arm, Position: Sitting, Cuff Size: Medium Adult)   Pulse 76   Temp 97.1 °F (36.2 °C) (Infrared)   Resp 16   Ht 5' 6\" (1.676 m)   Wt 159 lb 6 oz (72.3 kg)   BMI 25.72 kg/m²   Objective:   Physical Exam  Vitals signs and nursing note reviewed. Constitutional:       Appearance: She is well-developed. HENT:      Head: Normocephalic and atraumatic. Right Ear: External ear normal.      Left Ear: External ear normal.      Nose: Nose normal.   Eyes:      General: No scleral icterus. Conjunctiva/sclera: Conjunctivae normal.      Pupils: Pupils are equal, round, and reactive to light. Neck:      Musculoskeletal: Normal range of motion and neck supple. Thyroid: No thyromegaly. Vascular: No JVD. Cardiovascular:      Rate and Rhythm: Normal rate and regular rhythm. Heart sounds: Normal heart sounds. Pulmonary:      Effort: Pulmonary effort is normal.      Breath sounds: Normal breath sounds. No wheezing or rales. Abdominal:      General: Bowel sounds are normal. There is no distension. Palpations: Abdomen is soft. There is no mass. Tenderness: There is abdominal tenderness. There is no rebound. Comments:   Pain  Upper abdomen  With  gaurding    Musculoskeletal: Normal range of motion. General: No tenderness. Lymphadenopathy:      Cervical: No cervical adenopathy. Skin:     General: Skin is warm and dry. Findings: No rash. Neurological:      Mental Status: She is alert and oriented to person, place, and time. Cranial Nerves: No cranial nerve deficit. Deep Tendon Reflexes: Reflexes are normal and symmetric. Assessment:       Diagnosis Orders   1. Gastroesophageal reflux disease without esophagitis  POCT hemoglobin   2. Epigastric pain  POCT hemoglobin   3.  Essential hypertension Plan:      Current Outpatient Medications   Medication Sig Dispense Refill    traMADol (ULTRAM) 50 MG tablet Take 2 tablets by mouth three times daily for 30 days. 180 tablet 0    fluconazole (DIFLUCAN) 150 MG tablet Take 1 tablet now, and repeat in 2-3 days 2 tablet 1    potassium chloride (KLOR-CON M10) 10 MEQ extended release tablet TAKE 1 TABLET BY MOUTH EVERY DAY 30 tablet 5    predniSONE (DELTASONE) 20 MG tablet One tab po  Tid for  3 days and  Then one  Tab po  Bid  For 5 days and then one a day  For 5 days 24 tablet 0    zinc gluconate 50 MG tablet Take 50 mg by mouth daily      hydroCHLOROthiazide (HYDRODIURIL) 25 MG tablet TAKE 1 TABLET BY MOUTH EVERY OTHER DAY 45 tablet 3    ondansetron (ZOFRAN) 4 MG tablet Take 1 tablet by mouth every 6 hours as needed for Nausea or Vomiting 10 tablet 0    furosemide (LASIX) 20 MG tablet TAKE 1 TABLET BY MOUTH DAILY AS NEEDED (LEG EDEMA) (Patient taking differently: Take 20 mg by mouth every other day ) 60 tablet 1    diclofenac sodium (VOLTAREN) 1 % GEL APPLY 2 G TOPICALLY 4 TIMES DAILY 100 g 5    sucralfate (CARAFATE) 1 GM tablet Take 1 tablet by mouth 4 times daily 120 tablet 1    oxaprozin (DAYPRO) 600 MG tablet TAKE 1 TABLET BY MOUTH TWICE A DAY 60 tablet 5    esomeprazole (NEXIUM) 40 MG delayed release capsule TAKE 1 CAPSULE BY MOUTH 2 TIMES DAILY. 60 capsule 5    ferrous sulfate 325 (65 Fe) MG tablet Take 325 mg by mouth every other day       metaxalone (SKELAXIN) 800 MG tablet TAKE 1 TABLET BY MOUTH 3 TIMES DAILY (Patient taking differently: 3 times daily as needed TAKE 1 TABLET BY MOUTH 3 TIMES DAILY) 90 tablet 3    nitroGLYCERIN (NITROLINGUAL) 0.4 MG/SPRAY spray Place 1 spray under the tongue every 5 minutes as needed for Chest pain. 1 Bottle 1    Cholecalciferol (VITAMIN D3) 3000 UNITS TABS Take 5,000 Units by mouth daily.  folic acid (FOLVITE) 1 MG tablet Take 1 mg by mouth daily.         estrogens, conjugated, (PREMARIN) 0.9 MG tablet Take 0.9 mg by mouth daily.  Ascorbic Acid (VITAMIN C) 500 MG tablet Take 1,000 mg by mouth daily.  gabapentin (NEURONTIN) 300 MG capsule TAKE 1 CAPSULE BY MOUTH 2 TIMES DAILY 60 capsule 5     No current facility-administered medications for this visit.           Orders Placed This Encounter   Procedures    CBC Auto Differential     Standing Status:   Future     Standing Expiration Date:   5/10/2022    Hepatic Function Panel     Standing Status:   Future     Standing Expiration Date:   5/10/2022    POCT hemoglobin     Results for orders placed or performed in visit on 05/10/21   POCT hemoglobin   Result Value Ref Range    Hemoglobin 13.0          hgb  Ok  Still need  carafate  And  Do as  Liquid       Hold  Ultrasound  Of  Gallbladder  Until  Lab  Back      appt  Next month fiona Flaherty MD

## 2021-05-11 ENCOUNTER — TELEPHONE (OUTPATIENT)
Dept: FAMILY MEDICINE CLINIC | Age: 66
End: 2021-05-11

## 2021-05-11 NOTE — TELEPHONE ENCOUNTER
----- Message from Yaneth Nunez MD sent at 5/11/2021  5:35 AM EDT -----  Not anemic and liver functions are wnl . . needs to see gi and probably needs the EDG.   start the carafate as a slurry as dissolve in water as discussed and do 4 times a day and try to hold the mobic

## 2021-05-19 DIAGNOSIS — M47.816 DEGENERATIVE JOINT DISEASE OF CERVICAL AND LUMBAR SPINE: Chronic | ICD-10-CM

## 2021-05-19 DIAGNOSIS — M47.812 DEGENERATIVE JOINT DISEASE OF CERVICAL AND LUMBAR SPINE: Chronic | ICD-10-CM

## 2021-05-19 DIAGNOSIS — G89.29 CHRONIC PAIN OF BOTH KNEES: ICD-10-CM

## 2021-05-19 DIAGNOSIS — M25.561 CHRONIC PAIN OF BOTH KNEES: ICD-10-CM

## 2021-05-19 DIAGNOSIS — M25.562 CHRONIC PAIN OF BOTH KNEES: ICD-10-CM

## 2021-05-19 NOTE — TELEPHONE ENCOUNTER
Date of last visit:  5/10/2021  Date of next visit:  6/14/2021    Requested Prescriptions     Pending Prescriptions Disp Refills    traMADol (ULTRAM) 50 MG tablet 180 tablet 0     Sig: Take 2 tablets by mouth three times daily for 30 days.

## 2021-05-20 RX ORDER — TRAMADOL HYDROCHLORIDE 50 MG/1
100 TABLET ORAL 3 TIMES DAILY
Qty: 180 TABLET | Refills: 0 | Status: SHIPPED | OUTPATIENT
Start: 2021-05-20 | End: 2021-06-14 | Stop reason: SDUPTHER

## 2021-06-03 NOTE — TELEPHONE ENCOUNTER
Date of last visit:  5/10/2021  Date of next visit:  6/14/2021    Requested Prescriptions     Pending Prescriptions Disp Refills    sucralfate (CARAFATE) 1 GM tablet [Pharmacy Med Name: SUCRALFATE 1 GM TABLET] 120 tablet 1     Sig: TAKE 1 TABLET BY MOUTH 4 TIMES A DAY

## 2021-06-04 RX ORDER — SUCRALFATE 1 G/1
TABLET ORAL
Qty: 120 TABLET | Refills: 1 | Status: SHIPPED | OUTPATIENT
Start: 2021-06-04

## 2021-06-08 ENCOUNTER — TELEPHONE (OUTPATIENT)
Dept: FAMILY MEDICINE CLINIC | Age: 66
End: 2021-06-08

## 2021-06-08 NOTE — TELEPHONE ENCOUNTER
Joey from Dr Rosaura Luna office called req preop for left foot surgery scheduled 9-14-21 at [address]. All testing ordered.     Please call Joey at   [phone] ext [phone]

## 2021-06-14 ENCOUNTER — OFFICE VISIT (OUTPATIENT)
Dept: FAMILY MEDICINE CLINIC | Age: 66
End: 2021-06-14

## 2021-06-14 VITALS
HEART RATE: 64 BPM | WEIGHT: 157 LBS | DIASTOLIC BLOOD PRESSURE: 74 MMHG | HEIGHT: 66 IN | RESPIRATION RATE: 14 BRPM | BODY MASS INDEX: 25.23 KG/M2 | TEMPERATURE: 97.6 F | SYSTOLIC BLOOD PRESSURE: 128 MMHG

## 2021-06-14 DIAGNOSIS — J30.1 NON-SEASONAL ALLERGIC RHINITIS DUE TO POLLEN: ICD-10-CM

## 2021-06-14 DIAGNOSIS — M47.812 DEGENERATIVE JOINT DISEASE OF CERVICAL AND LUMBAR SPINE: Chronic | ICD-10-CM

## 2021-06-14 DIAGNOSIS — D69.9 BLEEDING TENDENCY (HCC): ICD-10-CM

## 2021-06-14 DIAGNOSIS — M25.561 CHRONIC PAIN OF BOTH KNEES: ICD-10-CM

## 2021-06-14 DIAGNOSIS — G89.29 CHRONIC PAIN OF BOTH KNEES: ICD-10-CM

## 2021-06-14 DIAGNOSIS — M25.562 CHRONIC PAIN OF BOTH KNEES: ICD-10-CM

## 2021-06-14 DIAGNOSIS — K21.9 GASTROESOPHAGEAL REFLUX DISEASE WITHOUT ESOPHAGITIS: ICD-10-CM

## 2021-06-14 DIAGNOSIS — M47.816 DEGENERATIVE JOINT DISEASE OF CERVICAL AND LUMBAR SPINE: Chronic | ICD-10-CM

## 2021-06-14 DIAGNOSIS — I73.00 RAYNAUD'S PHENOMENON WITHOUT GANGRENE: ICD-10-CM

## 2021-06-14 DIAGNOSIS — R60.9 PERIPHERAL EDEMA: ICD-10-CM

## 2021-06-14 DIAGNOSIS — I10 ESSENTIAL HYPERTENSION: Primary | ICD-10-CM

## 2021-06-14 DIAGNOSIS — M79.672 FOOT PAIN, LEFT: ICD-10-CM

## 2021-06-14 PROCEDURE — 99213 OFFICE O/P EST LOW 20 MIN: CPT | Performed by: FAMILY MEDICINE

## 2021-06-14 RX ORDER — IPRATROPIUM BROMIDE 42 UG/1
1 SPRAY, METERED NASAL DAILY
Qty: 1 BOTTLE | Refills: 5 | Status: SHIPPED | OUTPATIENT
Start: 2021-06-14 | End: 2022-02-02

## 2021-06-14 RX ORDER — IPRATROPIUM BROMIDE 42 UG/1
1 SPRAY, METERED NASAL DAILY
COMMUNITY
Start: 2021-06-13 | End: 2021-06-14 | Stop reason: SDUPTHER

## 2021-06-14 RX ORDER — ESOMEPRAZOLE MAGNESIUM 40 MG/1
CAPSULE, DELAYED RELEASE ORAL
Qty: 60 CAPSULE | Refills: 5 | Status: SHIPPED | OUTPATIENT
Start: 2021-06-14 | End: 2021-12-03

## 2021-06-14 RX ORDER — TRAMADOL HYDROCHLORIDE 50 MG/1
100 TABLET ORAL 3 TIMES DAILY
Qty: 180 TABLET | Refills: 0 | Status: SHIPPED | OUTPATIENT
Start: 2021-06-14 | End: 2021-07-23 | Stop reason: SDUPTHER

## 2021-06-14 RX ORDER — FUROSEMIDE 20 MG/1
20 TABLET ORAL EVERY OTHER DAY
Qty: 30 TABLET | Refills: 5 | Status: SHIPPED | OUTPATIENT
Start: 2021-06-14 | End: 2021-08-04

## 2021-06-14 SDOH — ECONOMIC STABILITY: FOOD INSECURITY: WITHIN THE PAST 12 MONTHS, THE FOOD YOU BOUGHT JUST DIDN'T LAST AND YOU DIDN'T HAVE MONEY TO GET MORE.: NEVER TRUE

## 2021-06-14 SDOH — ECONOMIC STABILITY: FOOD INSECURITY: WITHIN THE PAST 12 MONTHS, YOU WORRIED THAT YOUR FOOD WOULD RUN OUT BEFORE YOU GOT MONEY TO BUY MORE.: NEVER TRUE

## 2021-06-14 ASSESSMENT — ENCOUNTER SYMPTOMS
SHORTNESS OF BREATH: 0
SORE THROAT: 0
EYE PAIN: 0
CONSTIPATION: 0
CHEST TIGHTNESS: 0
NAUSEA: 0
ABDOMINAL PAIN: 0
WHEEZING: 0
COUGH: 0

## 2021-06-14 ASSESSMENT — SOCIAL DETERMINANTS OF HEALTH (SDOH): HOW HARD IS IT FOR YOU TO PAY FOR THE VERY BASICS LIKE FOOD, HOUSING, MEDICAL CARE, AND HEATING?: NOT HARD AT ALL

## 2021-06-14 NOTE — LETTER
[facility]  [id number] [place] [address]  Phone: [phone]  Fax: [phone]    Nataly Chavarria MD         June 14, 2021     Patient: Radha Jernigan   YOB: 1955   Date of Visit: 6/14/2021       To Whom It May Concern: It is my medical opinion that Vania Ferrer requires a disability parking placard for the following reasons:  She has limited walking ability due to an orthopedic condition. Duration of need: 5 year    If you have any questions or concerns, please don't hesitate to call.     Sincerely,        Nataly Chavarria MD

## 2021-06-14 NOTE — TELEPHONE ENCOUNTER
Sean Medina with [facility] informed we will clear her, she has an appt this afternoon at 4:30pm so we will schedule her for her Pre-Op then.

## 2021-06-16 ENCOUNTER — TELEPHONE (OUTPATIENT)
Dept: FAMILY MEDICINE CLINIC | Age: 66
End: 2021-06-16

## 2021-06-16 DIAGNOSIS — M47.816 DEGENERATIVE JOINT DISEASE OF CERVICAL AND LUMBAR SPINE: Chronic | ICD-10-CM

## 2021-06-16 DIAGNOSIS — M47.812 DEGENERATIVE JOINT DISEASE OF CERVICAL AND LUMBAR SPINE: Chronic | ICD-10-CM

## 2021-06-16 RX ORDER — PREDNISONE 20 MG/1
TABLET ORAL
Qty: 24 TABLET | Refills: 0 | Status: SHIPPED | OUTPATIENT
Start: 2021-06-16 | End: 2021-12-06 | Stop reason: ALTCHOICE

## 2021-07-22 DIAGNOSIS — M47.812 DEGENERATIVE JOINT DISEASE OF CERVICAL AND LUMBAR SPINE: Chronic | ICD-10-CM

## 2021-07-22 DIAGNOSIS — M47.816 DEGENERATIVE JOINT DISEASE OF CERVICAL AND LUMBAR SPINE: Chronic | ICD-10-CM

## 2021-07-22 DIAGNOSIS — M25.562 CHRONIC PAIN OF BOTH KNEES: ICD-10-CM

## 2021-07-22 DIAGNOSIS — M25.561 CHRONIC PAIN OF BOTH KNEES: ICD-10-CM

## 2021-07-22 DIAGNOSIS — G89.29 CHRONIC PAIN OF BOTH KNEES: ICD-10-CM

## 2021-07-22 NOTE — TELEPHONE ENCOUNTER
Corinne called requesting a refill of their:    traMADol (ULTRAM) 50 MG tablet Take 2 tablets by mouth three times daily     Send to [facility] on [address]    Pt would like for this to be called in ASAP as she will run out over the weekend.

## 2021-07-22 NOTE — TELEPHONE ENCOUNTER
Date of last visit:  6/14/2021  Date of next visit:  9/7/2021    Requested Prescriptions     Pending Prescriptions Disp Refills    traMADol (ULTRAM) 50 MG tablet 180 tablet 0     Sig: Take 2 tablets by mouth three times daily for 30 days.

## 2021-07-23 RX ORDER — TRAMADOL HYDROCHLORIDE 50 MG/1
100 TABLET ORAL 3 TIMES DAILY
Qty: 180 TABLET | Refills: 0 | Status: SHIPPED | OUTPATIENT
Start: 2021-07-23 | End: 2021-08-24 | Stop reason: SDUPTHER

## 2021-07-31 DIAGNOSIS — I10 ESSENTIAL HYPERTENSION: ICD-10-CM

## 2021-07-31 DIAGNOSIS — R60.9 PERIPHERAL EDEMA: ICD-10-CM

## 2021-08-02 NOTE — TELEPHONE ENCOUNTER
Date of last visit:  6/14/2021  Date of next visit:  9/7/2021    Requested Prescriptions     Pending Prescriptions Disp Refills    furosemide (LASIX) 20 MG tablet [Pharmacy Med Name: FUROSEMIDE 20 MG TABLET] 60 tablet 1     Sig: TAKE 1 TABLET BY MOUTH DAILY AS NEEDED (LEG EDEMA)

## 2021-08-04 RX ORDER — FUROSEMIDE 20 MG/1
TABLET ORAL
Qty: 60 TABLET | Refills: 1 | Status: SHIPPED | OUTPATIENT
Start: 2021-08-04 | End: 2022-03-01

## 2021-08-23 ENCOUNTER — HOSPITAL ENCOUNTER (OUTPATIENT)
Dept: GENERAL RADIOLOGY | Age: 66
Discharge: HOME | End: 2021-08-23
Payer: COMMERCIAL

## 2021-08-23 ENCOUNTER — HOSPITAL ENCOUNTER (OUTPATIENT)
Age: 66
Discharge: HOME | End: 2021-08-23
Payer: COMMERCIAL

## 2021-08-23 DIAGNOSIS — G89.29 CHRONIC PAIN OF BOTH KNEES: ICD-10-CM

## 2021-08-23 DIAGNOSIS — M25.561 CHRONIC PAIN OF BOTH KNEES: ICD-10-CM

## 2021-08-23 DIAGNOSIS — M25.562 CHRONIC PAIN OF BOTH KNEES: ICD-10-CM

## 2021-08-23 DIAGNOSIS — M47.816 DEGENERATIVE JOINT DISEASE OF CERVICAL AND LUMBAR SPINE: Chronic | ICD-10-CM

## 2021-08-23 DIAGNOSIS — M47.812 DEGENERATIVE JOINT DISEASE OF CERVICAL AND LUMBAR SPINE: Chronic | ICD-10-CM

## 2021-08-23 DIAGNOSIS — Z01.818 PRE-OP TESTING: ICD-10-CM

## 2021-08-23 LAB
ANION GAP SERPL CALCULATED.3IONS-SCNC: 13 MEQ/L (ref 8–16)
BUN BLDV-MCNC: 24 MG/DL (ref 7–22)
CALCIUM SERPL-MCNC: 9 MG/DL (ref 8.5–10.5)
CHLORIDE BLD-SCNC: 101 MEQ/L (ref 98–111)
CO2: 29 MEQ/L (ref 23–33)
CREAT SERPL-MCNC: 0.8 MG/DL (ref 0.4–1.2)
EKG ATRIAL RATE: 62 BPM
EKG P AXIS: 50 DEGREES
EKG P-R INTERVAL: 146 MS
EKG Q-T INTERVAL: 456 MS
EKG QRS DURATION: 100 MS
EKG QTC CALCULATION (BAZETT): 462 MS
EKG R AXIS: 65 DEGREES
EKG T AXIS: 62 DEGREES
EKG VENTRICULAR RATE: 62 BPM
ERYTHROCYTE [DISTWIDTH] IN BLOOD BY AUTOMATED COUNT: 15.6 % (ref 11.5–14.5)
ERYTHROCYTE [DISTWIDTH] IN BLOOD BY AUTOMATED COUNT: 53.1 FL (ref 35–45)
GFR SERPL CREATININE-BSD FRML MDRD: 72 ML/MIN/1.73M2
GLUCOSE BLD-MCNC: 99 MG/DL (ref 70–108)
HCT VFR BLD CALC: 39 % (ref 37–47)
HEMOGLOBIN: 12.4 GM/DL (ref 12–16)
MCH RBC QN AUTO: 29.5 PG (ref 26–33)
MCHC RBC AUTO-ENTMCNC: 31.8 GM/DL (ref 32.2–35.5)
MCV RBC AUTO: 92.9 FL (ref 81–99)
PLATELET # BLD: 257 THOU/MM3 (ref 130–400)
PMV BLD AUTO: 9.3 FL (ref 9.4–12.4)
POTASSIUM SERPL-SCNC: 3 MEQ/L (ref 3.5–5.2)
RBC # BLD: 4.2 MILL/MM3 (ref 4.2–5.4)
SODIUM BLD-SCNC: 143 MEQ/L (ref 135–145)
WBC # BLD: 5.9 THOU/MM3 (ref 4.8–10.8)

## 2021-08-23 PROCEDURE — 87081 CULTURE SCREEN ONLY: CPT

## 2021-08-23 PROCEDURE — 93005 ELECTROCARDIOGRAM TRACING: CPT | Performed by: ORTHOPAEDIC SURGERY

## 2021-08-23 PROCEDURE — 87147 CULTURE TYPE IMMUNOLOGIC: CPT

## 2021-08-23 PROCEDURE — 71045 X-RAY EXAM CHEST 1 VIEW: CPT

## 2021-08-23 PROCEDURE — 36415 COLL VENOUS BLD VENIPUNCTURE: CPT

## 2021-08-23 PROCEDURE — 93010 ELECTROCARDIOGRAM REPORT: CPT | Performed by: INTERNAL MEDICINE

## 2021-08-23 PROCEDURE — 80048 BASIC METABOLIC PNL TOTAL CA: CPT

## 2021-08-23 PROCEDURE — 85027 COMPLETE CBC AUTOMATED: CPT

## 2021-08-24 RX ORDER — TRAMADOL HYDROCHLORIDE 50 MG/1
100 TABLET ORAL 3 TIMES DAILY
Qty: 180 TABLET | Refills: 0 | Status: SHIPPED | OUTPATIENT
Start: 2021-08-24 | End: 2021-09-28 | Stop reason: SDUPTHER

## 2021-08-26 LAB — MRSA SCREEN: NORMAL

## 2021-09-07 ENCOUNTER — OFFICE VISIT (OUTPATIENT)
Dept: FAMILY MEDICINE CLINIC | Age: 66
End: 2021-09-07

## 2021-09-07 VITALS
HEIGHT: 66 IN | HEART RATE: 72 BPM | RESPIRATION RATE: 18 BRPM | TEMPERATURE: 97.2 F | SYSTOLIC BLOOD PRESSURE: 138 MMHG | WEIGHT: 158.8 LBS | DIASTOLIC BLOOD PRESSURE: 82 MMHG | BODY MASS INDEX: 25.52 KG/M2

## 2021-09-07 DIAGNOSIS — M47.812 DEGENERATIVE JOINT DISEASE OF CERVICAL AND LUMBAR SPINE: Chronic | ICD-10-CM

## 2021-09-07 DIAGNOSIS — M25.561 CHRONIC PAIN OF BOTH KNEES: ICD-10-CM

## 2021-09-07 DIAGNOSIS — M47.816 DEGENERATIVE JOINT DISEASE OF CERVICAL AND LUMBAR SPINE: Chronic | ICD-10-CM

## 2021-09-07 DIAGNOSIS — K21.9 GASTROESOPHAGEAL REFLUX DISEASE WITHOUT ESOPHAGITIS: ICD-10-CM

## 2021-09-07 DIAGNOSIS — I10 ESSENTIAL HYPERTENSION: ICD-10-CM

## 2021-09-07 DIAGNOSIS — G89.29 CHRONIC PAIN OF BOTH KNEES: ICD-10-CM

## 2021-09-07 DIAGNOSIS — M89.8X7 PAIN IN METATARSUS OF LEFT FOOT: Primary | ICD-10-CM

## 2021-09-07 DIAGNOSIS — M25.562 CHRONIC PAIN OF BOTH KNEES: ICD-10-CM

## 2021-09-07 DIAGNOSIS — I73.00 RAYNAUD'S PHENOMENON WITHOUT GANGRENE: ICD-10-CM

## 2021-09-07 PROCEDURE — 99214 OFFICE O/P EST MOD 30 MIN: CPT | Performed by: FAMILY MEDICINE

## 2021-09-07 ASSESSMENT — ENCOUNTER SYMPTOMS
WHEEZING: 0
EYE PAIN: 0
SHORTNESS OF BREATH: 1
ABDOMINAL PAIN: 0
CHEST TIGHTNESS: 0
CONSTIPATION: 0
SORE THROAT: 0
COUGH: 0
NAUSEA: 0

## 2021-09-07 NOTE — PROGRESS NOTES
Subjective:      Patient ID: Velma Joiner is a 60 y.o. female. Left  Foot   Surgery  Dr Aaron Michaels  On  9-14-21      On  Boot      off antinflammatory  Noted  thje  daypro      non  Weight bearing  6  Weeks and  Then off the  legs  Noted      anemia  Better       potassium low  Needs  More  Potassium     Hypertension  This is a chronic problem. The current episode started more than 1 year ago. The problem is controlled. Associated symptoms include shortness of breath. Pertinent negatives include no anxiety, chest pain, headaches, neck pain, palpitations or peripheral edema. The current treatment provides significant improvement. There are no compliance problems. Gastroesophageal Reflux  She reports no abdominal pain, no chest pain, no coughing, no nausea, no sore throat or no wheezing. This is a chronic problem. The problem occurs rarely. The problem has been resolved. The symptoms are aggravated by certain foods. Pertinent negatives include no fatigue. She has tried a PPI for the symptoms. The treatment provided significant relief.      Past Medical History:   Diagnosis Date    Acid reflux     Arthritis     Bleeding tendency (HCC)     Cancer ([facility] [address] 1989    cervical    Gallbladder disease 2010    History of scarlet fever     Hyperlipidemia     Hypertension     Joint pain     Medication intolerance     VERY SENSITIVE TO MEDICATIONS    Medication intolerance     VERY SENSITIVE TO MEDICATIONS     Swelling      Past Surgical History:   Procedure Laterality Date    BACK SURGERY      BACK SURGERY  01/11/2019    Dr Pepper Catalan @ [facility]       L1  to  L5  with    rods     BLADDER REPAIR  2006    CARDIAC CATHETERIZATION  2009     wnl  and  dr. Pascual Molina TEST  07/30/2009    EF 55% C NO OBVIOUS ISCHEMIA    CARDIOVASCULAR STRESS TEST  09/02/2005    EF 66% C MILD APICAL STRESS INDUCED ISCHEMIA    CERVICAL FUSION  11-22-13    Dr. Pepper Catalan @ [facility], 4-5, 5-6, 6-7     COLONOSCOPY  2018      normal and good   10 year s   1 [place] dumonthbso    JOINT REPLACEMENT Right 2017    2nd stage revision     KNEE ARTHROPLASTY      LT    KNEE ARTHROSCOPY  [id number]    KNEE ARTHROSCOPY Right 14    Meniscus Tear By Dr Francesca Orellana @ [facility]     KNEE ARTHROSCOPY Right 16 and 10-25-16    Dr Francesca Orellana @ [address] Right 2017    Antibiotic Spacer Placement - Dr. Vicente Alvarado  1-10-15    Dr Harl Holstein @ [address] Left 2010    analisa    TRANSTHORACIC ECHOCARDIOGRAM  2009    EF 50% MILD LT VENTRICULAR HYPERTROPHY,TRIVIAL MR AND TR, TRIVIAL AORTIC INSUFFICIENCY,DIASTOLIC DYSFUNCTION OF THE LT VENTRICLE    TRANSTHORACIC ECHOCARDIOGRAM  2005    EF 60% C TRACE MR AND MILD TR    UPPER GASTROINTESTINAL ENDOSCOPY      [facility]     Social History     Socioeconomic History    Marital status:      Spouse name: Not on file    Number of children: Not on file    Years of education: Not on file    Highest education level: Not on file   Occupational History    Not on file   Tobacco Use    Smoking status: Former Smoker     Packs/day: 1.00     Years: 3.00     Pack years: 3.00     Quit date: 1975     Years since quittin.8    Smokeless tobacco: Never Used   Vaping Use    Vaping Use: Never used   Substance and Sexual Activity    Alcohol use: No    Drug use: No    Sexual activity: Yes     Partners: Male   Other Topics Concern    Not on file   Social History Narrative    Not on file     Social Determinants of Health     Financial Resource Strain: Low Risk     Difficulty of Paying Living Expenses: Not hard at all   Food Insecurity: No Food Insecurity    Worried About [address] in the Last Year: Never true    [facility] in the Last Year: Never true   Transportation Needs:     Lack of Transportation (Medical):      Lack of Transportation (Non-Medical):    Physical Activity:  Days of Exercise per Week:     Minutes of Exercise per Session:    Stress:     Feeling of Stress :    Social Connections:     Frequency of Communication with Friends and Family:     Frequency of Social Gatherings with Friends and Family:     Attends Alevism Services:     Active Member of Clubs or Organizations:     Attends Club or Organization Meetings:     Marital Status:    Intimate Partner Violence:     Fear of Current or Ex-Partner:     Emotionally Abused:     Physically Abused:     Sexually Abused:      Family History   Problem Relation Age of Onset    Heart Disease Mother     Hypertension Mother     Heart Disease Father     Hypertension Father     Stroke Father      Review of Systems   Constitutional: Negative for appetite change, fatigue and fever. HENT: Negative for congestion, ear pain, postnasal drip and sore throat. Eyes: Negative for pain and visual disturbance. Respiratory: Positive for shortness of breath. Negative for cough, chest tightness and wheezing. Cardiovascular: Negative for chest pain, palpitations and leg swelling. Gastrointestinal: Negative for abdominal pain, constipation and nausea. Genitourinary: Negative for dysuria and frequency. Musculoskeletal: Negative for arthralgias, joint swelling, neck pain and neck stiffness. Skin: Negative for rash. Neurological: Negative for dizziness, weakness, numbness and headaches. Hematological: Negative for adenopathy. Does not bruise/bleed easily. Psychiatric/Behavioral: Negative for behavioral problems and sleep disturbance. The patient is not nervous/anxious. /82 (Site: Right Upper Arm, Position: Sitting, Cuff Size: Medium Adult)   Pulse 72   Temp 97.2 °F (36.2 °C) (Infrared)   Resp 18   Ht 5' 6\" (1.676 m)   Wt 158 lb 12.8 oz (72 kg)   BMI 25.63 kg/m²   Objective:   Physical Exam  Vitals and nursing note reviewed. Constitutional:       Appearance: She is well-developed.    HENT: Head: Normocephalic and atraumatic. Right Ear: External ear normal.      Left Ear: External ear normal.      Nose: Nose normal.   Eyes:      General: No scleral icterus. Conjunctiva/sclera: Conjunctivae normal.      Pupils: Pupils are equal, round, and reactive to light. Neck:      Thyroid: No thyromegaly. Vascular: No JVD. Cardiovascular:      Rate and Rhythm: Normal rate and regular rhythm. Heart sounds: Normal heart sounds. Pulmonary:      Effort: Pulmonary effort is normal.      Breath sounds: Normal breath sounds. No wheezing or rales. Abdominal:      General: Bowel sounds are normal. There is no distension. Palpations: Abdomen is soft. There is no mass. Tenderness: There is no abdominal tenderness. Musculoskeletal:         General: No tenderness. Normal range of motion. Cervical back: Normal range of motion and neck supple. Lymphadenopathy:      Cervical: No cervical adenopathy. Skin:     General: Skin is warm and dry. Findings: No rash. Neurological:      Mental Status: She is alert and oriented to person, place, and time. Cranial Nerves: No cranial nerve deficit. Deep Tendon Reflexes: Reflexes are normal and symmetric. Assessment:       Diagnosis Orders   1. Pain in metatarsus of left foot     2. Essential hypertension     3. Chronic pain of both knees     4. Degenerative joint disease of cervical and lumbar spine     5. Raynaud's phenomenon without gangrene     6. Gastroesophageal reflux disease without esophagitis           Plan:      Current Outpatient Medications   Medication Sig Dispense Refill    traMADol (ULTRAM) 50 MG tablet Take 2 tablets by mouth three times daily for 30 days. 180 tablet 0    furosemide (LASIX) 20 MG tablet TAKE 1 TABLET BY MOUTH DAILY AS NEEDED (LEG EDEMA) 60 tablet 1    esomeprazole (NEXIUM) 40 MG delayed release capsule TAKE 1 CAPSULE BY MOUTH 2 TIMES DAILY.  60 capsule 5    ipratropium (ATROVENT) 0.06 %

## 2021-09-27 DIAGNOSIS — M47.812 DEGENERATIVE JOINT DISEASE OF CERVICAL AND LUMBAR SPINE: Chronic | ICD-10-CM

## 2021-09-27 DIAGNOSIS — M25.561 CHRONIC PAIN OF BOTH KNEES: ICD-10-CM

## 2021-09-27 DIAGNOSIS — M25.562 CHRONIC PAIN OF BOTH KNEES: ICD-10-CM

## 2021-09-27 DIAGNOSIS — M47.816 DEGENERATIVE JOINT DISEASE OF CERVICAL AND LUMBAR SPINE: Chronic | ICD-10-CM

## 2021-09-27 DIAGNOSIS — G89.29 CHRONIC PAIN OF BOTH KNEES: ICD-10-CM

## 2021-09-27 NOTE — TELEPHONE ENCOUNTER
Date of last visit:  9/7/2021  Date of next visit:  Visit date not found    Requested Prescriptions     Pending Prescriptions Disp Refills    traMADol (ULTRAM) 50 MG tablet 180 tablet 0     Sig: Take 2 tablets by mouth three times daily for 30 days. Patient called requesting a refill- she has only been taking the Pain med Dr Robert Ruano ordered at night, and she is still off her Daypro.

## 2021-09-28 RX ORDER — TRAMADOL HYDROCHLORIDE 50 MG/1
100 TABLET ORAL 3 TIMES DAILY
Qty: 180 TABLET | Refills: 0 | Status: SHIPPED | OUTPATIENT
Start: 2021-09-28 | End: 2021-10-28 | Stop reason: SDUPTHER

## 2021-10-27 DIAGNOSIS — M25.562 CHRONIC PAIN OF BOTH KNEES: ICD-10-CM

## 2021-10-27 DIAGNOSIS — M47.812 DEGENERATIVE JOINT DISEASE OF CERVICAL AND LUMBAR SPINE: Chronic | ICD-10-CM

## 2021-10-27 DIAGNOSIS — G89.29 CHRONIC PAIN OF BOTH KNEES: ICD-10-CM

## 2021-10-27 DIAGNOSIS — M25.561 CHRONIC PAIN OF BOTH KNEES: ICD-10-CM

## 2021-10-27 DIAGNOSIS — M47.816 DEGENERATIVE JOINT DISEASE OF CERVICAL AND LUMBAR SPINE: Chronic | ICD-10-CM

## 2021-10-27 NOTE — TELEPHONE ENCOUNTER
Date of last visit:  9/7/2021  Date of next visit:  Visit date not found    Requested Prescriptions     Pending Prescriptions Disp Refills    traMADol (ULTRAM) 50 MG tablet 180 tablet 0     Sig: Take 2 tablets by mouth three times daily for 30 days.

## 2021-10-28 RX ORDER — TRAMADOL HYDROCHLORIDE 50 MG/1
100 TABLET ORAL 3 TIMES DAILY
Qty: 180 TABLET | Refills: 0 | Status: SHIPPED | OUTPATIENT
Start: 2021-10-28 | End: 2021-11-24 | Stop reason: SDUPTHER

## 2021-11-05 ENCOUNTER — NURSE ONLY (OUTPATIENT)
Dept: FAMILY MEDICINE CLINIC | Age: 66
End: 2021-11-05

## 2021-11-05 DIAGNOSIS — Z23 NEED FOR INFLUENZA VACCINATION: Primary | ICD-10-CM

## 2021-11-05 PROCEDURE — G0008 ADMIN INFLUENZA VIRUS VAC: HCPCS | Performed by: FAMILY MEDICINE

## 2021-11-05 PROCEDURE — 90756 CCIIV4 VACC ABX FREE IM: CPT | Performed by: FAMILY MEDICINE

## 2021-11-05 NOTE — PROGRESS NOTES
Immunizations Administered     Name Date Dose Route    Influenza, [facility] 48 Quadv, with preserv IM (Flucelvax 2 yrs and older) 11/5/2021 0.5 mL Intramuscular    Site: Deltoid- Right    Lot: [id number]    [address]: 66253-610-19

## 2021-11-23 DIAGNOSIS — G89.29 CHRONIC PAIN OF BOTH KNEES: ICD-10-CM

## 2021-11-23 DIAGNOSIS — M47.816 DEGENERATIVE JOINT DISEASE OF CERVICAL AND LUMBAR SPINE: Chronic | ICD-10-CM

## 2021-11-23 DIAGNOSIS — M25.562 CHRONIC PAIN OF BOTH KNEES: ICD-10-CM

## 2021-11-23 DIAGNOSIS — M25.561 CHRONIC PAIN OF BOTH KNEES: ICD-10-CM

## 2021-11-23 DIAGNOSIS — M47.812 DEGENERATIVE JOINT DISEASE OF CERVICAL AND LUMBAR SPINE: Chronic | ICD-10-CM

## 2021-11-23 NOTE — TELEPHONE ENCOUNTER
Date of last visit:  9/7/2021  Date of next visit:  12/6/2021    Requested Prescriptions     Pending Prescriptions Disp Refills    traMADol (ULTRAM) 50 MG tablet 180 tablet 0     Sig: Take 2 tablets by mouth three times daily for 30 days. Patient requested the refill, stated she knows it is not Due til 11- but with the Holiday Week and Dr Caroline Pedraza not being in the office she is requesting it be filled. She is aware the Pharmacy will not fill it early for her.

## 2021-11-24 RX ORDER — TRAMADOL HYDROCHLORIDE 50 MG/1
100 TABLET ORAL 3 TIMES DAILY
Qty: 180 TABLET | Refills: 0 | Status: SHIPPED | OUTPATIENT
Start: 2021-11-24 | End: 2021-12-24 | Stop reason: SDUPTHER

## 2021-12-02 DIAGNOSIS — K21.9 GASTROESOPHAGEAL REFLUX DISEASE WITHOUT ESOPHAGITIS: ICD-10-CM

## 2021-12-02 NOTE — TELEPHONE ENCOUNTER
Date of last visit:  9/7/2021  Date of next visit:  12/6/2021    Requested Prescriptions     Pending Prescriptions Disp Refills    esomeprazole (MoPub) 40 MG delayed release capsule [Pharmacy Med Name: ESOMEPRAZOLE MAG DR 40 MG CAP] 60 capsule 5     Sig: TAKE 1 CAPSULE BY MOUTH TWICE A DAY

## 2021-12-03 RX ORDER — ESOMEPRAZOLE MAGNESIUM 40 MG/1
CAPSULE, DELAYED RELEASE ORAL
Qty: 60 CAPSULE | Refills: 5 | Status: SHIPPED | OUTPATIENT
Start: 2021-12-03 | End: 2022-05-25 | Stop reason: SDUPTHER

## 2021-12-06 ENCOUNTER — OFFICE VISIT (OUTPATIENT)
Dept: FAMILY MEDICINE CLINIC | Age: 66
End: 2021-12-06

## 2021-12-06 VITALS
HEIGHT: 66 IN | RESPIRATION RATE: 12 BRPM | WEIGHT: 157.5 LBS | TEMPERATURE: 96.8 F | BODY MASS INDEX: 25.31 KG/M2 | SYSTOLIC BLOOD PRESSURE: 136 MMHG | HEART RATE: 64 BPM | DIASTOLIC BLOOD PRESSURE: 78 MMHG

## 2021-12-06 DIAGNOSIS — K21.9 GASTROESOPHAGEAL REFLUX DISEASE WITHOUT ESOPHAGITIS: ICD-10-CM

## 2021-12-06 DIAGNOSIS — M25.561 CHRONIC PAIN OF BOTH KNEES: ICD-10-CM

## 2021-12-06 DIAGNOSIS — M47.812 DEGENERATIVE JOINT DISEASE OF CERVICAL AND LUMBAR SPINE: Chronic | ICD-10-CM

## 2021-12-06 DIAGNOSIS — I73.00 RAYNAUD'S PHENOMENON WITHOUT GANGRENE: ICD-10-CM

## 2021-12-06 DIAGNOSIS — I10 ESSENTIAL HYPERTENSION: Primary | ICD-10-CM

## 2021-12-06 DIAGNOSIS — M25.562 CHRONIC PAIN OF BOTH KNEES: ICD-10-CM

## 2021-12-06 DIAGNOSIS — M47.816 DEGENERATIVE JOINT DISEASE OF CERVICAL AND LUMBAR SPINE: Chronic | ICD-10-CM

## 2021-12-06 DIAGNOSIS — G89.29 CHRONIC PAIN OF BOTH KNEES: ICD-10-CM

## 2021-12-06 PROCEDURE — 4040F PNEUMOC VAC/ADMIN/RCVD: CPT | Performed by: FAMILY MEDICINE

## 2021-12-06 PROCEDURE — 99213 OFFICE O/P EST LOW 20 MIN: CPT | Performed by: FAMILY MEDICINE

## 2021-12-06 PROCEDURE — G8400 PT W/DXA NO RESULTS DOC: HCPCS | Performed by: FAMILY MEDICINE

## 2021-12-06 PROCEDURE — G8417 CALC BMI ABV UP PARAM F/U: HCPCS | Performed by: FAMILY MEDICINE

## 2021-12-06 PROCEDURE — 1090F PRES/ABSN URINE INCON ASSESS: CPT | Performed by: FAMILY MEDICINE

## 2021-12-06 PROCEDURE — G8427 DOCREV CUR MEDS BY ELIG CLIN: HCPCS | Performed by: FAMILY MEDICINE

## 2021-12-06 PROCEDURE — 1123F ACP DISCUSS/DSCN MKR DOCD: CPT | Performed by: FAMILY MEDICINE

## 2021-12-06 PROCEDURE — 3017F COLORECTAL CA SCREEN DOC REV: CPT | Performed by: FAMILY MEDICINE

## 2021-12-06 PROCEDURE — 1036F TOBACCO NON-USER: CPT | Performed by: FAMILY MEDICINE

## 2021-12-06 ASSESSMENT — ENCOUNTER SYMPTOMS
NAUSEA: 0
CONSTIPATION: 0
CHEST TIGHTNESS: 0
SHORTNESS OF BREATH: 0
WHEEZING: 0
EYE PAIN: 0
COUGH: 0
SORE THROAT: 0
ORTHOPNEA: 0
ABDOMINAL PAIN: 0

## 2021-12-06 NOTE — PROGRESS NOTES
Subjective:      Patient ID: Alejandra Arrieta is a 73 y.o. female. Post  Left  Foot  Surgery noted         Chronic  Knee and back pain      n    Hypertension  This is a chronic problem. The current episode started more than 1 year ago. The problem has been resolved since onset. The problem is controlled. Pertinent negatives include no chest pain, headaches, neck pain, orthopnea, palpitations, peripheral edema or shortness of breath. The current treatment provides significant improvement. There are no compliance problems. Gastroesophageal Reflux  She reports no abdominal pain, no chest pain, no coughing, no early satiety, no nausea, no sore throat or no wheezing. This is a chronic problem. The current episode started more than 1 year ago. The problem occurs rarely. The problem has been resolved. The symptoms are aggravated by certain foods. Pertinent negatives include no fatigue. She has tried a PPI for the symptoms. The treatment provided significant relief. Past Medical History:   Diagnosis Date    Acid reflux     Arthritis     Bleeding tendency (HCC)     Cancer ([facility] [address] 1989    cervical    Gallbladder disease 2010    History of scarlet fever     Hyperlipidemia     Hypertension     Joint pain     Medication intolerance     VERY SENSITIVE TO MEDICATIONS    Medication intolerance     VERY SENSITIVE TO MEDICATIONS     Swelling      Review of Systems   Constitutional: Negative for appetite change, fatigue and fever. HENT: Negative for congestion, ear pain, postnasal drip and sore throat. Eyes: Negative for pain and visual disturbance. Respiratory: Negative for cough, chest tightness, shortness of breath and wheezing. Cardiovascular: Negative for chest pain, palpitations, orthopnea and leg swelling. Gastrointestinal: Negative for abdominal pain, constipation and nausea. Genitourinary: Negative for dysuria and frequency.    Musculoskeletal: Negative for arthralgias, joint swelling, neck pain and neck stiffness. Skin: Negative for rash. Neurological: Negative for dizziness, weakness, numbness and headaches. Hematological: Negative for adenopathy. Does not bruise/bleed easily. Psychiatric/Behavioral: Negative for behavioral problems and sleep disturbance. The patient is not nervous/anxious. /78 (Site: Right Upper Arm, Position: Sitting, Cuff Size: Medium Adult)   Pulse 64   Temp 96.8 °F (36 °C) (Infrared)   Resp 12   Ht 5' 6\" (1.676 m)   Wt 157 lb 8 oz (71.4 kg)   BMI 25.42 kg/m²   Objective:   Physical Exam  Vitals and nursing note reviewed. Constitutional:       Appearance: She is well-developed. HENT:      Head: Normocephalic and atraumatic. Right Ear: External ear normal.      Left Ear: External ear normal.      Nose: Nose normal.   Eyes:      General: No scleral icterus. Conjunctiva/sclera: Conjunctivae normal.      Pupils: Pupils are equal, round, and reactive to light. Neck:      Thyroid: No thyromegaly. Vascular: No JVD. Cardiovascular:      Rate and Rhythm: Normal rate and regular rhythm. Heart sounds: Normal heart sounds. Pulmonary:      Effort: Pulmonary effort is normal.      Breath sounds: Normal breath sounds. No wheezing or rales. Abdominal:      General: Bowel sounds are normal. There is no distension. Palpations: Abdomen is soft. There is no mass. Tenderness: There is no abdominal tenderness. Musculoskeletal:         General: No tenderness. Normal range of motion. Cervical back: Normal range of motion and neck supple. Comments:  Left  Foot  Boot   On    Lymphadenopathy:      Cervical: No cervical adenopathy. Skin:     General: Skin is warm and dry. Findings: No rash. Neurological:      Mental Status: She is alert and oriented to person, place, and time. Cranial Nerves: No cranial nerve deficit. Deep Tendon Reflexes: Reflexes are normal and symmetric.          Assessment:       Diagnosis Orders   1. Essential hypertension     2. Degenerative joint disease of cervical and lumbar spine     3. Chronic pain of both knees     4. Gastroesophageal reflux disease without esophagitis     5. Raynaud's phenomenon without gangrene           Plan:       Current Outpatient Medications   Medication Sig Dispense Refill    esomeprazole (NEXIUM) 40 MG delayed release capsule TAKE 1 CAPSULE BY MOUTH TWICE A DAY 60 capsule 5    traMADol (ULTRAM) 50 MG tablet Take 2 tablets by mouth three times daily for 30 days. 180 tablet 0    furosemide (LASIX) 20 MG tablet TAKE 1 TABLET BY MOUTH DAILY AS NEEDED (LEG EDEMA) 60 tablet 1    ipratropium (ATROVENT) 0.06 % nasal spray 1 spray by Nasal route daily 1 Bottle 5    oxaprozin (DAYPRO) 600 MG tablet TAKE 1 TABLET BY MOUTH TWICE A DAY 60 tablet 5    sucralfate (CARAFATE) 1 GM tablet TAKE 1 TABLET BY MOUTH 4 TIMES A  tablet 1    potassium chloride (KLOR-CON M10) 10 MEQ extended release tablet TAKE 1 TABLET BY MOUTH EVERY DAY (Patient taking differently: TAKE 1 TABLET BY MOUTH EVERY OTHER DAY) 30 tablet 5    zinc gluconate 50 MG tablet Take 50 mg by mouth daily      hydroCHLOROthiazide (HYDRODIURIL) 25 MG tablet TAKE 1 TABLET BY MOUTH EVERY OTHER DAY 45 tablet 3    diclofenac sodium (VOLTAREN) 1 % GEL APPLY 2 G TOPICALLY 4 TIMES DAILY 100 g 5    gabapentin (NEURONTIN) 300 MG capsule TAKE 1 CAPSULE BY MOUTH 2 TIMES DAILY 60 capsule 5    ferrous sulfate 325 (65 Fe) MG tablet Take 325 mg by mouth daily (with breakfast)       metaxalone (SKELAXIN) 800 MG tablet TAKE 1 TABLET BY MOUTH 3 TIMES DAILY 90 tablet 3    nitroGLYCERIN (NITROLINGUAL) 0.4 MG/SPRAY spray Place 1 spray under the tongue every 5 minutes as needed for Chest pain. 1 Bottle 1    Cholecalciferol (VITAMIN D3) 3000 UNITS TABS Take 5,000 Units by mouth daily.  folic acid (FOLVITE) 1 MG tablet Take 1 mg by mouth daily.         estrogens, conjugated, (PREMARIN) 0.9 MG tablet Take 0.9 mg by mouth daily.        Ascorbic Acid (VITAMIN C) 500 MG tablet Take 1,000 mg by mouth daily. No current facility-administered medications for this visit. No orders of the defined types were placed in this encounter.     Labs  Next  Week       Chronic  Tramadol and  Tolerates     Rosibel Alonzo MD

## 2021-12-18 ENCOUNTER — NURSE ONLY (OUTPATIENT)
Dept: LAB | Age: 66
End: 2021-12-18

## 2021-12-18 DIAGNOSIS — I10 ESSENTIAL HYPERTENSION: ICD-10-CM

## 2021-12-18 LAB
ANION GAP SERPL CALCULATED.3IONS-SCNC: 12 MEQ/L (ref 8–16)
BUN BLDV-MCNC: 26 MG/DL (ref 7–22)
CALCIUM SERPL-MCNC: 9.2 MG/DL (ref 8.5–10.5)
CHLORIDE BLD-SCNC: 99 MEQ/L (ref 98–111)
CHOLESTEROL, TOTAL: 276 MG/DL (ref 100–199)
CO2: 31 MEQ/L (ref 23–33)
CREAT SERPL-MCNC: 0.7 MG/DL (ref 0.4–1.2)
FERRITIN: 71 NG/ML (ref 10–291)
GFR SERPL CREATININE-BSD FRML MDRD: 84 ML/MIN/1.73M2
GLUCOSE BLD-MCNC: 87 MG/DL (ref 70–108)
HDLC SERPL-MCNC: 101 MG/DL
IRON: 71 UG/DL (ref 50–170)
LDL CHOLESTEROL CALCULATED: 155 MG/DL
POTASSIUM SERPL-SCNC: 3.4 MEQ/L (ref 3.5–5.2)
SODIUM BLD-SCNC: 142 MEQ/L (ref 135–145)
TOTAL IRON BINDING CAPACITY: 397 UG/DL (ref 171–450)
TRIGL SERPL-MCNC: 102 MG/DL (ref 0–199)
TSH SERPL DL<=0.05 MIU/L-ACNC: 1.87 UIU/ML (ref 0.4–4.2)

## 2021-12-20 ENCOUNTER — TELEPHONE (OUTPATIENT)
Dept: FAMILY MEDICINE CLINIC | Age: 66
End: 2021-12-20

## 2021-12-20 NOTE — TELEPHONE ENCOUNTER
----- Message from Leonardo Kunz MD sent at 12/19/2021  3:43 PM EST -----  Chol a little higher so diet and potassium slight  low at  3.4 and chol [id number]    Please call

## 2021-12-22 LAB — SOLUBLE TRANSFERRIN RECEPT: 3.4 MG/L (ref 1.9–4.4)

## 2021-12-23 DIAGNOSIS — M47.812 DEGENERATIVE JOINT DISEASE OF CERVICAL AND LUMBAR SPINE: Chronic | ICD-10-CM

## 2021-12-23 DIAGNOSIS — M47.816 DEGENERATIVE JOINT DISEASE OF CERVICAL AND LUMBAR SPINE: Chronic | ICD-10-CM

## 2021-12-23 DIAGNOSIS — M25.561 CHRONIC PAIN OF BOTH KNEES: ICD-10-CM

## 2021-12-23 DIAGNOSIS — M25.562 CHRONIC PAIN OF BOTH KNEES: ICD-10-CM

## 2021-12-23 DIAGNOSIS — G89.29 CHRONIC PAIN OF BOTH KNEES: ICD-10-CM

## 2021-12-23 NOTE — TELEPHONE ENCOUNTER
Date of last visit:  12/6/2021  Date of next visit:  3/14/2022    Requested Prescriptions     Pending Prescriptions Disp Refills    traMADol (ULTRAM) 50 MG tablet 180 tablet 0     Sig: Take 2 tablets by mouth three times daily for 30 days.

## 2021-12-24 RX ORDER — TRAMADOL HYDROCHLORIDE 50 MG/1
100 TABLET ORAL 3 TIMES DAILY
Qty: 180 TABLET | Refills: 0 | Status: SHIPPED | OUTPATIENT
Start: 2021-12-24 | End: 2022-01-27 | Stop reason: SDUPTHER

## 2022-01-03 DIAGNOSIS — M47.812 DEGENERATIVE JOINT DISEASE OF CERVICAL AND LUMBAR SPINE: Chronic | ICD-10-CM

## 2022-01-03 DIAGNOSIS — I10 ESSENTIAL HYPERTENSION: ICD-10-CM

## 2022-01-03 DIAGNOSIS — M47.816 DEGENERATIVE JOINT DISEASE OF CERVICAL AND LUMBAR SPINE: Chronic | ICD-10-CM

## 2022-01-04 NOTE — TELEPHONE ENCOUNTER
Date of last visit:  12/6/2021  Date of next visit:  3/14/2022    Requested Prescriptions     Pending Prescriptions Disp Refills    gabapentin (NEURONTIN) 300 MG capsule [Pharmacy Med Name: GABAPENTIN 300 MG CAPSULE] 60 capsule 5     Sig: TAKE 1 CAPSULE BY MOUTH 2 TIMES DAILY    potassium chloride (KLOR-CON M10) 10 MEQ extended release tablet [Pharmacy Med Name: KLOR-CON M10 TABLET] 90 tablet 1     Sig: TAKE 1 TABLET BY MOUTH EVERY OTHER DAY

## 2022-01-05 ENCOUNTER — TELEPHONE (OUTPATIENT)
Dept: FAMILY MEDICINE CLINIC | Age: 67
End: 2022-01-05

## 2022-01-05 RX ORDER — GABAPENTIN 300 MG/1
CAPSULE ORAL
Qty: 60 CAPSULE | Refills: 5 | Status: SHIPPED | OUTPATIENT
Start: 2022-01-05 | End: 2022-09-19

## 2022-01-05 RX ORDER — POTASSIUM CHLORIDE 750 MG/1
TABLET, EXTENDED RELEASE ORAL
Qty: 90 TABLET | Refills: 1 | Status: SHIPPED | OUTPATIENT
Start: 2022-01-05 | End: 2022-06-13 | Stop reason: SDUPTHER

## 2022-01-05 RX ORDER — PREDNISONE 20 MG/1
TABLET ORAL
Qty: 24 TABLET | Refills: 0 | Status: SHIPPED | OUTPATIENT
Start: 2022-01-05 | End: 2022-03-14 | Stop reason: ALTCHOICE

## 2022-01-05 NOTE — TELEPHONE ENCOUNTER
Patient stated her Arthiritis is acting up, she is having a lot of Generalized Body Aches and is requesting PDN to [facility] on [address].      Date of last visit:  12/6/2021  Date of next visit:  3/14/2022    Requested Prescriptions     Signed Prescriptions Disp Refills    predniSONE (DELTASONE) 20 MG tablet 24 tablet 0     Sig: Take 1 tablet, PO TID x 3 days then BID x 5 days then daily x 5 days     Authorizing Provider: Rena Robertson     Ordering User: Neil KINCAID [facility] [address]

## 2022-01-19 ENCOUNTER — TELEPHONE (OUTPATIENT)
Dept: FAMILY MEDICINE CLINIC | Age: 67
End: 2022-01-19

## 2022-01-19 NOTE — TELEPHONE ENCOUNTER
Outcome: Approved today  PA Case: [id number], Status: Approved, Coverage Starts on: 1/1/2022 12:00:00 AM, Coverage Ends on: 1/19/2023 12:00:00 AM.

## 2022-01-19 NOTE — TELEPHONE ENCOUNTER
Laura SOLIZ Case ID: [id number]  Need help?  Call us at [phone]  Status: Sent to Plan today  Drug: NEXIUM 40 MG dr capsules  Form: [employer] Electronic PA Form ([id number] NCPDP)

## 2022-01-26 DIAGNOSIS — G89.29 CHRONIC PAIN OF BOTH KNEES: ICD-10-CM

## 2022-01-26 DIAGNOSIS — M25.561 CHRONIC PAIN OF BOTH KNEES: ICD-10-CM

## 2022-01-26 DIAGNOSIS — M47.812 DEGENERATIVE JOINT DISEASE OF CERVICAL AND LUMBAR SPINE: Chronic | ICD-10-CM

## 2022-01-26 DIAGNOSIS — M25.562 CHRONIC PAIN OF BOTH KNEES: ICD-10-CM

## 2022-01-26 DIAGNOSIS — M47.816 DEGENERATIVE JOINT DISEASE OF CERVICAL AND LUMBAR SPINE: Chronic | ICD-10-CM

## 2022-01-27 RX ORDER — TRAMADOL HYDROCHLORIDE 50 MG/1
100 TABLET ORAL 3 TIMES DAILY
Qty: 180 TABLET | Refills: 0 | Status: SHIPPED | OUTPATIENT
Start: 2022-01-27 | End: 2022-03-01 | Stop reason: SDUPTHER

## 2022-01-29 DIAGNOSIS — J30.1 NON-SEASONAL ALLERGIC RHINITIS DUE TO POLLEN: ICD-10-CM

## 2022-01-31 NOTE — TELEPHONE ENCOUNTER
Date of last visit:  12/6/2021  Date of next visit:  3/14/2022    Requested Prescriptions     Pending Prescriptions Disp Refills    ipratropium (ATROVENT) 0.06 % nasal spray [Pharmacy Med Name: IPRATROPIUM 0.06% SPRAY]  5     Sig: USE 1 SPRAY BY NASAL ROUTE DAILY

## 2022-02-02 RX ORDER — IPRATROPIUM BROMIDE 42 UG/1
SPRAY, METERED NASAL
Qty: 2 EACH | Refills: 5 | Status: SHIPPED | OUTPATIENT
Start: 2022-02-02

## 2022-02-04 DIAGNOSIS — M47.812 DEGENERATIVE JOINT DISEASE OF CERVICAL AND LUMBAR SPINE: Chronic | ICD-10-CM

## 2022-02-04 DIAGNOSIS — M47.816 DEGENERATIVE JOINT DISEASE OF CERVICAL AND LUMBAR SPINE: Chronic | ICD-10-CM

## 2022-02-07 NOTE — TELEPHONE ENCOUNTER
Date of last visit:  12/6/2021  Date of next visit:  3/14/2022    Requested Prescriptions     Pending Prescriptions Disp Refills    oxaprozin (DAYPRO) 600 MG tablet [Pharmacy Med Name: OXAPROZIN 600 MG TABLET] 60 tablet 5     Sig: TAKE 1 TABLET BY MOUTH TWICE A DAY

## 2022-02-28 DIAGNOSIS — M25.561 CHRONIC PAIN OF BOTH KNEES: ICD-10-CM

## 2022-02-28 DIAGNOSIS — M47.816 DEGENERATIVE JOINT DISEASE OF CERVICAL AND LUMBAR SPINE: Chronic | ICD-10-CM

## 2022-02-28 DIAGNOSIS — G89.29 CHRONIC PAIN OF BOTH KNEES: ICD-10-CM

## 2022-02-28 DIAGNOSIS — M25.562 CHRONIC PAIN OF BOTH KNEES: ICD-10-CM

## 2022-02-28 DIAGNOSIS — M47.812 DEGENERATIVE JOINT DISEASE OF CERVICAL AND LUMBAR SPINE: Chronic | ICD-10-CM

## 2022-03-01 DIAGNOSIS — I10 ESSENTIAL HYPERTENSION: ICD-10-CM

## 2022-03-01 DIAGNOSIS — R60.9 PERIPHERAL EDEMA: ICD-10-CM

## 2022-03-01 RX ORDER — TRAMADOL HYDROCHLORIDE 50 MG/1
100 TABLET ORAL 3 TIMES DAILY
Qty: 180 TABLET | Refills: 0 | Status: SHIPPED | OUTPATIENT
Start: 2022-03-01 | End: 2022-03-30

## 2022-03-01 RX ORDER — FUROSEMIDE 20 MG/1
TABLET ORAL
Qty: 60 TABLET | Refills: 1 | Status: SHIPPED | OUTPATIENT
Start: 2022-03-01 | End: 2022-03-14 | Stop reason: SDUPTHER

## 2022-03-01 NOTE — TELEPHONE ENCOUNTER
Date of last visit:  12/6/2021  Date of next visit:  3/14/2022    Requested Prescriptions     Pending Prescriptions Disp Refills    furosemide (LASIX) 20 MG tablet [Pharmacy Med Name: FUROSEMIDE 20 MG TABLET] 60 tablet 1     Sig: TAKE 1 TABLET BY MOUTH DAILY AS NEEDED (LEG EDEMA)

## 2022-03-07 ENCOUNTER — OFFICE VISIT (OUTPATIENT)
Dept: CARDIOLOGY CLINIC | Age: 67
End: 2022-03-07
Payer: MEDICARE

## 2022-03-07 VITALS
HEIGHT: 66 IN | BODY MASS INDEX: 26.52 KG/M2 | WEIGHT: 165 LBS | HEART RATE: 88 BPM | DIASTOLIC BLOOD PRESSURE: 82 MMHG | SYSTOLIC BLOOD PRESSURE: 176 MMHG

## 2022-03-07 DIAGNOSIS — E78.01 FAMILIAL HYPERCHOLESTEROLEMIA: ICD-10-CM

## 2022-03-07 DIAGNOSIS — I10 ESSENTIAL HYPERTENSION: ICD-10-CM

## 2022-03-07 DIAGNOSIS — I10 PRIMARY HYPERTENSION: Primary | ICD-10-CM

## 2022-03-07 DIAGNOSIS — R06.02 SOB (SHORTNESS OF BREATH): ICD-10-CM

## 2022-03-07 PROCEDURE — G8427 DOCREV CUR MEDS BY ELIG CLIN: HCPCS | Performed by: NUCLEAR MEDICINE

## 2022-03-07 PROCEDURE — 1090F PRES/ABSN URINE INCON ASSESS: CPT | Performed by: NUCLEAR MEDICINE

## 2022-03-07 PROCEDURE — 99213 OFFICE O/P EST LOW 20 MIN: CPT | Performed by: NUCLEAR MEDICINE

## 2022-03-07 PROCEDURE — 3017F COLORECTAL CA SCREEN DOC REV: CPT | Performed by: NUCLEAR MEDICINE

## 2022-03-07 PROCEDURE — 1036F TOBACCO NON-USER: CPT | Performed by: NUCLEAR MEDICINE

## 2022-03-07 PROCEDURE — G8417 CALC BMI ABV UP PARAM F/U: HCPCS | Performed by: NUCLEAR MEDICINE

## 2022-03-07 PROCEDURE — G8400 PT W/DXA NO RESULTS DOC: HCPCS | Performed by: NUCLEAR MEDICINE

## 2022-03-07 PROCEDURE — 4040F PNEUMOC VAC/ADMIN/RCVD: CPT | Performed by: NUCLEAR MEDICINE

## 2022-03-07 PROCEDURE — G8482 FLU IMMUNIZE ORDER/ADMIN: HCPCS | Performed by: NUCLEAR MEDICINE

## 2022-03-07 PROCEDURE — 1123F ACP DISCUSS/DSCN MKR DOCD: CPT | Performed by: NUCLEAR MEDICINE

## 2022-03-07 RX ORDER — HYDROCHLOROTHIAZIDE 25 MG/1
25 TABLET ORAL EVERY OTHER DAY
Qty: 45 TABLET | Refills: 3 | Status: SHIPPED | OUTPATIENT
Start: 2022-03-07 | End: 2022-04-04

## 2022-03-07 NOTE — PROGRESS NOTES
[address] [place] [address].  [address]  [facility] [address]  Dept: [phone]  Dept Fax: [phone]  Loc: [phone]    Visit Date: 3/7/2022    Gabby Elder is a 73 y.o. female who presents todayfor:  Chief Complaint   Patient presents with    Check-Up    Hypertension    Hyperlipidemia   getting work up for anemia   She is better  No chest pain   ?/ murmur heard by GI   Some more dyspnea  Higher BP all times  Issues with multiple meds  No dizziness  No syncope        HPI:  HPI  Past Medical History:   Diagnosis Date    Acid reflux     Arthritis     [facility] ([facility]     Cancer ([facility] 1989    cervical    Gallbladder disease 2010    History of scarlet fever     Hyperlipidemia     Hypertension     Joint pain     Medication intolerance     VERY SENSITIVE TO MEDICATIONS    Medication intolerance     VERY SENSITIVE TO MEDICATIONS     Swelling       Past Surgical History:   Procedure Laterality Date    BACK SURGERY      BACK SURGERY  01/11/2019    Dr Heather Anguiano @ [facility]       L1  to  L5  with    rods     BLADDER REPAIR  2006    CARDIAC CATHETERIZATION  2009     wnl  and  dr. Colleen Crowder TEST  07/30/2009    EF 55% C NO OBVIOUS ISCHEMIA    CARDIOVASCULAR STRESS TEST  09/02/2005    EF 66% C MILD APICAL STRESS INDUCED ISCHEMIA    CERVICAL FUSION  11-22-13    Dr. Heather Anguiano @ [facility], 4-5, 5-6, 6-7     COLONOSCOPY  2018      normal  and good   10 year s   [address]  tahbso    JOINT REPLACEMENT Right 06/13/2017    2nd stage revision     KNEE ARTHROPLASTY  2010    LT    KNEE ARTHROSCOPY  [id number]    KNEE ARTHROSCOPY Right 8-1-14    Meniscus Tear By Dr Thalia Laura @ [facility]     KNEE ARTHROSCOPY Right 9-16-16 and 10-25-16    Dr Thalia Laura @ [address] Right 05/02/2017    Antibiotic Spacer Placement - Dr. Mikel Zhu  1-10-15    Dr Heather Anguiano @ [facility] ARTHROPLASTY Left 2010    analisa    TRANSTHORACIC ECHOCARDIOGRAM  2009    EF 50% MILD LT VENTRICULAR HYPERTROPHY,TRIVIAL MR AND TR, TRIVIAL AORTIC INSUFFICIENCY,DIASTOLIC DYSFUNCTION OF THE LT VENTRICLE    TRANSTHORACIC ECHOCARDIOGRAM  2005    EF 60% C TRACE MR AND MILD TR    UPPER GASTROINTESTINAL ENDOSCOPY      [facility]     Family History   Problem Relation Age of Onset    Heart Disease Mother     Hypertension Mother     Heart Disease Father     Hypertension Father     Stroke Father      Social History     Tobacco Use    Smoking status: Former Smoker     Packs/day: 1.00     Years: 3.00     Pack years: 3.00     Quit date: 1975     Years since quittin.3    Smokeless tobacco: Never Used   Substance Use Topics    Alcohol use: No      Current Outpatient Medications   Medication Sig Dispense Refill    traMADol (ULTRAM) 50 MG tablet Take 2 tablets by mouth three times daily for 30 days.  180 tablet 0    furosemide (LASIX) 20 MG tablet TAKE 1 TABLET BY MOUTH DAILY AS NEEDED (LEG EDEMA) 60 tablet 1    oxaprozin (DAYPRO) 600 MG tablet TAKE 1 TABLET BY MOUTH TWICE A DAY 60 tablet 5    ipratropium (ATROVENT) 0.06 % nasal spray USE 1 SPRAY BY NASAL ROUTE DAILY 2 each 5    gabapentin (NEURONTIN) 300 MG capsule TAKE 1 CAPSULE BY MOUTH 2 TIMES DAILY 60 capsule 5    potassium chloride (KLOR-CON M10) 10 MEQ extended release tablet TAKE 1 TABLET BY MOUTH EVERY OTHER DAY 90 tablet 1    predniSONE (DELTASONE) 20 MG tablet Take 1 tablet, PO TID x 3 days then BID x 5 days then daily x 5 days 24 tablet 0    esomeprazole (NEXIUM) 40 MG delayed release capsule TAKE 1 CAPSULE BY MOUTH TWICE A DAY 60 capsule 5    sucralfate (CARAFATE) 1 GM tablet TAKE 1 TABLET BY MOUTH 4 TIMES A  tablet 1    zinc gluconate 50 MG tablet Take 50 mg by mouth daily      hydroCHLOROthiazide (HYDRODIURIL) 25 MG tablet TAKE 1 TABLET BY MOUTH EVERY OTHER DAY 45 tablet 3    diclofenac sodium (VOLTAREN) 1 % GEL APPLY 2 G TOPICALLY 4 TIMES DAILY 100 g 5    ferrous sulfate 325 (65 Fe) MG tablet Take 325 mg by mouth daily (with breakfast)       metaxalone (SKELAXIN) 800 MG tablet TAKE 1 TABLET BY MOUTH 3 TIMES DAILY 90 tablet 3    nitroGLYCERIN (NITROLINGUAL) 0.4 MG/SPRAY spray Place 1 spray under the tongue every 5 minutes as needed for Chest pain. 1 Bottle 1    Cholecalciferol (VITAMIN D3) 3000 UNITS TABS Take 5,000 Units by mouth daily.  folic acid (FOLVITE) 1 MG tablet Take 1 mg by mouth daily.  estrogens, conjugated, (PREMARIN) 0.9 MG tablet Take 0.9 mg by mouth daily.  Ascorbic Acid (VITAMIN C) 500 MG tablet Take 1,000 mg by mouth daily. No current facility-administered medications for this visit.      Allergies   Allergen Reactions    Aldactone [Spironolactone] Other (See Comments)     CAUSES HEADACHES    Amlodipine Besy-Benazepril Hcl Other (See Comments)     CAUSES JOINT PAIN    Amlodipine-Atorvastatin Other (See Comments)     CAUSES INCREASED CRP     Bactrim     Cardizem [Diltiazem Hcl] Other (See Comments)     CAUSES LETHARGY AND LIGHT HEADED    Feldene [Piroxicam]     Motrin [Ibuprofen Micronized]     Naproxen     Potassium-Containing Compounds Other (See Comments)     CAUSES ACID REFLUX    Statins [Statins]      muscle    Vioxx     Zetia [Ezetimibe] Nausea Only and Other (See Comments)     CAUSES REFLUX    Macrobid [Nitrofurantoin] Nausea And Vomiting    Plaquenil [Hydroxychloroquine Sulfate] Rash     Health Maintenance   Topic Date Due    DTaP/Tdap/Td vaccine (1 - Tdap) Never done    Shingles Vaccine (1 of 2) Never done   [employer] Visit (AWV)  Never done    Pneumococcal 65+ years Vaccine (1 of 1 - PPSV23) Never done    Depression Screen  03/10/2022    Potassium monitoring  12/18/2022    Creatinine monitoring  02/01/2023    Breast cancer screen  03/02/2023    Lipid screen  12/18/2026    Colorectal Cancer Screen  11/06/2028    DEXA (modify frequency per FRAX score)  Completed    Flu vaccine  Completed    COVID-19 Vaccine  Completed    Hepatitis C screen  Completed    HIV screen  Completed    Hepatitis A vaccine  Aged Out    Hepatitis B vaccine  Aged Out    Hib vaccine  Aged Out    Meningococcal (ACWY) vaccine  Aged Out       Subjective:  Review of Systems  General:   No fever, no chills, No fatigue or weight loss  Pulmonary:    No dyspnea, no wheezing  Cardiac:    Denies recent chest pain,   GI:     No nausea or vomiting, no abdominal pain  Neuro:    No dizziness or light headedness,   Musculoskeletal:  No recent active issues  Extremities:   No edema, no obvious claudication       Objective:  Physical Exam  BP (!) 176/82   Pulse 88   Ht 5' 6\" (1.676 m)   Wt 165 lb (74.8 kg)   BMI 26.63 kg/m²   General:   Well developed, well nourished  Lungs:   Clear to auscultation  Heart:    Normal S1 S2, Slight murmur. no rubs, no gallops  Abdomen:   Soft, non tender, no organomegalies, positive bowel sounds  Extremities:   No edema, no cyanosis, good peripheral pulses  Neurological:   Awake, alert, oriented. No obvious focal deficits  Musculoskelatal:  No obvious deformities    Assessment:      Diagnosis Orders   1. Primary hypertension     2. Familial hypercholesterolemia     as above  Cardiac fair for now       Plan:  No follow-ups on file. As above  Continue risk factor modification and medical management  Thank you for allowing me to participate in the care of your patient. Please don't hesitate to contact me regarding any further issues related to the patient care    Orders Placed:  No orders of the defined types were placed in this encounter. Medications Prescribed:  No orders of the defined types were placed in this encounter. Discussed use, benefit, and side effects of prescribed medications. All patient questions answered. Pt voicedunderstanding. Instructed to continue current medications, diet and exercise.  Continue risk factor modification and medical management. Patient agreed with treatment plan. Follow up as directed.     Electronically signedby Nancy Godfrey MD on 3/7/2022 at 7:55 AM

## 2022-03-14 ENCOUNTER — HOSPITAL ENCOUNTER (OUTPATIENT)
Dept: NON INVASIVE DIAGNOSTICS | Age: 67
Discharge: HOME | End: 2022-03-14
Payer: MEDICARE

## 2022-03-14 ENCOUNTER — OFFICE VISIT (OUTPATIENT)
Dept: FAMILY MEDICINE CLINIC | Age: 67
End: 2022-03-14

## 2022-03-14 VITALS
SYSTOLIC BLOOD PRESSURE: 140 MMHG | TEMPERATURE: 95 F | HEIGHT: 66 IN | DIASTOLIC BLOOD PRESSURE: 92 MMHG | WEIGHT: 165.13 LBS | HEART RATE: 76 BPM | RESPIRATION RATE: 16 BRPM | BODY MASS INDEX: 26.54 KG/M2

## 2022-03-14 DIAGNOSIS — R60.9 PERIPHERAL EDEMA: ICD-10-CM

## 2022-03-14 DIAGNOSIS — R06.02 SOB (SHORTNESS OF BREATH): ICD-10-CM

## 2022-03-14 DIAGNOSIS — I73.00 RAYNAUD'S PHENOMENON WITHOUT GANGRENE: ICD-10-CM

## 2022-03-14 DIAGNOSIS — M25.561 CHRONIC PAIN OF BOTH KNEES: ICD-10-CM

## 2022-03-14 DIAGNOSIS — I10 ESSENTIAL HYPERTENSION: Primary | ICD-10-CM

## 2022-03-14 DIAGNOSIS — M19.90 ARTHRITIS: ICD-10-CM

## 2022-03-14 DIAGNOSIS — M47.812 DEGENERATIVE JOINT DISEASE OF CERVICAL AND LUMBAR SPINE: Chronic | ICD-10-CM

## 2022-03-14 DIAGNOSIS — I10 ESSENTIAL HYPERTENSION: ICD-10-CM

## 2022-03-14 DIAGNOSIS — M25.562 CHRONIC PAIN OF BOTH KNEES: ICD-10-CM

## 2022-03-14 DIAGNOSIS — M47.816 DEGENERATIVE JOINT DISEASE OF CERVICAL AND LUMBAR SPINE: Chronic | ICD-10-CM

## 2022-03-14 DIAGNOSIS — K21.9 GASTROESOPHAGEAL REFLUX DISEASE WITHOUT ESOPHAGITIS: ICD-10-CM

## 2022-03-14 DIAGNOSIS — G89.29 CHRONIC PAIN OF BOTH KNEES: ICD-10-CM

## 2022-03-14 DIAGNOSIS — I10 PRIMARY HYPERTENSION: ICD-10-CM

## 2022-03-14 DIAGNOSIS — E78.01 FAMILIAL HYPERCHOLESTEROLEMIA: ICD-10-CM

## 2022-03-14 LAB
LV EF: 55 %
LVEF MODALITY: NORMAL

## 2022-03-14 PROCEDURE — 3017F COLORECTAL CA SCREEN DOC REV: CPT | Performed by: FAMILY MEDICINE

## 2022-03-14 PROCEDURE — 93306 TTE W/DOPPLER COMPLETE: CPT

## 2022-03-14 PROCEDURE — 1090F PRES/ABSN URINE INCON ASSESS: CPT | Performed by: FAMILY MEDICINE

## 2022-03-14 PROCEDURE — 99213 OFFICE O/P EST LOW 20 MIN: CPT | Performed by: FAMILY MEDICINE

## 2022-03-14 PROCEDURE — G8417 CALC BMI ABV UP PARAM F/U: HCPCS | Performed by: FAMILY MEDICINE

## 2022-03-14 PROCEDURE — G8400 PT W/DXA NO RESULTS DOC: HCPCS | Performed by: FAMILY MEDICINE

## 2022-03-14 PROCEDURE — G8427 DOCREV CUR MEDS BY ELIG CLIN: HCPCS | Performed by: FAMILY MEDICINE

## 2022-03-14 PROCEDURE — 1036F TOBACCO NON-USER: CPT | Performed by: FAMILY MEDICINE

## 2022-03-14 PROCEDURE — 1123F ACP DISCUSS/DSCN MKR DOCD: CPT | Performed by: FAMILY MEDICINE

## 2022-03-14 RX ORDER — FUROSEMIDE 20 MG/1
TABLET ORAL
Qty: 60 TABLET | Refills: 1 | Status: SHIPPED | OUTPATIENT
Start: 2022-03-14

## 2022-03-14 ASSESSMENT — ENCOUNTER SYMPTOMS
WHEEZING: 0
ABDOMINAL PAIN: 0
HOARSE VOICE: 0
COUGH: 0
SHORTNESS OF BREATH: 0
SORE THROAT: 0
EYE PAIN: 0
CHEST TIGHTNESS: 0
CONSTIPATION: 0
NAUSEA: 0
ORTHOPNEA: 0

## 2022-03-14 ASSESSMENT — PATIENT HEALTH QUESTIONNAIRE - PHQ9
1. LITTLE INTEREST OR PLEASURE IN DOING THINGS: 0
SUM OF ALL RESPONSES TO PHQ QUESTIONS 1-9: 0
SUM OF ALL RESPONSES TO PHQ QUESTIONS 1-9: 0
SUM OF ALL RESPONSES TO PHQ9 QUESTIONS 1 & 2: 0
2. FEELING DOWN, DEPRESSED OR HOPELESS: 0
SUM OF ALL RESPONSES TO PHQ QUESTIONS 1-9: 0
SUM OF ALL RESPONSES TO PHQ QUESTIONS 1-9: 0

## 2022-03-14 NOTE — PROGRESS NOTES
Subjective:      Patient ID: Percy Moore is a 73 y.o. female. Cardiac  Echo       Knee  Stiffness   And  Back pain    Tramadol and   For  Knee and   Chronic back pain        Leg  Edema  Noted           Hypertension  This is a chronic problem. The current episode started more than 1 year ago. The problem has been resolved since onset. The problem is controlled. Pertinent negatives include no chest pain, headaches, neck pain, orthopnea, palpitations, peripheral edema or shortness of breath. The current treatment provides significant improvement. There are no compliance problems. Gastroesophageal Reflux  She reports no abdominal pain, no chest pain, no coughing, no hoarse voice, no nausea, no sore throat or no wheezing. This is a chronic problem. The current episode started more than 1 year ago. The problem occurs rarely. The problem has been resolved. The symptoms are aggravated by certain foods. Pertinent negatives include no fatigue. She has tried a PPI for the symptoms. The treatment provided significant relief. Past Medical History:   Diagnosis Date    Acid reflux     Arthritis     Bleeding tendency (H[facility])     [facility]    cervical    Gallbladder disease 2010    History of scarlet fever     Hyperlipidemia     Hypertension     Joint pain     Medication intolerance     VERY SENSITIVE TO MEDICATIONS    Medication intolerance     VERY SENSITIVE TO MEDICATIONS     Swelling      Review of Systems   Constitutional: Negative for appetite change, fatigue and fever. HENT: Negative for congestion, ear pain, hoarse voice, postnasal drip and sore throat. Eyes: Negative for pain and visual disturbance. Respiratory: Negative for cough, chest tightness, shortness of breath and wheezing. Cardiovascular: Negative for chest pain, palpitations, orthopnea and leg swelling. Gastrointestinal: Negative for abdominal pain, constipation and nausea.    Genitourinary: Negative for dysuria and frequency. Musculoskeletal: Negative for arthralgias, joint swelling, neck pain and neck stiffness. Skin: Negative for rash. Neurological: Negative for dizziness, weakness, numbness and headaches. Hematological: Negative for adenopathy. Does not bruise/bleed easily. Psychiatric/Behavioral: Negative for behavioral problems and sleep disturbance. The patient is not nervous/anxious. BP (!) 140/92 (Site: Right Upper Arm, Position: Sitting, Cuff Size: Medium Adult)   Pulse 76   Temp 95 °F (35 °C) (Infrared)   Resp 16   Ht 5' 6\" (1.676 m)   Wt 165 lb 2 oz (74.9 kg)   BMI 26.65 kg/m²   Objective:   Physical Exam  Vitals and nursing note reviewed. Constitutional:       Appearance: She is well-developed. HENT:      Head: Normocephalic and atraumatic. Right Ear: External ear normal.      Left Ear: External ear normal.      Nose: Nose normal.   Eyes:      General: No scleral icterus. Conjunctiva/sclera: Conjunctivae normal.      Pupils: Pupils are equal, round, and reactive to light. Neck:      Thyroid: No thyromegaly. Vascular: No JVD. Cardiovascular:      Rate and Rhythm: Normal rate and regular rhythm. Heart sounds: Murmur ( 1 to 2/ [place]) heard. Pulmonary:      Effort: Pulmonary effort is normal.      Breath sounds: Normal breath sounds. No wheezing or rales. Abdominal:      General: Bowel sounds are normal. There is no distension. Palpations: Abdomen is soft. There is no mass. Tenderness: There is no abdominal tenderness. Musculoskeletal:         General: No tenderness. Normal range of motion. Cervical back: Normal range of motion and neck supple. Lymphadenopathy:      Cervical: No cervical adenopathy. Skin:     General: Skin is warm and dry. Findings: No rash. Neurological:      Mental Status: She is alert and oriented to person, place, and time. Cranial Nerves: No cranial nerve deficit.       Deep Tendon Reflexes: Reflexes are normal and symmetric. Assessment:       Diagnosis Orders   1. Essential hypertension     2. Peripheral edema     3. Chronic pain of both knees     4. Gastroesophageal reflux disease without esophagitis     5. Raynaud's phenomenon without gangrene     6. Arthritis     7. Degenerative joint disease of cervical and lumbar spine           Plan:            Current Outpatient Medications   Medication Sig Dispense Refill    hydroCHLOROthiazide (HYDRODIURIL) 25 MG tablet Take 1 tablet by mouth every other day 45 tablet 3    traMADol (ULTRAM) 50 MG tablet Take 2 tablets by mouth three times daily for 30 days. 180 tablet 0    furosemide (LASIX) 20 MG tablet TAKE 1 TABLET BY MOUTH DAILY AS NEEDED (LEG EDEMA) 60 tablet 1    oxaprozin (DAYPRO) 600 MG tablet TAKE 1 TABLET BY MOUTH TWICE A DAY 60 tablet 5    ipratropium (ATROVENT) 0.06 % nasal spray USE 1 SPRAY BY NASAL ROUTE DAILY 2 each 5    gabapentin (NEURONTIN) 300 MG capsule TAKE 1 CAPSULE BY MOUTH 2 TIMES DAILY 60 capsule 5    potassium chloride (KLOR-CON M10) 10 MEQ extended release tablet TAKE 1 TABLET BY MOUTH EVERY OTHER DAY 90 tablet 1    esomeprazole (NEXIUM) 40 MG delayed release capsule TAKE 1 CAPSULE BY MOUTH TWICE A DAY 60 capsule 5    sucralfate (CARAFATE) 1 GM tablet TAKE 1 TABLET BY MOUTH 4 TIMES A  tablet 1    zinc gluconate 50 MG tablet Take 50 mg by mouth daily      diclofenac sodium (VOLTAREN) 1 % GEL APPLY 2 G TOPICALLY 4 TIMES DAILY 100 g 5    ferrous sulfate 325 (65 Fe) MG tablet Take 325 mg by mouth daily (with breakfast)       metaxalone (SKELAXIN) 800 MG tablet TAKE 1 TABLET BY MOUTH 3 TIMES DAILY 90 tablet 3    nitroGLYCERIN (NITROLINGUAL) 0.4 MG/SPRAY spray Place 1 spray under the tongue every 5 minutes as needed for Chest pain. 1 Bottle 1    Cholecalciferol (VITAMIN D3) 3000 UNITS TABS Take 5,000 Units by mouth daily.  folic acid (FOLVITE) 1 MG tablet Take 1 mg by mouth daily.         estrogens, conjugated, (PREMARIN) 0.9 MG tablet Take 0.9 mg by mouth daily.  Ascorbic Acid (VITAMIN C) 500 MG tablet Take 1,000 mg by mouth daily. No current facility-administered medications for this visit. No orders of the defined types were placed in this encounter.         See in   3  mths        Chronic     Tramadol  For  Joint pain       Echo  For  Cardiac  panel    Kinsey Key MD

## 2022-03-15 ENCOUNTER — TELEPHONE (OUTPATIENT)
Dept: CARDIOLOGY CLINIC | Age: 67
End: 2022-03-15

## 2022-03-29 DIAGNOSIS — G89.29 CHRONIC PAIN OF BOTH KNEES: ICD-10-CM

## 2022-03-29 DIAGNOSIS — M25.562 CHRONIC PAIN OF BOTH KNEES: ICD-10-CM

## 2022-03-29 DIAGNOSIS — M25.561 CHRONIC PAIN OF BOTH KNEES: ICD-10-CM

## 2022-03-29 DIAGNOSIS — M47.812 DEGENERATIVE JOINT DISEASE OF CERVICAL AND LUMBAR SPINE: Chronic | ICD-10-CM

## 2022-03-29 DIAGNOSIS — M47.816 DEGENERATIVE JOINT DISEASE OF CERVICAL AND LUMBAR SPINE: Chronic | ICD-10-CM

## 2022-03-30 RX ORDER — TRAMADOL HYDROCHLORIDE 50 MG/1
100 TABLET ORAL 3 TIMES DAILY
Qty: 180 TABLET | Refills: 0 | Status: SHIPPED | OUTPATIENT
Start: 2022-03-30 | End: 2022-05-03

## 2022-03-30 NOTE — TELEPHONE ENCOUNTER
The pharmacy is requesting a refill of the below medication which has been pended for you:     Requested Prescriptions     Pending Prescriptions Disp Refills    traMADol (ULTRAM) 50 MG tablet [Pharmacy Med Name: TRAMADOL HCL 50 MG TABLET] 180 tablet 0     Sig: TAKE 2 TABLETS BY MOUTH THREE TIMES DAILY FOR 30 DAYS.        Last Appointment Date: 3/14/2022  Next Appointment Date: 6/13/2022    Allergies   Allergen Reactions    Aldactone [Spironolactone] Other (See Comments)     CAUSES HEADACHES    Amlodipine Besy-Benazepril Hcl Other (See Comments)     CAUSES JOINT PAIN    Amlodipine-Atorvastatin Other (See Comments)     CAUSES INCREASED CRP     Bactrim     Cardizem [Diltiazem Hcl] Other (See Comments)     CAUSES LETHARGY AND LIGHT HEADED    Feldene [Piroxicam]     Motrin [Ibuprofen Micronized]     Naproxen     Potassium-Containing Compounds Other (See Comments)     CAUSES ACID REFLUX    Statins [Statins]      muscle    Vioxx     Zetia [Ezetimibe] Nausea Only and Other (See Comments)     CAUSES REFLUX    Macrobid [Nitrofurantoin] Nausea And Vomiting    Plaquenil [Hydroxychloroquine Sulfate] Rash

## 2022-04-03 DIAGNOSIS — I10 ESSENTIAL HYPERTENSION: ICD-10-CM

## 2022-04-04 RX ORDER — HYDROCHLOROTHIAZIDE 25 MG/1
25 TABLET ORAL EVERY OTHER DAY
Qty: 45 TABLET | Refills: 3 | Status: SHIPPED | OUTPATIENT
Start: 2022-04-04

## 2022-05-02 DIAGNOSIS — M25.561 CHRONIC PAIN OF BOTH KNEES: ICD-10-CM

## 2022-05-02 DIAGNOSIS — M25.562 CHRONIC PAIN OF BOTH KNEES: ICD-10-CM

## 2022-05-02 DIAGNOSIS — M47.816 DEGENERATIVE JOINT DISEASE OF CERVICAL AND LUMBAR SPINE: Chronic | ICD-10-CM

## 2022-05-02 DIAGNOSIS — M47.812 DEGENERATIVE JOINT DISEASE OF CERVICAL AND LUMBAR SPINE: Chronic | ICD-10-CM

## 2022-05-02 DIAGNOSIS — G89.29 CHRONIC PAIN OF BOTH KNEES: ICD-10-CM

## 2022-05-02 NOTE — TELEPHONE ENCOUNTER
Date of last visit:  3/14/2022  Date of next visit:  6/13/2022    Requested Prescriptions     Pending Prescriptions Disp Refills    traMADol (ULTRAM) 50 MG tablet [Pharmacy Med Name: TRAMADOL HCL 50 MG TABLET] 180 tablet 0     Sig: TAKE 2 TABLETS BY MOUTH THREE TIMES DAILY FOR 30 DAYS.

## 2022-05-03 RX ORDER — TRAMADOL HYDROCHLORIDE 50 MG/1
100 TABLET ORAL 3 TIMES DAILY
Qty: 180 TABLET | Refills: 0 | Status: SHIPPED | OUTPATIENT
Start: 2022-05-03 | End: 2022-06-03 | Stop reason: SDUPTHER

## 2022-05-12 ENCOUNTER — HOSPITAL ENCOUNTER (EMERGENCY)
Age: 67
Discharge: HOME | End: 2022-05-12
Attending: EMERGENCY MEDICINE
Payer: MEDICARE

## 2022-05-12 ENCOUNTER — APPOINTMENT (OUTPATIENT)
Dept: GENERAL RADIOLOGY | Age: 67
End: 2022-05-12
Payer: MEDICARE

## 2022-05-12 VITALS
RESPIRATION RATE: 20 BRPM | WEIGHT: 162 LBS | HEART RATE: 93 BPM | HEIGHT: 66 IN | TEMPERATURE: 97.9 F | BODY MASS INDEX: 26.03 KG/M2 | OXYGEN SATURATION: 96 %

## 2022-05-12 DIAGNOSIS — S81.801A WOUND OF RIGHT LOWER EXTREMITY, INITIAL ENCOUNTER: Primary | ICD-10-CM

## 2022-05-12 PROCEDURE — 73590 X-RAY EXAM OF LOWER LEG: CPT

## 2022-05-12 PROCEDURE — 90715 TDAP VACCINE 7 YRS/> IM: CPT

## 2022-05-12 PROCEDURE — 6360000002 HC RX W HCPCS

## 2022-05-12 PROCEDURE — 99284 EMERGENCY DEPT VISIT MOD MDM: CPT

## 2022-05-12 PROCEDURE — 90471 IMMUNIZATION ADMIN: CPT

## 2022-05-12 RX ADMIN — TETANUS TOXOID, REDUCED DIPHTHERIA TOXOID AND ACELLULAR PERTUSSIS VACCINE, ADSORBED 0.5 ML: 5; 2.5; 8; 8; 2.5 SUSPENSION INTRAMUSCULAR at 19:59

## 2022-05-12 NOTE — ED PROVIDER NOTES
[address] [address]          Pt Name: Yina Castillo  MRN: [id number]  [place] 1955  Date of evaluation: 5/12/2022  Treating Resident Physician: Gianna Arana DO  Supervising Physician: Dr. Poonam Douglass       Chief Complaint   Patient presents with    Leg Injury     History obtained from the patient. HISTORY OF PRESENT ILLNESS    HPI  Paradise Lombardi is a 73 y.o. female who presents to the emergency department for evaluation of leg wound. Patient states she was moving tree limbs at home when one shifted from her control and hit her in the leg. Denies head trauma, no excessive bleeding. Small puncture wound noted to L lateral leg. Patient denies any CP, SOB, ab pain. The patient has no other acute complaints at this time.            REVIEW OF SYSTEMS   Review of Systems      PAST MEDICAL AND SURGICAL HISTORY     Past Medical History:   Diagnosis Date    Acid reflux     Arthritis     Bleeding tendency ([address]     Cancer ([facility] [address] 1989    cervical    Gallbladder disease 2010    History of scarlet fever     Hyperlipidemia     Hypertension     Joint pain     Medication intolerance     VERY SENSITIVE TO MEDICATIONS    Medication intolerance     VERY SENSITIVE TO MEDICATIONS     Swelling      Past Surgical History:   Procedure Laterality Date    BACK SURGERY      BACK SURGERY  01/11/2019    Dr Flavio Live @ [facility]       L1  to  L5  with    rods     BLADDER REPAIR  2006    CARDIAC CATHETERIZATION  2009     [facility]  and  dr. Carol Kendall TEST  07/30/2009    EF 55% C NO OBVIOUS ISCHEMIA    CARDIOVASCULAR STRESS TEST  09/02/2005    EF 66% C MILD APICAL STRESS INDUCED ISCHEMIA    CERVICAL FUSION  11-22-13    Dr. Flavio Live @ [facility], 4-5, 5-6, 6-7     COLONOSCOPY  2018      normal  and good   10 year s   [address]  [place]    JOINT REPLACEMENT Right 06/13/2017    2nd stage revision     KNEE ARTHROPLASTY  2010 LT    KNEE ARTHROSCOPY  [id number]    KNEE ARTHROSCOPY Right 8-1-14    Meniscus Tear By Dr Keon Ortega @ [facility]     KNEE ARTHROSCOPY Right 9-16-16 and 10-25-16    Dr Keon Ortega @ [address] Right 05/02/2017    Antibiotic Spacer Placement - Dr. Sagastume Alert  1-10-15    Dr Mehta Drain @ [address] Left 01/2010    analisa    TRANSTHORACIC ECHOCARDIOGRAM  07/30/2009    EF 50% MILD LT VENTRICULAR HYPERTROPHY,TRIVIAL MR AND TR, TRIVIAL AORTIC INSUFFICIENCY,DIASTOLIC DYSFUNCTION OF THE LT VENTRICLE    TRANSTHORACIC ECHOCARDIOGRAM  08/25/2005    EF 60% C TRACE MR AND MILD TR    UPPER GASTROINTESTINAL ENDOSCOPY  2000    [facility]         MEDICATIONS   No current facility-administered medications for this encounter.     Current Outpatient Medications:     traMADol (ULTRAM) 50 MG tablet, TAKE 2 TABLETS BY MOUTH THREE TIMES DAILY FOR 30 DAYS., Disp: 180 tablet, Rfl: 0    hydroCHLOROthiazide (HYDRODIURIL) 25 MG tablet, TAKE 1 TABLET BY MOUTH EVERY OTHER DAY, Disp: 45 tablet, Rfl: 3    furosemide (LASIX) 20 MG tablet, TAKE 1 TABLET BY MOUTH DAILY AS NEEDED (LEG EDEMA), Disp: 60 tablet, Rfl: 1    oxaprozin (DAYPRO) 600 MG tablet, TAKE 1 TABLET BY MOUTH TWICE A DAY, Disp: 60 tablet, Rfl: 5    ipratropium (ATROVENT) 0.06 % nasal spray, USE 1 SPRAY BY NASAL ROUTE DAILY, Disp: 2 each, Rfl: 5    gabapentin (NEURONTIN) 300 MG capsule, TAKE 1 CAPSULE BY MOUTH 2 TIMES DAILY, Disp: 60 capsule, Rfl: 5    potassium chloride (KLOR-CON M10) 10 MEQ extended release tablet, TAKE 1 TABLET BY MOUTH EVERY OTHER DAY, Disp: 90 tablet, Rfl: 1    esomeprazole (NEXIUM) 40 MG delayed release capsule, TAKE 1 CAPSULE BY MOUTH TWICE A DAY, Disp: 60 capsule, Rfl: 5    sucralfate (CARAFATE) 1 GM tablet, TAKE 1 TABLET BY MOUTH 4 TIMES A DAY, Disp: 120 tablet, Rfl: 1    zinc gluconate 50 MG tablet, Take 50 mg by mouth daily, Disp: , Rfl:     diclofenac sodium (VOLTAREN) 1 % GEL, APPLY 2 G TOPICALLY 4 TIMES DAILY, Disp: 100 g, Rfl: 5    ferrous sulfate 325 (65 Fe) MG tablet, Take 325 mg by mouth daily (with breakfast) , Disp: , Rfl:     metaxalone (SKELAXIN) 800 MG tablet, TAKE 1 TABLET BY MOUTH 3 TIMES DAILY, Disp: 90 tablet, Rfl: 3    nitroGLYCERIN (NITROLINGUAL) 0.4 MG/SPRAY spray, Place 1 spray under the tongue every 5 minutes as needed for Chest pain., Disp: 1 Bottle, Rfl: 1    Cholecalciferol (VITAMIN D3) 3000 UNITS TABS, Take 5,000 Units by mouth daily. , Disp: , Rfl:     folic acid (FOLVITE) 1 MG tablet, Take 1 mg by mouth daily. , Disp: , Rfl:     estrogens, conjugated, (PREMARIN) 0.9 MG tablet, Take 0.9 mg by mouth daily. , Disp: , Rfl:     Ascorbic Acid (VITAMIN C) 500 MG tablet, Take 1,000 mg by mouth daily.   , Disp: , Rfl:       SOCIAL HISTORY     Social History     Social History Narrative    Not on file     Social History     Tobacco Use    Smoking status: Former Smoker     Packs/day: 1.00     Years: 3.00     Pack years: 3.00     Quit date: 1975     Years since quittin.5    Smokeless tobacco: Never Used   Vaping Use    Vaping Use: Never used   Substance Use Topics    Alcohol use: No    Drug use: No         ALLERGIES     Allergies   Allergen Reactions    Aldactone [Spironolactone] Other (See Comments)     CAUSES HEADACHES    Amlodipine Besy-Benazepril Hcl Other (See Comments)     CAUSES JOINT PAIN    Amlodipine-Atorvastatin Other (See Comments)     CAUSES INCREASED CRP     Bactrim     Cardizem [Diltiazem Hcl] Other (See Comments)     CAUSES LETHARGY AND LIGHT HEADED    Feldene [Piroxicam]     Motrin [Ibuprofen Micronized]     Naproxen     Potassium-Containing Compounds Other (See Comments)     CAUSES ACID REFLUX    Statins [Statins]      muscle    Vioxx     Zetia [Ezetimibe] Nausea Only and Other (See Comments)     CAUSES REFLUX    Macrobid [Nitrofurantoin] Nausea And Vomiting    Plaquenil [Hydroxychloroquine Sulfate] Rash         FAMILY HISTORY Family History   Problem Relation Age of Onset    Heart Disease Mother     Hypertension Mother     Heart Disease Father     Hypertension Father     Stroke Father          PHYSICAL EXAM     ED Triage Vitals [05/12/22 1831]   BP Temp Temp Source Pulse Resp SpO2 Height Weight   -- 97.9 °F (36.6 °C) Oral 93 20 96 % 5' 6\" (1.676 m) 162 lb (73.5 kg)     Initial vital signs and nursing assessment reviewed and normal. Body mass index is 26.15 kg/m². Pulsoximetry is normal per my interpretation. Additional Vital Signs:  Vitals:    05/12/22 1831   Pulse: 93   Resp: 20   Temp: 97.9 °F (36.6 °C)   SpO2: 96%       Physical Exam  Constitutional:       Appearance: Normal appearance. HENT:      Head: Normocephalic and atraumatic. Cardiovascular:      Rate and Rhythm: Normal rate and regular rhythm. Pulses: Normal pulses. Heart sounds: Normal heart sounds. Pulmonary:      Effort: Pulmonary effort is normal.      Breath sounds: Normal breath sounds. Abdominal:      General: Bowel sounds are normal.      Palpations: Abdomen is soft. Musculoskeletal:      Cervical back: Normal range of motion and neck supple. Comments: 2 inch puncture wound noted to left lateral leg   Skin:     General: Skin is warm and dry. Capillary Refill: Capillary refill takes less than 2 seconds. Neurological:      General: No focal deficit present. Mental Status: She is alert and oriented to person, place, and time. Mental status is at baseline. Psychiatric:         Mood and Affect: Mood normal.         Behavior: Behavior normal.         Thought Content: Thought content normal.         Judgment: Judgment normal.             MEDICAL DECISION MAKING   Assessment: Presents with leg wound from tree falling and hitting her leg. No other acute complaints. Wound sutured with 8 simple interrupted sutures utilizing 4-0 Prolene. Bandage placed following completion. Patient given Tdap and discharged.    1. Acute leg wound.  Plan:    Sutured during examination. ED RESULTS   Laboratory results:  Labs Reviewed - No data to display    Radiologic studies results:  XR TIBIA FIBULA RIGHT (2 VIEWS)   Final Result   Impression:   1. Generalized subcutaneous edema. No radiopaque foreign body. This document has been electronically signed by: Jim Chi MD on 05/12/2022 07:03 PM          ED Medications administered this visit:   Medications   Tetanus-Diphth-Acell Pertussis (BOOSTRIX) injection 0.5 mL (0.5 mLs IntraMUSCular Given 5/12/22 1959)         ED COURSE          Strict return precautions and follow up instructions were discussed with the patient prior to discharge, with which the patient agrees. MEDICATION CHANGES     Discharge Medication List as of 5/12/2022  8:05 PM            FINAL DISPOSITION     Final diagnoses:   Wound of right lower extremity, initial encounter     Condition: condition: good  Dispo: Discharge to home      This transcription was electronically signed. Parts of this transcriptions may have been dictated by use of voice recognition software and electronically transcribed, and parts may have been transcribed with the assistance of an ED scribe. The transcription may contain errors not detected in proofreading. Please refer to my supervising physician's documentation if my documentation differs.     Electronically Signed: Armond Adams DO, 05/23/22, 1:26 PM         Florencio Olguin MD  Resident  05/12/22 John Robertson MD  Resident  05/12/22 [id number]       Armond Adams DO  05/23/22 7921

## 2022-05-12 NOTE — ED NOTES
Patient to ED for puncture wound to lower leg. Patient states she was trimming trees and a large branch fell and hit her in the leg.  Bleeding controlled, pain 3/10     Abigail Agustin RN  05/12/22 [id number]

## 2022-05-20 ENCOUNTER — OFFICE VISIT (OUTPATIENT)
Dept: FAMILY MEDICINE CLINIC | Age: 67
End: 2022-05-20

## 2022-05-20 VITALS
BODY MASS INDEX: 25.88 KG/M2 | SYSTOLIC BLOOD PRESSURE: 118 MMHG | WEIGHT: 161 LBS | HEIGHT: 66 IN | RESPIRATION RATE: 16 BRPM | DIASTOLIC BLOOD PRESSURE: 68 MMHG | HEART RATE: 84 BPM

## 2022-05-20 DIAGNOSIS — S81.811S LACERATION OF RIGHT LOWER LEG, SEQUELA: Primary | ICD-10-CM

## 2022-05-20 PROCEDURE — 1090F PRES/ABSN URINE INCON ASSESS: CPT | Performed by: FAMILY MEDICINE

## 2022-05-20 PROCEDURE — G8400 PT W/DXA NO RESULTS DOC: HCPCS | Performed by: FAMILY MEDICINE

## 2022-05-20 PROCEDURE — 1123F ACP DISCUSS/DSCN MKR DOCD: CPT | Performed by: FAMILY MEDICINE

## 2022-05-20 PROCEDURE — G8427 DOCREV CUR MEDS BY ELIG CLIN: HCPCS | Performed by: FAMILY MEDICINE

## 2022-05-20 PROCEDURE — 1036F TOBACCO NON-USER: CPT | Performed by: FAMILY MEDICINE

## 2022-05-20 PROCEDURE — 3017F COLORECTAL CA SCREEN DOC REV: CPT | Performed by: FAMILY MEDICINE

## 2022-05-20 PROCEDURE — G8417 CALC BMI ABV UP PARAM F/U: HCPCS | Performed by: FAMILY MEDICINE

## 2022-05-20 PROCEDURE — 99213 OFFICE O/P EST LOW 20 MIN: CPT | Performed by: FAMILY MEDICINE

## 2022-05-20 ASSESSMENT — ENCOUNTER SYMPTOMS
NAUSEA: 0
EYE PAIN: 0
CONSTIPATION: 0
COUGH: 0
SORE THROAT: 0
SHORTNESS OF BREATH: 0
ABDOMINAL PAIN: 0
WHEEZING: 0
CHEST TIGHTNESS: 0

## 2022-05-20 NOTE — PROGRESS NOTES
Subjective:      Patient ID: Serene Sotelo is a 73 y.o. female. Right leg  With laceration  Noted    With  4  Cm laceration  Right  Lower lateral  Leg       Right leg  Noted    laceration  With    pine  Tree  As  Puncture  laceration    Suture / Staple Removal  The sutures were placed 7 to 10 days ago. The treatment provided moderate relief. Her temperature was unmeasured prior to arrival. There has been no drainage from the wound. There is no redness present. The swelling has improved. There is no pain present. She has no difficulty moving the affected extremity or digit. Hypertension  This is a chronic problem. Episode onset:     8  days  ago  Pertinent negatives include no chest pain, headaches, neck pain, palpitations or shortness of breath. The current treatment provides moderate improvement. There are no compliance problems. Past Medical History:   Diagnosis Date    Acid reflux     Arthritis     Bleeding tendency (HCC)     Cancer ([facility] [address] 1989    cervical    Gallbladder disease 2010    History of scarlet fever     Hyperlipidemia     Hypertension     Joint pain     Medication intolerance     VERY SENSITIVE TO MEDICATIONS    Medication intolerance     VERY SENSITIVE TO MEDICATIONS     Swelling      Review of Systems   Constitutional: Negative for appetite change, fatigue and fever. HENT: Negative for congestion, ear pain, postnasal drip and sore throat. Eyes: Negative for pain and visual disturbance. Respiratory: Negative for cough, chest tightness, shortness of breath and wheezing. Cardiovascular: Negative for chest pain, palpitations and leg swelling. Gastrointestinal: Negative for abdominal pain, constipation and nausea. Genitourinary: Negative for dysuria and frequency. Musculoskeletal: Negative for arthralgias, joint swelling, neck pain and neck stiffness. Skin: Positive for wound. Negative for rash.           Right  Leg  With    laceration  3  Cm    Neurological: Negative for dizziness, weakness, numbness and headaches. Hematological: Negative for adenopathy. Does not bruise/bleed easily. Psychiatric/Behavioral: Negative for behavioral problems and sleep disturbance. The patient is not nervous/anxious. /68 (Site: Right Upper Arm, Position: Sitting, Cuff Size: Medium Adult)   Pulse 84   Resp 16   Ht 5' 6\" (1.676 m)   Wt 161 lb (73 kg)   BMI 25.99 kg/m²   Objective:   Physical Exam  Cardiovascular:      Rate and Rhythm: Normal rate and regular rhythm. Pulses: Normal pulses. Heart sounds: Normal heart sounds. No murmur heard. Pulmonary:      Effort: Pulmonary effort is normal.      Breath sounds: Normal breath sounds. No wheezing or rales. Abdominal:      General: Abdomen is flat. There is no distension. Palpations: There is no mass. Tenderness: There is no abdominal tenderness. Hernia: No hernia is present. Musculoskeletal:         General: Swelling present. Normal range of motion. Cervical back: Normal range of motion. Right lower leg: Edema present. Left lower leg: Edema present. Skin:     Comments:   Right lower leg  With   Swelling  Noted  And  3  Cm  Laceration and  Removal  Of    4  sutures and  3  Left   In place  With  Slight dehiscence   Noted  no  Redness  Of  The  skin         Assessment:       Diagnosis Orders   1. Laceration of right lower leg, sequela           Plan:      Current Outpatient Medications   Medication Sig Dispense Refill    traMADol (ULTRAM) 50 MG tablet TAKE 2 TABLETS BY MOUTH THREE TIMES DAILY FOR 30 DAYS.  180 tablet 0    hydroCHLOROthiazide (HYDRODIURIL) 25 MG tablet TAKE 1 TABLET BY MOUTH EVERY OTHER DAY 45 tablet 3    furosemide (LASIX) 20 MG tablet TAKE 1 TABLET BY MOUTH DAILY AS NEEDED (LEG EDEMA) 60 tablet 1    oxaprozin (DAYPRO) 600 MG tablet TAKE 1 TABLET BY MOUTH TWICE A DAY 60 tablet 5    ipratropium (ATROVENT) 0.06 % nasal spray USE 1 SPRAY BY NASAL ROUTE DAILY 2 each 5    gabapentin (NEURONTIN) 300 MG capsule TAKE 1 CAPSULE BY MOUTH 2 TIMES DAILY 60 capsule 5    potassium chloride (KLOR-CON M10) 10 MEQ extended release tablet TAKE 1 TABLET BY MOUTH EVERY OTHER DAY 90 tablet 1    esomeprazole (NEXIUM) 40 MG delayed release capsule TAKE 1 CAPSULE BY MOUTH TWICE A DAY 60 capsule 5    sucralfate (CARAFATE) 1 GM tablet TAKE 1 TABLET BY MOUTH 4 TIMES A  tablet 1    zinc gluconate 50 MG tablet Take 50 mg by mouth daily      diclofenac sodium (VOLTAREN) 1 % GEL APPLY 2 G TOPICALLY 4 TIMES DAILY 100 g 5    ferrous sulfate 325 (65 Fe) MG tablet Take 325 mg by mouth daily (with breakfast)       metaxalone (SKELAXIN) 800 MG tablet TAKE 1 TABLET BY MOUTH 3 TIMES DAILY 90 tablet 3    nitroGLYCERIN (NITROLINGUAL) 0.4 MG/SPRAY spray Place 1 spray under the tongue every 5 minutes as needed for Chest pain. 1 Bottle 1    Cholecalciferol (VITAMIN D3) 3000 UNITS TABS Take 5,000 Units by mouth daily.  folic acid (FOLVITE) 1 MG tablet Take 1 mg by mouth daily.  estrogens, conjugated, (PREMARIN) 0.9 MG tablet Take 0.9 mg by mouth daily.  Ascorbic Acid (VITAMIN C) 500 MG tablet Take 1,000 mg by mouth daily. No current facility-administered medications for this visit.             see in   5  Days        Ace  Wrap on the  Leg     Marsha Grigsby MD

## 2022-05-25 ENCOUNTER — OFFICE VISIT (OUTPATIENT)
Dept: FAMILY MEDICINE CLINIC | Age: 67
End: 2022-05-25

## 2022-05-25 VITALS
BODY MASS INDEX: 25.99 KG/M2 | HEART RATE: 76 BPM | DIASTOLIC BLOOD PRESSURE: 70 MMHG | HEIGHT: 66 IN | SYSTOLIC BLOOD PRESSURE: 130 MMHG | RESPIRATION RATE: 14 BRPM

## 2022-05-25 DIAGNOSIS — K21.9 GASTROESOPHAGEAL REFLUX DISEASE WITHOUT ESOPHAGITIS: ICD-10-CM

## 2022-05-25 DIAGNOSIS — L98.9 SKIN LESIONS: ICD-10-CM

## 2022-05-25 DIAGNOSIS — S81.811S LACERATION OF RIGHT LOWER EXTREMITY, SEQUELA: Primary | ICD-10-CM

## 2022-05-25 PROCEDURE — G8427 DOCREV CUR MEDS BY ELIG CLIN: HCPCS | Performed by: FAMILY MEDICINE

## 2022-05-25 PROCEDURE — 1036F TOBACCO NON-USER: CPT | Performed by: FAMILY MEDICINE

## 2022-05-25 PROCEDURE — 99213 OFFICE O/P EST LOW 20 MIN: CPT | Performed by: FAMILY MEDICINE

## 2022-05-25 PROCEDURE — 1123F ACP DISCUSS/DSCN MKR DOCD: CPT | Performed by: FAMILY MEDICINE

## 2022-05-25 PROCEDURE — 3017F COLORECTAL CA SCREEN DOC REV: CPT | Performed by: FAMILY MEDICINE

## 2022-05-25 PROCEDURE — 1090F PRES/ABSN URINE INCON ASSESS: CPT | Performed by: FAMILY MEDICINE

## 2022-05-25 PROCEDURE — G8400 PT W/DXA NO RESULTS DOC: HCPCS | Performed by: FAMILY MEDICINE

## 2022-05-25 PROCEDURE — G8417 CALC BMI ABV UP PARAM F/U: HCPCS | Performed by: FAMILY MEDICINE

## 2022-05-25 RX ORDER — ESOMEPRAZOLE MAGNESIUM 40 MG/1
CAPSULE, DELAYED RELEASE ORAL
Qty: 60 CAPSULE | Refills: 5 | Status: SHIPPED | OUTPATIENT
Start: 2022-05-25

## 2022-05-25 ASSESSMENT — ENCOUNTER SYMPTOMS
COUGH: 0
CHEST TIGHTNESS: 0
ABDOMINAL PAIN: 0
CONSTIPATION: 0
NAUSEA: 0
EYE PAIN: 0
SHORTNESS OF BREATH: 0
SORE THROAT: 0
WHEEZING: 0

## 2022-05-25 NOTE — PROGRESS NOTES
Subjective:      Patient ID: Dulce Claude is a 73 y.o. female. Wound Check  She was originally treated 10 to 14 days ago. Her temperature was unmeasured prior to arrival. There has been clear discharge from the wound. There is no redness present. There is no swelling present. There is no pain present. She has no difficulty moving the affected extremity or digit. Past Medical History:   Diagnosis Date    Acid reflux     Arthritis     Bleeding tendency (HCC)     Cancer ([facility] [address] 1989    cervical    Gallbladder disease 2010    History of scarlet fever     Hyperlipidemia     Hypertension     Joint pain     Medication intolerance     VERY SENSITIVE TO MEDICATIONS    Medication intolerance     VERY SENSITIVE TO MEDICATIONS     Swelling      Review of Systems   Constitutional: Negative for appetite change, fatigue and fever. HENT: Negative for congestion, ear pain, postnasal drip and sore throat. Eyes: Negative for pain and visual disturbance. Respiratory: Negative for cough, chest tightness, shortness of breath and wheezing. Cardiovascular: Negative for chest pain, palpitations and leg swelling. Gastrointestinal: Negative for abdominal pain, constipation and nausea. Genitourinary: Negative for dysuria and frequency. Musculoskeletal: Negative for arthralgias, joint swelling, neck pain and neck stiffness. Skin: Negative for rash. Neurological: Negative for dizziness, weakness, numbness and headaches. Hematological: Negative for adenopathy. Does not bruise/bleed easily. Psychiatric/Behavioral: Negative for behavioral problems and sleep disturbance. The patient is not nervous/anxious. /70 (Site: Right Upper Arm, Position: Sitting, Cuff Size: Medium Adult)   Pulse 76   Resp 14   Ht 5' 6\" (1.676 m)   BMI 25.99 kg/m²   Objective:   Physical Exam  Musculoskeletal:      Right lower leg: No edema. Left lower leg: No edema.    Skin:     Comments:   Right lower  Leg  Lesion Noted   Healing    Some  secondary  intension   As  Open in mid  Area   No  redness         Assessment:       Diagnosis Orders   1. Laceration of right lower extremity, sequela  mupirocin (BACTROBAN) 2 % ointment   2. Gastroesophageal reflux disease without esophagitis  esomeprazole (NEXIUM) 40 MG delayed release capsule   3. Skin lesions  External Referral To Dermatology         Plan:      Current Outpatient Medications   Medication Sig Dispense Refill    esomeprazole (NEXIUM) 40 MG delayed release capsule TAKE 1 CAPSULE BY MOUTH TWICE A DAY 60 capsule 5    mupirocin (BACTROBAN) 2 % ointment Apply topically 3 times daily. 30 g 1    traMADol (ULTRAM) 50 MG tablet TAKE 2 TABLETS BY MOUTH THREE TIMES DAILY FOR 30 DAYS. 180 tablet 0    hydroCHLOROthiazide (HYDRODIURIL) 25 MG tablet TAKE 1 TABLET BY MOUTH EVERY OTHER DAY 45 tablet 3    furosemide (LASIX) 20 MG tablet TAKE 1 TABLET BY MOUTH DAILY AS NEEDED (LEG EDEMA) 60 tablet 1    oxaprozin (DAYPRO) 600 MG tablet TAKE 1 TABLET BY MOUTH TWICE A DAY 60 tablet 5    ipratropium (ATROVENT) 0.06 % nasal spray USE 1 SPRAY BY NASAL ROUTE DAILY 2 each 5    gabapentin (NEURONTIN) 300 MG capsule TAKE 1 CAPSULE BY MOUTH 2 TIMES DAILY 60 capsule 5    potassium chloride (KLOR-CON M10) 10 MEQ extended release tablet TAKE 1 TABLET BY MOUTH EVERY OTHER DAY 90 tablet 1    sucralfate (CARAFATE) 1 GM tablet TAKE 1 TABLET BY MOUTH 4 TIMES A DAY (Patient taking differently: PRN) 120 tablet 1    zinc gluconate 50 MG tablet Take 50 mg by mouth daily      diclofenac sodium (VOLTAREN) 1 % GEL APPLY 2 G TOPICALLY 4 TIMES DAILY 100 g 5    ferrous sulfate 325 (65 Fe) MG tablet Take 325 mg by mouth daily (with breakfast)       nitroGLYCERIN (NITROLINGUAL) 0.4 MG/SPRAY spray Place 1 spray under the tongue every 5 minutes as needed for Chest pain. 1 Bottle 1    Cholecalciferol (VITAMIN D3) 3000 UNITS TABS Take 5,000 Units by mouth daily.       folic acid (FOLVITE) 1 MG tablet Take 1 mg by mouth daily.  estrogens, conjugated, (PREMARIN) 0.9 MG tablet Take 0.9 mg by mouth daily.  Ascorbic Acid (VITAMIN C) 500 MG tablet Take 1,000 mg by mouth daily. No current facility-administered medications for this visit.      bactroban  Ointment            Orders Placed This Encounter   Procedures    External Referral To Dermatology     Referral Priority:   Routine     Referral Type:   Eval and Treat     Referral Reason:   Specialty Services Required     Referred to Provider:   Ellis Plaza MD     Requested Specialty:   Dermatology     Number of Visits Requested:   1            To see  Janessa Tovar  For  Skin  lesion   Brooke Colin MD

## 2022-06-01 ENCOUNTER — TELEPHONE (OUTPATIENT)
Dept: FAMILY MEDICINE CLINIC | Age: 67
End: 2022-06-01

## 2022-06-01 DIAGNOSIS — R53.83 FATIGUE, UNSPECIFIED TYPE: ICD-10-CM

## 2022-06-01 DIAGNOSIS — I10 ESSENTIAL HYPERTENSION: Primary | ICD-10-CM

## 2022-06-01 NOTE — TELEPHONE ENCOUNTER
Patient called and requested a Lab Order: CBC. She stated she has been really tired, however she has been working really hard at home and not getting a lot of sleep. She did restart Iron every other day last Friday 5-. Please advise?

## 2022-06-03 DIAGNOSIS — M25.561 CHRONIC PAIN OF BOTH KNEES: ICD-10-CM

## 2022-06-03 DIAGNOSIS — M47.816 DEGENERATIVE JOINT DISEASE OF CERVICAL AND LUMBAR SPINE: Chronic | ICD-10-CM

## 2022-06-03 DIAGNOSIS — G89.29 CHRONIC PAIN OF BOTH KNEES: ICD-10-CM

## 2022-06-03 DIAGNOSIS — M25.562 CHRONIC PAIN OF BOTH KNEES: ICD-10-CM

## 2022-06-03 DIAGNOSIS — M47.812 DEGENERATIVE JOINT DISEASE OF CERVICAL AND LUMBAR SPINE: Chronic | ICD-10-CM

## 2022-06-03 RX ORDER — TRAMADOL HYDROCHLORIDE 50 MG/1
100 TABLET ORAL 3 TIMES DAILY
Qty: 180 TABLET | Refills: 0 | Status: SHIPPED | OUTPATIENT
Start: 2022-06-03 | End: 2022-06-30 | Stop reason: SDUPTHER

## 2022-06-03 NOTE — TELEPHONE ENCOUNTER
Date of last visit:  5/25/2022  Date of next visit:  6/13/2022    Requested Prescriptions     Pending Prescriptions Disp Refills    traMADol (ULTRAM) 50 MG tablet 180 tablet 0     Sig: Take 2 tablets by mouth three times daily for 30 days.

## 2022-06-04 ENCOUNTER — NURSE ONLY (OUTPATIENT)
Dept: LAB | Age: 67
End: 2022-06-04

## 2022-06-04 DIAGNOSIS — R53.83 FATIGUE, UNSPECIFIED TYPE: ICD-10-CM

## 2022-06-04 DIAGNOSIS — I10 ESSENTIAL HYPERTENSION: ICD-10-CM

## 2022-06-04 LAB
ANION GAP SERPL CALCULATED.3IONS-SCNC: 12 MEQ/L (ref 8–16)
BASOPHILIA: ABNORMAL
BASOPHILS # BLD: 1.2 %
BASOPHILS ABSOLUTE: 0.1 THOU/MM3 (ref 0–0.1)
BUN BLDV-MCNC: 20 MG/DL (ref 7–22)
CALCIUM SERPL-MCNC: 8.6 MG/DL (ref 8.5–10.5)
CHLORIDE BLD-SCNC: 100 MEQ/L (ref 98–111)
CO2: 28 MEQ/L (ref 23–33)
CREAT SERPL-MCNC: 0.8 MG/DL (ref 0.4–1.2)
EOSINOPHIL # BLD: 5 %
EOSINOPHILS ABSOLUTE: 0.3 THOU/MM3 (ref 0–0.4)
ERYTHROCYTE [DISTWIDTH] IN BLOOD BY AUTOMATED COUNT: 19.8 % (ref 11.5–14.5)
ERYTHROCYTE [DISTWIDTH] IN BLOOD BY AUTOMATED COUNT: 49.1 FL (ref 35–45)
GFR SERPL CREATININE-BSD FRML MDRD: 72 ML/MIN/1.73M2
GLUCOSE BLD-MCNC: 98 MG/DL (ref 70–108)
HCT VFR BLD CALC: 31.1 % (ref 37–47)
HEMOGLOBIN: 8.9 GM/DL (ref 12–16)
HYPOCHROMIA: PRESENT
IMMATURE GRANS (ABS): 0.03 THOU/MM3 (ref 0–0.07)
IMMATURE GRANULOCYTES: 0.6 %
LYMPHOCYTES # BLD: 26.5 %
LYMPHOCYTES ABSOLUTE: 1.4 THOU/MM3 (ref 1–4.8)
MCH RBC QN AUTO: 21.6 PG (ref 26–33)
MCHC RBC AUTO-ENTMCNC: 28.6 GM/DL (ref 32.2–35.5)
MCV RBC AUTO: 75.5 FL (ref 81–99)
MONOCYTES # BLD: 11.9 %
MONOCYTES ABSOLUTE: 0.6 THOU/MM3 (ref 0.4–1.3)
NUCLEATED RED BLOOD CELLS: 0 /100 WBC
PLATELET # BLD: 401 THOU/MM3 (ref 130–400)
PLATELET ESTIMATE: ADEQUATE
PMV BLD AUTO: 9.2 FL (ref 9.4–12.4)
POTASSIUM SERPL-SCNC: 3.4 MEQ/L (ref 3.5–5.2)
RBC # BLD: 4.12 MILL/MM3 (ref 4.2–5.4)
SCAN OF BLOOD SMEAR: NORMAL
SEG NEUTROPHILS: 54.8 %
SEGMENTED NEUTROPHILS ABSOLUTE COUNT: 2.8 THOU/MM3 (ref 1.8–7.7)
SODIUM BLD-SCNC: 140 MEQ/L (ref 135–145)
WBC # BLD: 5.2 THOU/MM3 (ref 4.8–10.8)

## 2022-06-06 ENCOUNTER — TELEPHONE (OUTPATIENT)
Dept: FAMILY MEDICINE CLINIC | Age: 67
End: 2022-06-06

## 2022-06-06 NOTE — TELEPHONE ENCOUNTER
----- Message from Yovani Cruz MD sent at 6/5/2022  3:45 PM EDT -----  Hgb is  back  down  to  8.9 and  and microcytic and potassium slight low     Nexium at  bid and on carafate     Probably should see dr Lovey Kocher    Please call

## 2022-06-06 NOTE — TELEPHONE ENCOUNTER
----- Message from Brian Simms MD sent at 6/5/2022  3:48 PM EDT -----  See note and call                                                                                                                                                                                                                                                                                                                                                               please  call on b

## 2022-06-13 ENCOUNTER — OFFICE VISIT (OUTPATIENT)
Dept: FAMILY MEDICINE CLINIC | Age: 67
End: 2022-06-13

## 2022-06-13 VITALS
HEIGHT: 66 IN | DIASTOLIC BLOOD PRESSURE: 70 MMHG | RESPIRATION RATE: 12 BRPM | HEART RATE: 72 BPM | WEIGHT: 159.6 LBS | SYSTOLIC BLOOD PRESSURE: 130 MMHG | BODY MASS INDEX: 25.65 KG/M2

## 2022-06-13 DIAGNOSIS — G89.29 CHRONIC PAIN OF BOTH KNEES: ICD-10-CM

## 2022-06-13 DIAGNOSIS — I10 ESSENTIAL HYPERTENSION: Primary | ICD-10-CM

## 2022-06-13 DIAGNOSIS — M25.561 CHRONIC PAIN OF BOTH KNEES: ICD-10-CM

## 2022-06-13 DIAGNOSIS — I73.00 RAYNAUD'S PHENOMENON WITHOUT GANGRENE: ICD-10-CM

## 2022-06-13 DIAGNOSIS — L97.812 VENOUS STASIS ULCER OF OTHER PART OF RIGHT LOWER LEG WITH FAT LAYER EXPOSED WITHOUT VARICOSE VEINS (HCC): ICD-10-CM

## 2022-06-13 DIAGNOSIS — I87.2 VENOUS STASIS ULCER OF OTHER PART OF RIGHT LOWER LEG WITH FAT LAYER EXPOSED WITHOUT VARICOSE VEINS (HCC): ICD-10-CM

## 2022-06-13 DIAGNOSIS — K21.9 GASTROESOPHAGEAL REFLUX DISEASE WITHOUT ESOPHAGITIS: ICD-10-CM

## 2022-06-13 DIAGNOSIS — M47.816 DEGENERATIVE JOINT DISEASE OF CERVICAL AND LUMBAR SPINE: Chronic | ICD-10-CM

## 2022-06-13 DIAGNOSIS — M25.562 CHRONIC PAIN OF BOTH KNEES: ICD-10-CM

## 2022-06-13 DIAGNOSIS — M47.812 DEGENERATIVE JOINT DISEASE OF CERVICAL AND LUMBAR SPINE: Chronic | ICD-10-CM

## 2022-06-13 PROCEDURE — 1090F PRES/ABSN URINE INCON ASSESS: CPT | Performed by: FAMILY MEDICINE

## 2022-06-13 PROCEDURE — 1123F ACP DISCUSS/DSCN MKR DOCD: CPT | Performed by: FAMILY MEDICINE

## 2022-06-13 PROCEDURE — 1036F TOBACCO NON-USER: CPT | Performed by: FAMILY MEDICINE

## 2022-06-13 PROCEDURE — G8417 CALC BMI ABV UP PARAM F/U: HCPCS | Performed by: FAMILY MEDICINE

## 2022-06-13 PROCEDURE — 99213 OFFICE O/P EST LOW 20 MIN: CPT | Performed by: FAMILY MEDICINE

## 2022-06-13 PROCEDURE — 3017F COLORECTAL CA SCREEN DOC REV: CPT | Performed by: FAMILY MEDICINE

## 2022-06-13 PROCEDURE — G8427 DOCREV CUR MEDS BY ELIG CLIN: HCPCS | Performed by: FAMILY MEDICINE

## 2022-06-13 PROCEDURE — G8400 PT W/DXA NO RESULTS DOC: HCPCS | Performed by: FAMILY MEDICINE

## 2022-06-13 RX ORDER — POTASSIUM CHLORIDE 750 MG/1
TABLET, EXTENDED RELEASE ORAL
Qty: 30 TABLET | Refills: 5 | Status: SHIPPED | OUTPATIENT
Start: 2022-06-13

## 2022-06-13 ASSESSMENT — ENCOUNTER SYMPTOMS
BACK PAIN: 1
SORE THROAT: 0
SHORTNESS OF BREATH: 0
ABDOMINAL PAIN: 0
NAUSEA: 0
WHEEZING: 0
COUGH: 0
CHEST TIGHTNESS: 0
EYE PAIN: 0
CONSTIPATION: 0

## 2022-06-13 NOTE — PROGRESS NOTES
Subjective:      Patient ID: Arabella Burgess is a 73 y.o. female. Right  Leg  chronic  Opening     Noted          Anemia    Stable         Chronic  Gastritis  Noted        Arthritis   Noted   Of  Back  With    daypro and   Tramadol    But  With anemia       Use  of  Estrogen   Noted                   Back Pain  This is a chronic problem. The current episode started more than 1 year ago. The problem occurs constantly. The pain is present in the lumbar spine. The symptoms are aggravated by standing. Pertinent negatives include no abdominal pain, chest pain, dysuria, fever, headaches, numbness or weakness. She has tried NSAIDs for the symptoms. The treatment provided mild relief. Past Medical History:   Diagnosis Date    Acid reflux     Arthritis     Bleeding tendency (HCC)     Cancer ([facility] [address] 1989    cervical    Gallbladder disease 2010    History of scarlet fever     Hyperlipidemia     Hypertension     Joint pain     Medication intolerance     VERY SENSITIVE TO MEDICATIONS    Medication intolerance     VERY SENSITIVE TO MEDICATIONS     Swelling      Review of Systems   Constitutional: Negative for appetite change, fatigue and fever. HENT: Negative for congestion, ear pain, postnasal drip and sore throat. Eyes: Negative for pain and visual disturbance. Respiratory: Negative for cough, chest tightness, shortness of breath and wheezing. Cardiovascular: Negative for chest pain, palpitations and leg swelling. Gastrointestinal: Negative for abdominal pain, constipation and nausea. Genitourinary: Negative for dysuria and frequency. Musculoskeletal: Positive for back pain. Negative for arthralgias, joint swelling, neck pain and neck stiffness. Skin: Negative for rash. Neurological: Negative for dizziness, weakness, numbness and headaches. Hematological: Negative for adenopathy. Does not bruise/bleed easily.    Psychiatric/Behavioral: Negative for behavioral problems and sleep disturbance. The patient is not nervous/anxious. /70 (Site: Right Upper Arm, Position: Sitting, Cuff Size: Medium Adult)   Pulse 72   Resp 12   Ht 5' 6\" (1.676 m)   Wt 159 lb 9.6 oz (72.4 kg)   BMI 25.76 kg/m²   Objective:   Physical Exam  Vitals and nursing note reviewed. Constitutional:       Appearance: She is well-developed. HENT:      Head: Normocephalic and atraumatic. Right Ear: External ear normal.      Left Ear: External ear normal.      Nose: Nose normal.   Eyes:      General: No scleral icterus. Conjunctiva/sclera: Conjunctivae normal.      Pupils: Pupils are equal, round, and reactive to light. Neck:      Thyroid: No thyromegaly. Vascular: No JVD. Cardiovascular:      Rate and Rhythm: Normal rate and regular rhythm. Heart sounds: Normal heart sounds. Pulmonary:      Effort: Pulmonary effort is normal.      Breath sounds: Normal breath sounds. No wheezing or rales. Abdominal:      General: Bowel sounds are normal. There is no distension. Palpations: Abdomen is soft. There is no mass. Tenderness: There is no abdominal tenderness. Musculoskeletal:         General: No tenderness. Normal range of motion. Cervical back: Normal range of motion and neck supple. Comments:   Knee  Arthritis  noted   Lymphadenopathy:      Cervical: No cervical adenopathy. Skin:     General: Skin is warm and dry. Findings: No rash. Comments:    3  By  4  Cm   Lesion  Noted     Neurological:      Mental Status: She is alert and oriented to person, place, and time. Cranial Nerves: No cranial nerve deficit. Deep Tendon Reflexes: Reflexes are normal and symmetric. Assessment:       Diagnosis Orders   1. Essential hypertension     2. Raynaud's phenomenon without gangrene     3. Gastroesophageal reflux disease without esophagitis     4. Degenerative joint disease of cervical and lumbar spine     5. Chronic pain of both knees     6.  Venous stasis ulcer of other part of right lower leg with fat layer exposed without varicose veins (HCC)           Plan:      Current Outpatient Medications   Medication Sig Dispense Refill    traMADol (ULTRAM) 50 MG tablet Take 2 tablets by mouth three times daily for 30 days. 180 tablet 0    esomeprazole (NEXIUM) 40 MG delayed release capsule TAKE 1 CAPSULE BY MOUTH TWICE A DAY 60 capsule 5    hydroCHLOROthiazide (HYDRODIURIL) 25 MG tablet TAKE 1 TABLET BY MOUTH EVERY OTHER DAY 45 tablet 3    furosemide (LASIX) 20 MG tablet TAKE 1 TABLET BY MOUTH DAILY AS NEEDED (LEG EDEMA) 60 tablet 1    oxaprozin (DAYPRO) 600 MG tablet TAKE 1 TABLET BY MOUTH TWICE A DAY 60 tablet 5    ipratropium (ATROVENT) 0.06 % nasal spray USE 1 SPRAY BY NASAL ROUTE DAILY 2 each 5    gabapentin (NEURONTIN) 300 MG capsule TAKE 1 CAPSULE BY MOUTH 2 TIMES DAILY 60 capsule 5    potassium chloride (KLOR-CON M10) 10 MEQ extended release tablet TAKE 1 TABLET BY MOUTH EVERY OTHER DAY 90 tablet 1    sucralfate (CARAFATE) 1 GM tablet TAKE 1 TABLET BY MOUTH 4 TIMES A DAY (Patient taking differently: PRN) 120 tablet 1    zinc gluconate 50 MG tablet Take 50 mg by mouth daily      diclofenac sodium (VOLTAREN) 1 % GEL APPLY 2 G TOPICALLY 4 TIMES DAILY 100 g 5    ferrous sulfate 325 (65 Fe) MG tablet Take 325 mg by mouth daily (with breakfast)       nitroGLYCERIN (NITROLINGUAL) 0.4 MG/SPRAY spray Place 1 spray under the tongue every 5 minutes as needed for Chest pain. 1 Bottle 1    Cholecalciferol (VITAMIN D3) 3000 UNITS TABS Take 5,000 Units by mouth daily.  folic acid (FOLVITE) 1 MG tablet Take 1 mg by mouth daily.  estrogens, conjugated, (PREMARIN) 0.9 MG tablet Take 0.9 mg by mouth daily.  Ascorbic Acid (VITAMIN C) 500 MG tablet Take 1,000 mg by mouth daily. No current facility-administered medications for this visit. No orders of the defined types were placed in this encounter.           Knee and  Back pain and  With The  tramadol    See in  3  mths     Kaylie Paniagua MD

## 2022-06-15 ENCOUNTER — TELEPHONE (OUTPATIENT)
Dept: FAMILY MEDICINE CLINIC | Age: 67
End: 2022-06-15

## 2022-06-15 ENCOUNTER — NURSE ONLY (OUTPATIENT)
Dept: FAMILY MEDICINE CLINIC | Age: 67
End: 2022-06-15

## 2022-06-15 ENCOUNTER — NURSE ONLY (OUTPATIENT)
Dept: LAB | Age: 67
End: 2022-06-15

## 2022-06-15 DIAGNOSIS — M25.50 ARTHRALGIA, UNSPECIFIED JOINT: ICD-10-CM

## 2022-06-15 DIAGNOSIS — M25.50 ARTHRALGIA, UNSPECIFIED JOINT: Primary | ICD-10-CM

## 2022-06-15 DIAGNOSIS — M19.90 ARTHRITIS: Primary | ICD-10-CM

## 2022-06-15 LAB
ANISOCYTOSIS: PRESENT
BASOPHILS # BLD: 0.3 %
BASOPHILS ABSOLUTE: 0 THOU/MM3 (ref 0–0.1)
C-REACTIVE PROTEIN: 4.83 MG/DL (ref 0–1)
EOSINOPHIL # BLD: 0.6 %
EOSINOPHILS ABSOLUTE: 0.1 THOU/MM3 (ref 0–0.4)
ERYTHROCYTE [DISTWIDTH] IN BLOOD BY AUTOMATED COUNT: 23.5 % (ref 11.5–14.5)
ERYTHROCYTE [DISTWIDTH] IN BLOOD BY AUTOMATED COUNT: 59.9 FL (ref 35–45)
HCT VFR BLD CALC: 33.7 % (ref 37–47)
HEMOGLOBIN: 10 GM/DL (ref 12–16)
IMMATURE GRANS (ABS): 0.06 THOU/MM3 (ref 0–0.07)
IMMATURE GRANULOCYTES: 0.5 %
LYMPHOCYTES # BLD: 11 %
LYMPHOCYTES ABSOLUTE: 1.3 THOU/MM3 (ref 1–4.8)
MCH RBC QN AUTO: 22.9 PG (ref 26–33)
MCHC RBC AUTO-ENTMCNC: 29.7 GM/DL (ref 32.2–35.5)
MCV RBC AUTO: 77.3 FL (ref 81–99)
MONOCYTES # BLD: 6.1 %
MONOCYTES ABSOLUTE: 0.7 THOU/MM3 (ref 0.4–1.3)
NUCLEATED RED BLOOD CELLS: 0 /100 WBC
PLATELET # BLD: 371 THOU/MM3 (ref 130–400)
PMV BLD AUTO: 9.3 FL (ref 9.4–12.4)
RBC # BLD: 4.36 MILL/MM3 (ref 4.2–5.4)
SEDIMENTATION RATE, ERYTHROCYTE: 16 MM/HR (ref 0–20)
SEG NEUTROPHILS: 81.5 %
SEGMENTED NEUTROPHILS ABSOLUTE COUNT: 9.7 THOU/MM3 (ref 1.8–7.7)
URIC ACID: 2.6 MG/DL (ref 2.4–5.7)
WBC # BLD: 11.9 THOU/MM3 (ref 4.8–10.8)

## 2022-06-15 PROCEDURE — 96372 THER/PROPH/DIAG INJ SC/IM: CPT | Performed by: FAMILY MEDICINE

## 2022-06-15 RX ORDER — TRIAMCINOLONE ACETONIDE 40 MG/ML
60 INJECTION, SUSPENSION INTRA-ARTICULAR; INTRAMUSCULAR ONCE
Status: COMPLETED | OUTPATIENT
Start: 2022-06-15 | End: 2022-06-15

## 2022-06-15 RX ORDER — PREDNISONE 20 MG/1
TABLET ORAL
Qty: 24 TABLET | Refills: 0 | Status: SHIPPED | OUTPATIENT
Start: 2022-06-15 | End: 2022-09-19 | Stop reason: SDUPTHER

## 2022-06-15 RX ADMIN — TRIAMCINOLONE ACETONIDE 60 MG: 40 INJECTION, SUSPENSION INTRA-ARTICULAR; INTRAMUSCULAR at 10:21

## 2022-06-15 NOTE — TELEPHONE ENCOUNTER
Joint pain and no fever  Or trauma and ?  Arthritic flair     labs for rheumatoid and steroid Im and po

## 2022-06-16 ENCOUNTER — TELEPHONE (OUTPATIENT)
Dept: FAMILY MEDICINE CLINIC | Age: 67
End: 2022-06-16

## 2022-06-16 DIAGNOSIS — M25.50 ARTHRALGIA, UNSPECIFIED JOINT: Primary | ICD-10-CM

## 2022-06-16 LAB — RHEUMATOID FACTOR: 13 IU/ML (ref 0–13)

## 2022-06-16 NOTE — TELEPHONE ENCOUNTER
Called [facility] and they were able to add on the Rheumatoid Factor.      MOM to return call to office

## 2022-06-16 NOTE — TELEPHONE ENCOUNTER
----- Message from Lore Dhillon MD sent at 6/16/2022  6:04 AM EDT -----  Call as crp up  otherwise ok and hgb back up to 10 ,  no rheumatoid factor and thought ordered and see if can get of lab other wise need done as my error    Order on printer

## 2022-06-17 LAB — ANA SCREEN: NORMAL

## 2022-06-23 ENCOUNTER — TELEPHONE (OUTPATIENT)
Dept: FAMILY MEDICINE CLINIC | Age: 67
End: 2022-06-23

## 2022-06-23 NOTE — TELEPHONE ENCOUNTER
----- Message from Nadia Mays MD sent at 6/22/2022  6:44 PM EDT -----  Let her know the RADHA was negative

## 2022-06-29 DIAGNOSIS — M47.812 DEGENERATIVE JOINT DISEASE OF CERVICAL AND LUMBAR SPINE: Chronic | ICD-10-CM

## 2022-06-29 DIAGNOSIS — M25.562 CHRONIC PAIN OF BOTH KNEES: ICD-10-CM

## 2022-06-29 DIAGNOSIS — G89.29 CHRONIC PAIN OF BOTH KNEES: ICD-10-CM

## 2022-06-29 DIAGNOSIS — M25.561 CHRONIC PAIN OF BOTH KNEES: ICD-10-CM

## 2022-06-29 DIAGNOSIS — M47.816 DEGENERATIVE JOINT DISEASE OF CERVICAL AND LUMBAR SPINE: Chronic | ICD-10-CM

## 2022-06-29 NOTE — TELEPHONE ENCOUNTER
Date of last visit:  6/13/2022  Date of next visit:  9/19/2022    Requested Prescriptions     Pending Prescriptions Disp Refills    traMADol (ULTRAM) 50 MG tablet 180 tablet 0     Sig: Take 2 tablets by mouth three times daily for 30 days.

## 2022-06-30 RX ORDER — TRAMADOL HYDROCHLORIDE 50 MG/1
100 TABLET ORAL 3 TIMES DAILY
Qty: 180 TABLET | Refills: 0 | Status: SHIPPED | OUTPATIENT
Start: 2022-06-30 | End: 2022-08-02

## 2022-07-14 ENCOUNTER — NURSE ONLY (OUTPATIENT)
Dept: LAB | Age: 67
End: 2022-07-14

## 2022-07-14 LAB
FERRITIN: 57 NG/ML (ref 10–291)
IRON: 32 UG/DL (ref 50–170)
TOTAL IRON BINDING CAPACITY: 453 UG/DL (ref 171–450)

## 2022-07-16 LAB — SOLUBLE TRANSFERRIN RECEPT: 7.8 MG/L (ref 1.9–4.4)

## 2022-07-22 ENCOUNTER — TELEPHONE (OUTPATIENT)
Dept: FAMILY MEDICINE CLINIC | Age: 67
End: 2022-07-22

## 2022-08-01 DIAGNOSIS — M25.562 CHRONIC PAIN OF BOTH KNEES: ICD-10-CM

## 2022-08-01 DIAGNOSIS — M47.816 DEGENERATIVE JOINT DISEASE OF CERVICAL AND LUMBAR SPINE: Chronic | ICD-10-CM

## 2022-08-01 DIAGNOSIS — G89.29 CHRONIC PAIN OF BOTH KNEES: ICD-10-CM

## 2022-08-01 DIAGNOSIS — M25.561 CHRONIC PAIN OF BOTH KNEES: ICD-10-CM

## 2022-08-01 DIAGNOSIS — M47.812 DEGENERATIVE JOINT DISEASE OF CERVICAL AND LUMBAR SPINE: Chronic | ICD-10-CM

## 2022-08-02 RX ORDER — TRAMADOL HYDROCHLORIDE 50 MG/1
TABLET ORAL
Qty: 180 TABLET | Refills: 0 | Status: SHIPPED | OUTPATIENT
Start: 2022-08-02 | End: 2022-09-01 | Stop reason: SDUPTHER

## 2022-09-01 DIAGNOSIS — M47.816 DEGENERATIVE JOINT DISEASE OF CERVICAL AND LUMBAR SPINE: Chronic | ICD-10-CM

## 2022-09-01 DIAGNOSIS — G89.29 CHRONIC PAIN OF BOTH KNEES: ICD-10-CM

## 2022-09-01 DIAGNOSIS — M47.812 DEGENERATIVE JOINT DISEASE OF CERVICAL AND LUMBAR SPINE: Chronic | ICD-10-CM

## 2022-09-01 DIAGNOSIS — M25.562 CHRONIC PAIN OF BOTH KNEES: ICD-10-CM

## 2022-09-01 DIAGNOSIS — M25.561 CHRONIC PAIN OF BOTH KNEES: ICD-10-CM

## 2022-09-01 RX ORDER — TRAMADOL HYDROCHLORIDE 50 MG/1
100 TABLET ORAL EVERY 8 HOURS PRN
Qty: 180 TABLET | Refills: 0 | Status: SHIPPED | OUTPATIENT
Start: 2022-09-01 | End: 2022-10-04 | Stop reason: SDUPTHER

## 2022-09-01 NOTE — TELEPHONE ENCOUNTER
Date of last visit:  6/13/2022  Date of next visit:  9/19/2022    Requested Prescriptions     Pending Prescriptions Disp Refills    traMADol (ULTRAM) 50 MG tablet 180 tablet 0

## 2022-09-19 ENCOUNTER — OFFICE VISIT (OUTPATIENT)
Dept: FAMILY MEDICINE CLINIC | Age: 67
End: 2022-09-19

## 2022-09-19 VITALS
WEIGHT: 155.5 LBS | BODY MASS INDEX: 24.99 KG/M2 | SYSTOLIC BLOOD PRESSURE: 126 MMHG | RESPIRATION RATE: 16 BRPM | DIASTOLIC BLOOD PRESSURE: 84 MMHG | HEIGHT: 66 IN | HEART RATE: 80 BPM

## 2022-09-19 DIAGNOSIS — G89.29 CHRONIC PAIN OF BOTH KNEES: ICD-10-CM

## 2022-09-19 DIAGNOSIS — M25.562 CHRONIC PAIN OF BOTH KNEES: ICD-10-CM

## 2022-09-19 DIAGNOSIS — M47.816 DEGENERATIVE JOINT DISEASE OF CERVICAL AND LUMBAR SPINE: Chronic | ICD-10-CM

## 2022-09-19 DIAGNOSIS — I73.00 RAYNAUD'S PHENOMENON WITHOUT GANGRENE: ICD-10-CM

## 2022-09-19 DIAGNOSIS — M25.561 CHRONIC PAIN OF BOTH KNEES: ICD-10-CM

## 2022-09-19 DIAGNOSIS — I10 ESSENTIAL HYPERTENSION: Primary | ICD-10-CM

## 2022-09-19 DIAGNOSIS — M19.90 ARTHRITIS: ICD-10-CM

## 2022-09-19 DIAGNOSIS — M47.812 DEGENERATIVE JOINT DISEASE OF CERVICAL AND LUMBAR SPINE: Chronic | ICD-10-CM

## 2022-09-19 DIAGNOSIS — K21.9 GASTROESOPHAGEAL REFLUX DISEASE WITHOUT ESOPHAGITIS: ICD-10-CM

## 2022-09-19 PROCEDURE — G8400 PT W/DXA NO RESULTS DOC: HCPCS | Performed by: FAMILY MEDICINE

## 2022-09-19 PROCEDURE — 1036F TOBACCO NON-USER: CPT | Performed by: FAMILY MEDICINE

## 2022-09-19 PROCEDURE — 1123F ACP DISCUSS/DSCN MKR DOCD: CPT | Performed by: FAMILY MEDICINE

## 2022-09-19 PROCEDURE — 99213 OFFICE O/P EST LOW 20 MIN: CPT | Performed by: FAMILY MEDICINE

## 2022-09-19 PROCEDURE — G8427 DOCREV CUR MEDS BY ELIG CLIN: HCPCS | Performed by: FAMILY MEDICINE

## 2022-09-19 PROCEDURE — G8417 CALC BMI ABV UP PARAM F/U: HCPCS | Performed by: FAMILY MEDICINE

## 2022-09-19 PROCEDURE — 3017F COLORECTAL CA SCREEN DOC REV: CPT | Performed by: FAMILY MEDICINE

## 2022-09-19 PROCEDURE — 1090F PRES/ABSN URINE INCON ASSESS: CPT | Performed by: FAMILY MEDICINE

## 2022-09-19 RX ORDER — PREDNISONE 20 MG/1
TABLET ORAL
Qty: 24 TABLET | Refills: 0 | Status: SHIPPED | OUTPATIENT
Start: 2022-09-19

## 2022-09-19 SDOH — ECONOMIC STABILITY: FOOD INSECURITY: WITHIN THE PAST 12 MONTHS, YOU WORRIED THAT YOUR FOOD WOULD RUN OUT BEFORE YOU GOT MONEY TO BUY MORE.: NEVER TRUE

## 2022-09-19 SDOH — ECONOMIC STABILITY: FOOD INSECURITY: WITHIN THE PAST 12 MONTHS, THE FOOD YOU BOUGHT JUST DIDN'T LAST AND YOU DIDN'T HAVE MONEY TO GET MORE.: NEVER TRUE

## 2022-09-19 ASSESSMENT — SOCIAL DETERMINANTS OF HEALTH (SDOH): HOW HARD IS IT FOR YOU TO PAY FOR THE VERY BASICS LIKE FOOD, HOUSING, MEDICAL CARE, AND HEATING?: NOT HARD AT ALL

## 2022-09-19 ASSESSMENT — ENCOUNTER SYMPTOMS
CONSTIPATION: 0
WHEEZING: 0
NAUSEA: 0
CHEST TIGHTNESS: 0
ABDOMINAL PAIN: 0
EYE PAIN: 0
COUGH: 0
SORE THROAT: 0
SHORTNESS OF BREATH: 0

## 2022-09-19 NOTE — PROGRESS NOTES
Subjective:      Patient ID: Radha King is a 73 y.o. female. Diffuse  arthritis  noted     tramadol    6  a day      Gerd  stable      Leg  edema   stable     Hypertension  This is a chronic problem. The current episode started more than 1 year ago. The problem has been resolved since onset. The problem is controlled. Pertinent negatives include no chest pain, headaches, neck pain, palpitations, peripheral edema or shortness of breath. The current treatment provides significant improvement. There are no compliance problems. Arthritis  Presents for follow-up visit. She complains of pain. She reports no joint swelling. The symptoms have been stable. Affected location: multiple   joint  pain. Her pain is at a severity of 4/10. Pertinent negatives include no dysuria, fatigue, fever or rash. Compliance with total regimen is %. Compliance with medications is %. Past Medical History:   Diagnosis Date    Acid reflux     Arthritis     Bleeding tendency (HCC)     Cancer (HCC) 1989    cervical    Gallbladder disease 2010    History of scarlet fever     Hyperlipidemia     Hypertension     Joint pain     Medication intolerance     VERY SENSITIVE TO MEDICATIONS    Medication intolerance     VERY SENSITIVE TO MEDICATIONS     Swelling       Review of Systems   Constitutional:  Negative for appetite change, fatigue and fever. HENT:  Negative for congestion, ear pain, postnasal drip and sore throat. Eyes:  Negative for pain and visual disturbance. Respiratory:  Negative for cough, chest tightness, shortness of breath and wheezing. Cardiovascular:  Negative for chest pain, palpitations and leg swelling. Gastrointestinal:  Negative for abdominal pain, constipation and nausea. Genitourinary:  Negative for dysuria and frequency. Musculoskeletal:  Positive for arthritis. Negative for arthralgias, joint swelling, neck pain and neck stiffness. Skin:  Negative for rash.    Neurological:  Negative gangrene  I73.00       4. Chronic pain of both knees  M25.561     M25.562     G89.29       5. Gastroesophageal reflux disease without esophagitis  K21.9       6. Arthritis  M19.90              Plan:     Current Outpatient Medications   Medication Sig Dispense Refill    oxaprozin (DAYPRO) 600 MG tablet TAKE 1 TABLET BY MOUTH TWICE A DAY 60 tablet 5    predniSONE (DELTASONE) 20 MG tablet Take 1 tablet, PO TID x 3 days then BID x 5 days then daily x 5 days 24 tablet 0    traMADol (ULTRAM) 50 MG tablet Take 2 tablets by mouth every 8 hours as needed for Pain for up to 30 days. 180 tablet 0    potassium chloride (KLOR-CON M10) 10 MEQ extended release tablet TAKE 1 TABLET BY MOUTH DAILY 30 tablet 5    esomeprazole (NEXIUM) 40 MG delayed release capsule TAKE 1 CAPSULE BY MOUTH TWICE A DAY 60 capsule 5    hydroCHLOROthiazide (HYDRODIURIL) 25 MG tablet TAKE 1 TABLET BY MOUTH EVERY OTHER DAY 45 tablet 3    furosemide (LASIX) 20 MG tablet TAKE 1 TABLET BY MOUTH DAILY AS NEEDED (LEG EDEMA) 60 tablet 1    ipratropium (ATROVENT) 0.06 % nasal spray USE 1 SPRAY BY NASAL ROUTE DAILY 2 each 5    gabapentin (NEURONTIN) 300 MG capsule TAKE 1 CAPSULE BY MOUTH 2 TIMES DAILY 60 capsule 5    sucralfate (CARAFATE) 1 GM tablet TAKE 1 TABLET BY MOUTH 4 TIMES A DAY (Patient taking differently: PRN) 120 tablet 1    zinc gluconate 50 MG tablet Take 50 mg by mouth daily      diclofenac sodium (VOLTAREN) 1 % GEL APPLY 2 G TOPICALLY 4 TIMES DAILY 100 g 5    ferrous sulfate 325 (65 Fe) MG tablet Take 325 mg by mouth daily (with breakfast)       nitroGLYCERIN (NITROLINGUAL) 0.4 MG/SPRAY spray Place 1 spray under the tongue every 5 minutes as needed for Chest pain. 1 Bottle 1    Cholecalciferol (VITAMIN D3) 3000 UNITS TABS Take 5,000 Units by mouth daily. folic acid (FOLVITE) 1 MG tablet Take 1 mg by mouth daily. estrogens, conjugated, (PREMARIN) 0.9 MG tablet Take 0.9 mg by mouth daily.         Ascorbic Acid (VITAMIN C) 500 MG tablet Take 1,000 mg by mouth daily. No current facility-administered medications for this visit. No orders of the defined types were placed in this encounter.          See in 3mths       Chronic  edema   of  legs      Legs  with  arthritis  of  neck and   back and  kneed and  shoulders   and  pdn  taper       Soluble  transferrin  noted    Marleny Leonard MD Purse String (Simple) Text: Given the location of the defect and the characteristics of the surrounding skin a purse string simple closure was deemed most appropriate.  Undermining was performed circumferentially around the surgical defect.  A purse string suture was then placed and tightened.

## 2022-10-03 DIAGNOSIS — M25.562 CHRONIC PAIN OF BOTH KNEES: ICD-10-CM

## 2022-10-03 DIAGNOSIS — M25.561 CHRONIC PAIN OF BOTH KNEES: ICD-10-CM

## 2022-10-03 DIAGNOSIS — G89.29 CHRONIC PAIN OF BOTH KNEES: ICD-10-CM

## 2022-10-03 DIAGNOSIS — M47.812 DEGENERATIVE JOINT DISEASE OF CERVICAL AND LUMBAR SPINE: Chronic | ICD-10-CM

## 2022-10-03 DIAGNOSIS — M47.816 DEGENERATIVE JOINT DISEASE OF CERVICAL AND LUMBAR SPINE: Chronic | ICD-10-CM

## 2022-10-03 NOTE — TELEPHONE ENCOUNTER
Date of last visit:  9/19/2022  Date of next visit:  1/9/2023    Requested Prescriptions     Pending Prescriptions Disp Refills    traMADol (ULTRAM) 50 MG tablet 180 tablet 0     Sig: Take 2 tablets by mouth every 8 hours as needed for Pain for up to 30 days.

## 2022-10-04 RX ORDER — TRAMADOL HYDROCHLORIDE 50 MG/1
100 TABLET ORAL EVERY 8 HOURS PRN
Qty: 180 TABLET | Refills: 0 | Status: SHIPPED | OUTPATIENT
Start: 2022-10-04 | End: 2022-11-04 | Stop reason: SDUPTHER

## 2022-10-24 ENCOUNTER — NURSE ONLY (OUTPATIENT)
Dept: FAMILY MEDICINE CLINIC | Age: 67
End: 2022-10-24

## 2022-10-24 DIAGNOSIS — Z23 NEED FOR INFLUENZA VACCINATION: Primary | ICD-10-CM

## 2022-10-24 PROCEDURE — G0008 ADMIN INFLUENZA VIRUS VAC: HCPCS | Performed by: FAMILY MEDICINE

## 2022-10-24 PROCEDURE — 90688 IIV4 VACCINE SPLT 0.5 ML IM: CPT | Performed by: FAMILY MEDICINE

## 2022-10-24 NOTE — PROGRESS NOTES
Immunizations Administered       Name Date Dose Route    Influenza, AFLURIA (age 1 yrs+), FLUZONE, (age 10 mo+), MDV, 0.5mL 10/24/2022 0.5 mL Intramuscular    Site: Deltoid- Left    Lot: [id number]    NDC: 43313-834-30

## 2022-10-24 NOTE — PROGRESS NOTES
Subjective:      Patient ID: Castillo Iverson is a 60 y.o. female. Gi   Saw and   With  Anemia  Noted    Iron  meds       labs  In    6  mths         hgb  Now  Better       Knee   Daypro        9-14  For   Foot surgery  Left    Foot             Hypertension  This is a chronic problem. The current episode started more than 1 year ago. The problem has been resolved since onset. Pertinent negatives include no chest pain, headaches, neck pain, palpitations or shortness of breath. Gastroesophageal Reflux  She reports no abdominal pain, no chest pain, no coughing, no nausea, no sore throat or no wheezing. Pertinent negatives include no fatigue.      Past Medical History:   Diagnosis Date    Acid reflux     Arthritis     Bleeding tendency (HCC)     Cancer ([facility] [address] 1989    cervical    Gallbladder disease 2010    History of scarlet fever     Hyperlipidemia     Hypertension     Joint pain     Medication intolerance     VERY SENSITIVE TO MEDICATIONS    Medication intolerance     VERY SENSITIVE TO MEDICATIONS     Swelling      Past Surgical History:   Procedure Laterality Date    BACK SURGERY      BACK SURGERY  01/11/2019    Dr Kenya Ching @ [facility]       L1  to  L5  with    rods     BLADDER REPAIR  2006    CARDIAC CATHETERIZATION  2009     wnl  and  dr. Gold Genera TEST  07/30/2009    EF 55% C NO OBVIOUS ISCHEMIA    CARDIOVASCULAR STRESS TEST  09/02/2005    EF 66% C MILD APICAL STRESS INDUCED ISCHEMIA    CERVICAL FUSION  11-22-13    Dr. Kenya Ching @ [facility], 4-5, 5-6, 6-7     COLONOSCOPY  2018      normal  and good   10 year s   [address] Espino[address]  tahbso    JOINT REPLACEMENT Right 06/13/2017    2nd stage revision     KNEE ARTHROPLASTY  2010    LT    KNEE ARTHROSCOPY  [id number]    KNEE ARTHROSCOPY Right 8-1-14    Meniscus Tear By Dr Marky Menjivar @ [facility]     KNEE ARTHROSCOPY Right 9-16-16 and 10-25-16    Dr Marky Menjivar @ [address] Right 05/02/2017    Antibiotic Spacer Placement - Dr. Avalos Georgia  1-10-15    Dr Maria Guadalupe Michel @ [address] Left 2010    analisa    TRANSTHORACIC ECHOCARDIOGRAM  2009    EF 50% MILD LT VENTRICULAR HYPERTROPHY,TRIVIAL MR AND TR, TRIVIAL AORTIC INSUFFICIENCY,DIASTOLIC DYSFUNCTION OF THE LT VENTRICLE    TRANSTHORACIC ECHOCARDIOGRAM  2005    EF 60% C TRACE MR AND MILD TR    UPPER GASTROINTESTINAL ENDOSCOPY      [facility]     Social History     Socioeconomic History    Marital status:      Spouse name: Not on file    Number of children: Not on file    Years of education: Not on file    Highest education level: Not on file   Occupational History    Not on file   Tobacco Use    Smoking status: Former Smoker     Packs/day: 1.00     Years: 3.00     Pack years: 3.00     Quit date: 1975     Years since quittin.6    Smokeless tobacco: Never Used   Vaping Use    Vaping Use: Never used   Substance and Sexual Activity    Alcohol use: No    Drug use: No    Sexual activity: Yes     Partners: Male   Other Topics Concern    Not on file   Social History Narrative    Not on file     Social Determinants of Health     Financial Resource Strain: Low Risk     Difficulty of Paying Living Expenses: Not hard at all   Food Insecurity: No Food Insecurity    Worried About [facility] in the Last Year: Never true    [facility] in the Last Year: Never true   Transportation Needs:     Lack of Transportation (Medical):      Lack of Transportation (Non-Medical):    Physical Activity:     Days of Exercise per Week:     Minutes of Exercise per Session:    Stress:     Feeling of Stress :    Social Connections:     Frequency of Communication with Friends and Family:     Frequency of Social Gatherings with Friends and Family:     Attends [employer] Services:     Active Member of Clubs or Organizations:     Attends Club or Organization Meetings:     Marital Status:    Intimate tablet Take 50 mg by mouth daily      hydroCHLOROthiazide (HYDRODIURIL) 25 MG tablet TAKE 1 TABLET BY MOUTH EVERY OTHER DAY 45 tablet 3    furosemide (LASIX) 20 MG tablet TAKE 1 TABLET BY MOUTH DAILY AS NEEDED (LEG EDEMA) (Patient taking differently: Take 20 mg by mouth every other day ) 60 tablet 1    diclofenac sodium (VOLTAREN) 1 % GEL APPLY 2 G TOPICALLY 4 TIMES DAILY 100 g 5    oxaprozin (DAYPRO) 600 MG tablet TAKE 1 TABLET BY MOUTH TWICE A DAY 60 tablet 5    esomeprazole (NEXIUM) 40 MG delayed release capsule TAKE 1 CAPSULE BY MOUTH 2 TIMES DAILY. 60 capsule 5    gabapentin (NEURONTIN) 300 MG capsule TAKE 1 CAPSULE BY MOUTH 2 TIMES DAILY 60 capsule 5    ferrous sulfate 325 (65 Fe) MG tablet Take 325 mg by mouth daily (with breakfast)       metaxalone (SKELAXIN) 800 MG tablet TAKE 1 TABLET BY MOUTH 3 TIMES DAILY (Patient taking differently: 3 times daily as needed TAKE 1 TABLET BY MOUTH 3 TIMES DAILY) 90 tablet 3    nitroGLYCERIN (NITROLINGUAL) 0.4 MG/SPRAY spray Place 1 spray under the tongue every 5 minutes as needed for Chest pain. 1 Bottle 1    Cholecalciferol (VITAMIN D3) 3000 UNITS TABS Take 5,000 Units by mouth daily.  folic acid (FOLVITE) 1 MG tablet Take 1 mg by mouth daily.  estrogens, conjugated, (PREMARIN) 0.9 MG tablet Take 0.9 mg by mouth daily.  Ascorbic Acid (VITAMIN C) 500 MG tablet Take 1,000 mg by mouth daily. No current facility-administered medications for this visit. No orders of the defined types were placed in this encounter.       See in  3 mths       Tramadol    For pain    Char Velasco MD Mucosal Advancement Flap Text: Given the location of the defect, shape of the defect and the proximity to free margins a mucosal advancement flap was deemed most appropriate. Incisions were made with a 15 blade scalpel in the appropriate fashion along the cutaneous vermillion border and the mucosal lip. The remaining actinically damaged mucosal tissue was excised.  The mucosal advancement flap was then elevated to the gingival sulcus with care taken to preserve the neurovascular structures and advanced into the primary defect. Care was taken to ensure that precise realignment of the vermilion border was achieved.

## 2022-10-25 ENCOUNTER — TELEPHONE (OUTPATIENT)
Dept: FAMILY MEDICINE CLINIC | Age: 67
End: 2022-10-25

## 2022-10-25 DIAGNOSIS — I10 ESSENTIAL HYPERTENSION: Primary | ICD-10-CM

## 2022-10-25 NOTE — TELEPHONE ENCOUNTER
Patient called and requested her Annual Lab Orders, as well as a Folic Acid Level. Please call patient once ordered and she will  the Orders.

## 2022-11-03 DIAGNOSIS — M25.561 CHRONIC PAIN OF BOTH KNEES: ICD-10-CM

## 2022-11-03 DIAGNOSIS — G89.29 CHRONIC PAIN OF BOTH KNEES: ICD-10-CM

## 2022-11-03 DIAGNOSIS — M25.562 CHRONIC PAIN OF BOTH KNEES: ICD-10-CM

## 2022-11-03 DIAGNOSIS — M47.812 DEGENERATIVE JOINT DISEASE OF CERVICAL AND LUMBAR SPINE: Chronic | ICD-10-CM

## 2022-11-03 DIAGNOSIS — M47.816 DEGENERATIVE JOINT DISEASE OF CERVICAL AND LUMBAR SPINE: Chronic | ICD-10-CM

## 2022-11-04 RX ORDER — TRAMADOL HYDROCHLORIDE 50 MG/1
100 TABLET ORAL EVERY 8 HOURS PRN
Qty: 180 TABLET | Refills: 0 | Status: SHIPPED | OUTPATIENT
Start: 2022-11-04 | End: 2022-12-01 | Stop reason: SDUPTHER

## 2022-11-14 ENCOUNTER — OFFICE VISIT (OUTPATIENT)
Dept: FAMILY MEDICINE CLINIC | Age: 67
End: 2022-11-14

## 2022-11-14 VITALS
HEART RATE: 78 BPM | DIASTOLIC BLOOD PRESSURE: 82 MMHG | SYSTOLIC BLOOD PRESSURE: 134 MMHG | BODY MASS INDEX: 24.88 KG/M2 | WEIGHT: 154.8 LBS | HEIGHT: 66 IN | RESPIRATION RATE: 16 BRPM

## 2022-11-14 DIAGNOSIS — M47.816 DEGENERATIVE JOINT DISEASE OF CERVICAL AND LUMBAR SPINE: Chronic | ICD-10-CM

## 2022-11-14 DIAGNOSIS — M25.562 CHRONIC PAIN OF BOTH KNEES: ICD-10-CM

## 2022-11-14 DIAGNOSIS — K21.9 GASTROESOPHAGEAL REFLUX DISEASE WITHOUT ESOPHAGITIS: ICD-10-CM

## 2022-11-14 DIAGNOSIS — M25.561 CHRONIC PAIN OF BOTH KNEES: ICD-10-CM

## 2022-11-14 DIAGNOSIS — G89.29 CHRONIC PAIN OF BOTH KNEES: ICD-10-CM

## 2022-11-14 DIAGNOSIS — I10 ESSENTIAL HYPERTENSION: ICD-10-CM

## 2022-11-14 DIAGNOSIS — M47.812 DEGENERATIVE JOINT DISEASE OF CERVICAL AND LUMBAR SPINE: Chronic | ICD-10-CM

## 2022-11-14 DIAGNOSIS — B35.1 TINEA UNGUIUM: Primary | ICD-10-CM

## 2022-11-14 PROCEDURE — 1036F TOBACCO NON-USER: CPT | Performed by: FAMILY MEDICINE

## 2022-11-14 PROCEDURE — G8420 CALC BMI NORM PARAMETERS: HCPCS | Performed by: FAMILY MEDICINE

## 2022-11-14 PROCEDURE — 1123F ACP DISCUSS/DSCN MKR DOCD: CPT | Performed by: FAMILY MEDICINE

## 2022-11-14 PROCEDURE — G8400 PT W/DXA NO RESULTS DOC: HCPCS | Performed by: FAMILY MEDICINE

## 2022-11-14 PROCEDURE — 99213 OFFICE O/P EST LOW 20 MIN: CPT | Performed by: FAMILY MEDICINE

## 2022-11-14 PROCEDURE — 3017F COLORECTAL CA SCREEN DOC REV: CPT | Performed by: FAMILY MEDICINE

## 2022-11-14 PROCEDURE — 1090F PRES/ABSN URINE INCON ASSESS: CPT | Performed by: FAMILY MEDICINE

## 2022-11-14 RX ORDER — TRIAMCINOLONE ACETONIDE 1 MG/ML
LOTION TOPICAL
Qty: 60 ML | Refills: 1 | Status: SHIPPED | OUTPATIENT
Start: 2022-11-14 | End: 2022-11-21

## 2022-11-14 RX ORDER — TERBINAFINE HYDROCHLORIDE 250 MG/1
250 TABLET ORAL DAILY
Qty: 30 TABLET | Refills: 1 | Status: SHIPPED | OUTPATIENT
Start: 2022-11-14

## 2022-11-14 RX ORDER — CHOLECALCIFEROL (VITAMIN D3) 125 MCG
500 CAPSULE ORAL DAILY
COMMUNITY

## 2022-11-14 ASSESSMENT — ENCOUNTER SYMPTOMS
ABDOMINAL PAIN: 0
COUGH: 0
SHORTNESS OF BREATH: 0
EYE PAIN: 0
CHEST TIGHTNESS: 0
NAUSEA: 0
CONSTIPATION: 0
SORE THROAT: 0
WHEEZING: 0

## 2022-11-14 NOTE — PROGRESS NOTES
Subjective:      Patient ID: Mary Adamson is a 78 y.o. female. Leg  edema  stable       Knee    and back   stable   stable   chronic  tramadol          Right  shoulder  pain    Nails  with  changes           Other  Pertinent negatives include no abdominal pain, arthralgias, chest pain, congestion, coughing, fatigue, fever, headaches, joint swelling, nausea, neck pain, numbness, rash, sore throat or weakness. Hypertension  This is a chronic problem. The current episode started more than 1 year ago. The problem has been resolved since onset. The problem is controlled. Pertinent negatives include no chest pain, headaches, neck pain, palpitations, peripheral edema or shortness of breath. The current treatment provides significant improvement. There are no compliance problems. Gastroesophageal Reflux  She reports no abdominal pain, no chest pain, no coughing, no nausea, no sore throat or no wheezing. This is a chronic problem. The problem occurs rarely. The problem has been resolved. The symptoms are aggravated by certain foods. Pertinent negatives include no fatigue. She has tried a PPI for the symptoms. The treatment provided significant relief. Past Medical History:   Diagnosis Date    Acid reflux     Arthritis     Bleeding tendency (HCC)     Cancer (HCC) 1989    cervical    Gallbladder disease 2010    History of scarlet fever     Hyperlipidemia     Hypertension     Joint pain     Medication intolerance     VERY SENSITIVE TO MEDICATIONS    Medication intolerance     VERY SENSITIVE TO MEDICATIONS     Swelling       Review of Systems   Constitutional:  Negative for appetite change, fatigue and fever. HENT:  Negative for congestion, ear pain, postnasal drip and sore throat. Eyes:  Negative for pain and visual disturbance. Respiratory:  Negative for cough, chest tightness, shortness of breath and wheezing. Cardiovascular:  Negative for chest pain, palpitations and leg swelling.    Gastrointestinal: Negative for abdominal pain, constipation and nausea. Genitourinary:  Negative for dysuria and frequency. Musculoskeletal:  Negative for arthralgias, joint swelling, neck pain and neck stiffness. Skin:  Negative for rash. Neurological:  Negative for dizziness, weakness, numbness and headaches. Hematological:  Negative for adenopathy. Does not bruise/bleed easily. Psychiatric/Behavioral:  Negative for behavioral problems and sleep disturbance. The patient is not nervous/anxious. /82 (Site: Right Upper Arm, Position: Sitting, Cuff Size: Medium Adult)   Pulse 78   Resp 16   Ht 5' 6\" (1.676 m)   Wt 154 lb 12.8 oz (70.2 kg)   BMI 24.99 kg/m²    Objective:   Physical Exam  Vitals and nursing note reviewed. Constitutional:       Appearance: She is well-developed. HENT:      Head: Normocephalic and atraumatic. Right Ear: External ear normal.      Left Ear: External ear normal.      Nose: Nose normal.   Eyes:      General: No scleral icterus. Conjunctiva/sclera: Conjunctivae normal.      Pupils: Pupils are equal, round, and reactive to light. Neck:      Thyroid: No thyromegaly. Vascular: No JVD. Cardiovascular:      Rate and Rhythm: Normal rate and regular rhythm. Heart sounds: Normal heart sounds. Pulmonary:      Effort: Pulmonary effort is normal.      Breath sounds: Normal breath sounds. No wheezing or rales. Abdominal:      General: Bowel sounds are normal. There is no distension. Palpations: Abdomen is soft. There is no mass. Tenderness: There is no abdominal tenderness. Musculoskeletal:         General: No tenderness. Normal range of motion. Cervical back: Normal range of motion and neck supple. Lymphadenopathy:      Cervical: No cervical adenopathy. Skin:     General: Skin is warm and dry. Findings: No rash. Comments:   Nail   bed  area  with  split and  ?   Fungal    Neurological:      Mental Status: She is alert and oriented to

## 2022-12-01 ENCOUNTER — OFFICE VISIT (OUTPATIENT)
Dept: FAMILY MEDICINE CLINIC | Age: 67
End: 2022-12-01

## 2022-12-01 VITALS
WEIGHT: 152.38 LBS | HEIGHT: 66 IN | SYSTOLIC BLOOD PRESSURE: 146 MMHG | RESPIRATION RATE: 16 BRPM | BODY MASS INDEX: 24.49 KG/M2 | HEART RATE: 80 BPM | DIASTOLIC BLOOD PRESSURE: 88 MMHG

## 2022-12-01 DIAGNOSIS — I10 ESSENTIAL HYPERTENSION: ICD-10-CM

## 2022-12-01 DIAGNOSIS — K21.9 GASTROESOPHAGEAL REFLUX DISEASE WITHOUT ESOPHAGITIS: ICD-10-CM

## 2022-12-01 DIAGNOSIS — M25.562 CHRONIC PAIN OF BOTH KNEES: ICD-10-CM

## 2022-12-01 DIAGNOSIS — M25.561 CHRONIC PAIN OF BOTH KNEES: ICD-10-CM

## 2022-12-01 DIAGNOSIS — M19.90 ARTHRITIS: ICD-10-CM

## 2022-12-01 DIAGNOSIS — M47.816 DEGENERATIVE JOINT DISEASE OF CERVICAL AND LUMBAR SPINE: Chronic | ICD-10-CM

## 2022-12-01 DIAGNOSIS — M54.16 LUMBAR BACK PAIN WITH RADICULOPATHY AFFECTING LOWER EXTREMITY: Primary | ICD-10-CM

## 2022-12-01 DIAGNOSIS — M47.812 DEGENERATIVE JOINT DISEASE OF CERVICAL AND LUMBAR SPINE: Chronic | ICD-10-CM

## 2022-12-01 DIAGNOSIS — G89.29 CHRONIC PAIN OF BOTH KNEES: ICD-10-CM

## 2022-12-01 RX ORDER — SOD SULF/POT CHLORIDE/MAG SULF 1.479 G
TABLET ORAL
COMMUNITY
Start: 2022-10-13 | End: 2022-12-01

## 2022-12-01 RX ORDER — TRAMADOL HYDROCHLORIDE 50 MG/1
100 TABLET ORAL EVERY 8 HOURS PRN
Qty: 180 TABLET | Refills: 0 | Status: SHIPPED | OUTPATIENT
Start: 2022-12-01 | End: 2022-12-31

## 2022-12-01 RX ORDER — PREDNISONE 20 MG/1
TABLET ORAL
Qty: 24 TABLET | Refills: 0 | Status: SHIPPED | OUTPATIENT
Start: 2022-12-01

## 2022-12-01 ASSESSMENT — ENCOUNTER SYMPTOMS
CHEST TIGHTNESS: 0
EYE PAIN: 0
COUGH: 0
SORE THROAT: 0
ABDOMINAL PAIN: 0
BACK PAIN: 1
SHORTNESS OF BREATH: 0
CONSTIPATION: 0
NAUSEA: 0
WHEEZING: 0

## 2022-12-01 NOTE — PROGRESS NOTES
Subjective:      Patient ID: Kt Carranza is a 78 y.o. female. Back pain  noted  and  knee pain  noted        Gers  stable         Leg  swelling  stable         Back and    leg  pain  noted   better  at  rest          Back Pain  This is a new problem. The current episode started in the past 7 days (11-24-22   started). The problem has been gradually worsening since onset. The pain is present in the lumbar spine. The pain is at a severity of 5/10. The pain is mild. The symptoms are aggravated by bending, position and standing. Stiffness is present All day. Pertinent negatives include no abdominal pain, chest pain, dysuria, fever, headaches, numbness or weakness. She has tried nothing for the symptoms. The treatment provided mild relief. Past Medical History:   Diagnosis Date    Acid reflux     Arthritis     Bleeding tendency (HCC)     Cancer (HCC) 1989    cervical    Gallbladder disease 2010    History of scarlet fever     Hyperlipidemia     Hypertension     Joint pain     Medication intolerance     VERY SENSITIVE TO MEDICATIONS    Medication intolerance     VERY SENSITIVE TO MEDICATIONS     Swelling       Review of Systems   Constitutional:  Negative for appetite change, fatigue and fever. HENT:  Negative for congestion, ear pain, postnasal drip and sore throat. Eyes:  Negative for pain and visual disturbance. Respiratory:  Negative for cough, chest tightness, shortness of breath and wheezing. Cardiovascular:  Negative for chest pain, palpitations and leg swelling. Gastrointestinal:  Negative for abdominal pain, constipation and nausea. Genitourinary:  Negative for dysuria and frequency. Musculoskeletal:  Positive for back pain. Negative for arthralgias, joint swelling, neck pain and neck stiffness. Skin:  Negative for rash. Neurological:  Negative for dizziness, weakness, numbness and headaches. Hematological:  Negative for adenopathy. Does not bruise/bleed easily. Psychiatric/Behavioral:  Negative for behavioral problems and sleep disturbance. The patient is not nervous/anxious. BP (!) 146/88 (Site: Right Upper Arm, Position: Sitting, Cuff Size: Medium Adult)   Pulse 80   Resp 16   Ht 5' 6\" (1.676 m)   Wt 152 lb 6 oz (69.1 kg)   BMI 24.59 kg/m²    Objective:   Physical Exam  Vitals and nursing note reviewed. Constitutional:       Appearance: She is well-developed. HENT:      Head: Normocephalic and atraumatic. Right Ear: External ear normal.      Left Ear: External ear normal.      Nose: Nose normal.   Eyes:      General: No scleral icterus. Conjunctiva/sclera: Conjunctivae normal.      Pupils: Pupils are equal, round, and reactive to light. Neck:      Thyroid: No thyromegaly. Vascular: No JVD. Cardiovascular:      Rate and Rhythm: Normal rate and regular rhythm. Heart sounds: Normal heart sounds. Pulmonary:      Effort: Pulmonary effort is normal.      Breath sounds: Normal breath sounds. No wheezing or rales. Abdominal:      General: Bowel sounds are normal. There is no distension. Palpations: Abdomen is soft. There is no mass. Tenderness: There is no abdominal tenderness. Musculoskeletal:         General: No tenderness. Normal range of motion. Cervical back: Normal range of motion and neck supple. Comments:   Pain  lower  back area  of  L4  to S1   Lymphadenopathy:      Cervical: No cervical adenopathy. Skin:     General: Skin is warm and dry. Findings: No rash. Neurological:      Mental Status: She is alert and oriented to person, place, and time. Cranial Nerves: No cranial nerve deficit. Deep Tendon Reflexes: Reflexes are normal and symmetric. Assessment:        ICD-10-CM    1. Lumbar back pain with radiculopathy affecting lower extremity  M54.16       2. Degenerative joint disease of cervical and lumbar spine  M47.812 traMADol (ULTRAM) 50 MG tablet    M47.816       3.  Chronic pain of both knees  M25.561 traMADol (ULTRAM) 50 MG tablet    M25.562     G89.29       4. Arthritis  M19.90       5. Essential hypertension  I10       6. Gastroesophageal reflux disease without esophagitis  K21.9              Plan:      Current Outpatient Medications   Medication Sig Dispense Refill    traMADol (ULTRAM) 50 MG tablet Take 2 tablets by mouth every 8 hours as needed for Pain for up to 30 days.  180 tablet 0    predniSONE (DELTASONE) 20 MG tablet One  tab po  tid   for 3 days  then  one  bid   for 5 days  and then one a day  for   5 days 24 tablet 0    vitamin B-12 (CYANOCOBALAMIN) 500 MCG tablet Take 500 mcg by mouth daily      terbinafine (LAMISIL) 250 MG tablet Take 1 tablet by mouth daily 30 tablet 1    oxaprozin (DAYPRO) 600 MG tablet TAKE 1 TABLET BY MOUTH TWICE A DAY 60 tablet 5    predniSONE (DELTASONE) 20 MG tablet Take 1 tablet, PO TID x 3 days then BID x 5 days then daily x 5 days 24 tablet 0    potassium chloride (KLOR-CON M10) 10 MEQ extended release tablet TAKE 1 TABLET BY MOUTH DAILY 30 tablet 5    esomeprazole (NEXIUM) 40 MG delayed release capsule TAKE 1 CAPSULE BY MOUTH TWICE A DAY 60 capsule 5    hydroCHLOROthiazide (HYDRODIURIL) 25 MG tablet TAKE 1 TABLET BY MOUTH EVERY OTHER DAY 45 tablet 3    furosemide (LASIX) 20 MG tablet TAKE 1 TABLET BY MOUTH DAILY AS NEEDED (LEG EDEMA) (Patient taking differently: TAKE 1 TABLET BY MOUTH EVERY OTHER DAY AND 1/2 TAB EVERY OTHER DAY) 60 tablet 1    ipratropium (ATROVENT) 0.06 % nasal spray USE 1 SPRAY BY NASAL ROUTE DAILY 2 each 5    gabapentin (NEURONTIN) 300 MG capsule TAKE 1 CAPSULE BY MOUTH 2 TIMES DAILY 60 capsule 5    sucralfate (CARAFATE) 1 GM tablet TAKE 1 TABLET BY MOUTH 4 TIMES A DAY (Patient taking differently: PRN) 120 tablet 1    zinc gluconate 50 MG tablet Take 50 mg by mouth daily      diclofenac sodium (VOLTAREN) 1 % GEL APPLY 2 G TOPICALLY 4 TIMES DAILY 100 g 5    ferrous sulfate 325 (65 Fe) MG tablet Take 325 mg by mouth daily (with breakfast)       nitroGLYCERIN (NITROLINGUAL) 0.4 MG/SPRAY spray Place 1 spray under the tongue every 5 minutes as needed for Chest pain. 1 Bottle 1    Cholecalciferol (VITAMIN D3) 3000 UNITS TABS Take 5,000 Units by mouth daily. folic acid (FOLVITE) 1 MG tablet Take 1 mg by mouth daily. estrogens, conjugated, (PREMARIN) 0.9 MG tablet Take 0.9 mg by mouth daily. Ascorbic Acid (VITAMIN C) 500 MG tablet Take 1,000 mg by mouth daily. No current facility-administered medications for this visit. No orders of the defined types were placed in this encounter. No results found for this visit on 12/01/22.    Stephen Rodríguez MD

## 2022-12-03 ENCOUNTER — NURSE ONLY (OUTPATIENT)
Dept: LAB | Age: 67
End: 2022-12-03

## 2022-12-03 DIAGNOSIS — I10 ESSENTIAL HYPERTENSION: ICD-10-CM

## 2022-12-03 LAB
ALBUMIN SERPL-MCNC: 3.6 G/DL (ref 3.5–5.1)
ALP BLD-CCNC: 64 U/L (ref 38–126)
ALT SERPL-CCNC: 15 U/L (ref 11–66)
ANION GAP SERPL CALCULATED.3IONS-SCNC: 10 MEQ/L (ref 8–16)
AST SERPL-CCNC: 29 U/L (ref 5–40)
BASOPHILS # BLD: 0.3 %
BASOPHILS ABSOLUTE: 0 THOU/MM3 (ref 0–0.1)
BILIRUB SERPL-MCNC: 0.3 MG/DL (ref 0.3–1.2)
BUN BLDV-MCNC: 21 MG/DL (ref 7–22)
CALCIUM SERPL-MCNC: 9.1 MG/DL (ref 8.5–10.5)
CHLORIDE BLD-SCNC: 98 MEQ/L (ref 98–111)
CHOLESTEROL, TOTAL: 276 MG/DL (ref 100–199)
CO2: 34 MEQ/L (ref 23–33)
CREAT SERPL-MCNC: 0.7 MG/DL (ref 0.4–1.2)
EOSINOPHIL # BLD: 0.9 %
EOSINOPHILS ABSOLUTE: 0.1 THOU/MM3 (ref 0–0.4)
ERYTHROCYTE [DISTWIDTH] IN BLOOD BY AUTOMATED COUNT: 15.3 % (ref 11.5–14.5)
ERYTHROCYTE [DISTWIDTH] IN BLOOD BY AUTOMATED COUNT: 51.1 FL (ref 35–45)
FOLATE: 17.9 NG/ML (ref 4.8–24.2)
GFR SERPL CREATININE-BSD FRML MDRD: > 60 ML/MIN/1.73M2
GLUCOSE BLD-MCNC: 75 MG/DL (ref 70–108)
HCT VFR BLD CALC: 38 % (ref 37–47)
HDLC SERPL-MCNC: 101 MG/DL
HEMOGLOBIN: 12 GM/DL (ref 12–16)
IMMATURE GRANS (ABS): 0.03 THOU/MM3 (ref 0–0.07)
IMMATURE GRANULOCYTES: 0.4 %
LDL CHOLESTEROL CALCULATED: 150 MG/DL
LYMPHOCYTES # BLD: 26.9 %
LYMPHOCYTES ABSOLUTE: 1.8 THOU/MM3 (ref 1–4.8)
MCH RBC QN AUTO: 28.8 PG (ref 26–33)
MCHC RBC AUTO-ENTMCNC: 31.6 GM/DL (ref 32.2–35.5)
MCV RBC AUTO: 91.3 FL (ref 81–99)
MONOCYTES # BLD: 8.5 %
MONOCYTES ABSOLUTE: 0.6 THOU/MM3 (ref 0.4–1.3)
NUCLEATED RED BLOOD CELLS: 0 /100 WBC
PLATELET # BLD: 295 THOU/MM3 (ref 130–400)
PMV BLD AUTO: 9.4 FL (ref 9.4–12.4)
POTASSIUM SERPL-SCNC: 2.9 MEQ/L (ref 3.5–5.2)
RBC # BLD: 4.16 MILL/MM3 (ref 4.2–5.4)
SEG NEUTROPHILS: 63 %
SEGMENTED NEUTROPHILS ABSOLUTE COUNT: 4.2 THOU/MM3 (ref 1.8–7.7)
SODIUM BLD-SCNC: 142 MEQ/L (ref 135–145)
TOTAL PROTEIN: 5.9 G/DL (ref 6.1–8)
TRIGL SERPL-MCNC: 127 MG/DL (ref 0–199)
TSH SERPL DL<=0.05 MIU/L-ACNC: 1.77 UIU/ML (ref 0.4–4.2)
WBC # BLD: 6.7 THOU/MM3 (ref 4.8–10.8)

## 2022-12-05 DIAGNOSIS — I10 ESSENTIAL HYPERTENSION: Primary | ICD-10-CM

## 2022-12-09 ENCOUNTER — TELEPHONE (OUTPATIENT)
Dept: FAMILY MEDICINE CLINIC | Age: 67
End: 2022-12-09

## 2022-12-13 NOTE — TELEPHONE ENCOUNTER
Pt called informed her of the results. Pt requested results to be printed out and sje will pick them up.     AS

## 2022-12-13 NOTE — TELEPHONE ENCOUNTER
Labs were  stable but potassium low and  needs to take potassium and repeat    Order already done so not sure if  I did not send the message     cholesterol  high but  unable  to take  meds and hgb stable     Protein  slight low and albumin and try to watch  diet

## 2022-12-16 NOTE — TELEPHONE ENCOUNTER
Pt came in and picked up orders and results. She asked if she should be taking Potassium supplement? Also when is she supposed to repeat the Potassium lab?

## 2022-12-19 NOTE — TELEPHONE ENCOUNTER
Patient stated she is taking Potassium 10meq- full tablet on Mon, Wed, Fri, Sun and half tablet on the other days.

## 2022-12-30 DIAGNOSIS — M47.812 DEGENERATIVE JOINT DISEASE OF CERVICAL AND LUMBAR SPINE: Chronic | ICD-10-CM

## 2022-12-30 DIAGNOSIS — M25.561 CHRONIC PAIN OF BOTH KNEES: ICD-10-CM

## 2022-12-30 DIAGNOSIS — G89.29 CHRONIC PAIN OF BOTH KNEES: ICD-10-CM

## 2022-12-30 DIAGNOSIS — B35.1 TINEA UNGUIUM: ICD-10-CM

## 2022-12-30 DIAGNOSIS — R60.9 PERIPHERAL EDEMA: ICD-10-CM

## 2022-12-30 DIAGNOSIS — M47.816 DEGENERATIVE JOINT DISEASE OF CERVICAL AND LUMBAR SPINE: Chronic | ICD-10-CM

## 2022-12-30 DIAGNOSIS — I10 ESSENTIAL HYPERTENSION: ICD-10-CM

## 2022-12-30 DIAGNOSIS — K21.9 GASTROESOPHAGEAL REFLUX DISEASE WITHOUT ESOPHAGITIS: ICD-10-CM

## 2022-12-30 DIAGNOSIS — M25.562 CHRONIC PAIN OF BOTH KNEES: ICD-10-CM

## 2022-12-30 RX ORDER — POTASSIUM CHLORIDE 750 MG/1
TABLET, EXTENDED RELEASE ORAL
Qty: 90 TABLET | Refills: 1 | Status: SHIPPED | OUTPATIENT
Start: 2022-12-30 | End: 2023-02-20 | Stop reason: SDUPTHER

## 2022-12-30 RX ORDER — FUROSEMIDE 20 MG/1
TABLET ORAL
Qty: 60 TABLET | Refills: 1 | Status: SHIPPED | OUTPATIENT
Start: 2022-12-30

## 2022-12-30 RX ORDER — TERBINAFINE HYDROCHLORIDE 250 MG/1
TABLET ORAL
Qty: 30 TABLET | Refills: 1 | Status: SHIPPED | OUTPATIENT
Start: 2022-12-30 | End: 2023-02-20 | Stop reason: ALTCHOICE

## 2022-12-30 RX ORDER — TRAMADOL HYDROCHLORIDE 50 MG/1
100 TABLET ORAL EVERY 8 HOURS PRN
Qty: 180 TABLET | Refills: 0 | Status: SHIPPED | OUTPATIENT
Start: 2022-12-30 | End: 2023-01-29

## 2022-12-30 RX ORDER — ESOMEPRAZOLE MAGNESIUM 40 MG/1
CAPSULE, DELAYED RELEASE ORAL
Qty: 60 CAPSULE | Refills: 5 | Status: SHIPPED | OUTPATIENT
Start: 2022-12-30

## 2022-12-30 RX ORDER — GABAPENTIN 300 MG/1
CAPSULE ORAL
Qty: 60 CAPSULE | Refills: 5 | Status: SHIPPED | OUTPATIENT
Start: 2022-12-30 | End: 2023-06-28

## 2022-12-30 NOTE — TELEPHONE ENCOUNTER
Date of last visit:  12/1/2022  Date of next visit:  2/20/2023    Requested Prescriptions     Pending Prescriptions Disp Refills    gabapentin (NEURONTIN) 300 MG capsule [Pharmacy Med Name: GABAPENTIN 300 MG CAPSULE] 60 capsule 5     Sig: TAKE 1 CAPSULE BY MOUTH TWICE A DAY    terbinafine (LAMISIL) 250 MG tablet [Pharmacy Med Name: TERBINAFINE  MG TABLET] 30 tablet 1     Sig: TAKE 1 TABLET BY MOUTH EVERY DAY    potassium chloride (KLOR-CON M10) 10 MEQ extended release tablet [Pharmacy Med Name: KLOR-CON M10 TABLET] 90 tablet 1     Sig: TAKE 1 TABLET BY MOUTH EVERY DAY

## 2022-12-30 NOTE — TELEPHONE ENCOUNTER
Date of last visit:  12/1/2022  Date of next visit:  2/20/2023    Requested Prescriptions     Pending Prescriptions Disp Refills    traMADol (ULTRAM) 50 MG tablet 180 tablet 0     Sig: Take 2 tablets by mouth every 8 hours as needed for Pain for up to 30 days.

## 2023-01-03 ENCOUNTER — OFFICE VISIT (OUTPATIENT)
Dept: FAMILY MEDICINE CLINIC | Age: 68
End: 2023-01-03

## 2023-01-03 VITALS
HEIGHT: 66 IN | BODY MASS INDEX: 24.83 KG/M2 | HEART RATE: 84 BPM | WEIGHT: 154.5 LBS | DIASTOLIC BLOOD PRESSURE: 84 MMHG | RESPIRATION RATE: 12 BRPM | SYSTOLIC BLOOD PRESSURE: 142 MMHG

## 2023-01-03 DIAGNOSIS — M79.7 FIBROSITIS OF NECK: Primary | ICD-10-CM

## 2023-01-03 DIAGNOSIS — E87.6 HYPOKALEMIA: ICD-10-CM

## 2023-01-03 DIAGNOSIS — J30.1 NON-SEASONAL ALLERGIC RHINITIS DUE TO POLLEN: ICD-10-CM

## 2023-01-03 RX ORDER — POTASSIUM CHLORIDE 750 MG/1
10 CAPSULE, EXTENDED RELEASE ORAL 2 TIMES DAILY
Qty: 60 CAPSULE | Refills: 0 | Status: SHIPPED | OUTPATIENT
Start: 2023-01-03

## 2023-01-03 RX ORDER — IPRATROPIUM BROMIDE 42 UG/1
SPRAY, METERED NASAL
Qty: 1 EACH | Refills: 5 | Status: SHIPPED | OUTPATIENT
Start: 2023-01-03

## 2023-01-03 ASSESSMENT — PATIENT HEALTH QUESTIONNAIRE - PHQ9
1. LITTLE INTEREST OR PLEASURE IN DOING THINGS: 0
SUM OF ALL RESPONSES TO PHQ QUESTIONS 1-9: 0
SUM OF ALL RESPONSES TO PHQ9 QUESTIONS 1 & 2: 0
SUM OF ALL RESPONSES TO PHQ QUESTIONS 1-9: 0
2. FEELING DOWN, DEPRESSED OR HOPELESS: 0

## 2023-01-03 ASSESSMENT — ENCOUNTER SYMPTOMS
ABDOMINAL PAIN: 0
SORE THROAT: 0
WHEEZING: 0
CHEST TIGHTNESS: 0
EYE PAIN: 0
CONSTIPATION: 0
COUGH: 0
SHORTNESS OF BREATH: 0
NAUSEA: 0

## 2023-01-03 NOTE — PROGRESS NOTES
Subjective:      Patient ID: Diann Recinos is a 78 y.o. female. Htn  stable       low  potassium  noted        Neck pain and    spasm   to the   right     some  dyspnea and  ?  potassium         Fall  Incident onset: occured  on  12-28-22 and  fall on  side. Fall occurred: at  office and  fall as  alchol  noted. Pain location: right hip   and  shoulders  noted  and  with      neck pain and back  ok. The symptoms are aggravated by rotation, extension and movement. Pertinent negatives include no abdominal pain, fever, headaches, nausea or numbness. Associated symptoms comments:    Some    right  arm pain      Right  neck pain   noted . She has tried nothing for the symptoms. The treatment provided mild relief. Past Medical History:   Diagnosis Date    Acid reflux     Arthritis     Bleeding tendency (HCC)     Cancer (HCC) 1989    cervical    Gallbladder disease 2010    History of scarlet fever     Hyperlipidemia     Hypertension     Joint pain     Medication intolerance     VERY SENSITIVE TO MEDICATIONS    Medication intolerance     VERY SENSITIVE TO MEDICATIONS     Swelling       Review of Systems   Constitutional:  Negative for appetite change, fatigue and fever. HENT:  Negative for congestion, ear pain, postnasal drip and sore throat. Eyes:  Negative for pain and visual disturbance. Respiratory:  Negative for cough, chest tightness, shortness of breath and wheezing. Cardiovascular:  Negative for chest pain, palpitations and leg swelling. Gastrointestinal:  Negative for abdominal pain, constipation and nausea. Genitourinary:  Negative for dysuria and frequency. Musculoskeletal:  Negative for arthralgias, joint swelling, neck pain and neck stiffness. Skin:  Negative for rash. Neurological:  Negative for dizziness, weakness, numbness and headaches. Hematological:  Negative for adenopathy. Does not bruise/bleed easily.    Psychiatric/Behavioral:  Negative for behavioral problems and sleep disturbance. The patient is not nervous/anxious. BP (!) 142/84 (Site: Right Upper Arm, Position: Sitting, Cuff Size: Medium Adult)   Pulse 84   Resp 12   Ht 5' 6\" (1.676 m)   Wt 154 lb 8 oz (70.1 kg)   BMI 24.94 kg/m²    Objective:   Physical Exam  Vitals and nursing note reviewed. Constitutional:       Appearance: She is well-developed. HENT:      Head: Normocephalic and atraumatic. Right Ear: External ear normal.      Left Ear: External ear normal.      Nose: Nose normal.   Eyes:      General: No scleral icterus. Conjunctiva/sclera: Conjunctivae normal.      Pupils: Pupils are equal, round, and reactive to light. Neck:      Thyroid: No thyromegaly. Vascular: No JVD. Comments:    Right    side  with cervical pain  noted   on the  right   with  spasm       Good  rom  on  the  right     Cardiovascular:      Rate and Rhythm: Normal rate and regular rhythm. Heart sounds: Normal heart sounds. Pulmonary:      Effort: Pulmonary effort is normal.      Breath sounds: Normal breath sounds. No wheezing or rales. Abdominal:      General: Bowel sounds are normal. There is no distension. Palpations: Abdomen is soft. There is no mass. Tenderness: There is no abdominal tenderness. Musculoskeletal:         General: No tenderness. Normal range of motion. Cervical back: Normal range of motion and neck supple. Lymphadenopathy:      Cervical: No cervical adenopathy. Skin:     General: Skin is warm and dry. Findings: No rash. Neurological:      Mental Status: She is alert and oriented to person, place, and time. Cranial Nerves: No cranial nerve deficit. Deep Tendon Reflexes: Reflexes are normal and symmetric. Assessment:       Diagnosis Orders   1. Fibrositis of neck        2. Non-seasonal allergic rhinitis due to pollen  ipratropium (ATROVENT) 0.06 % nasal spray      3.  Hypokalemia  potassium chloride (MICRO-K) 10 MEQ extended release capsule Plan:      Current Outpatient Medications   Medication Sig Dispense Refill    ipratropium (ATROVENT) 0.06 % nasal spray USE 1 SPRAY BY NASAL ROUTE DAILY 1 each 5    Elderberry 500 MG CAPS Take 1 tablet by mouth daily      potassium chloride (MICRO-K) 10 MEQ extended release capsule Take 1 capsule by mouth 2 times daily 60 capsule 0    gabapentin (NEURONTIN) 300 MG capsule TAKE 1 CAPSULE BY MOUTH TWICE A DAY 60 capsule 5    terbinafine (LAMISIL) 250 MG tablet TAKE 1 TABLET BY MOUTH EVERY DAY 30 tablet 1    potassium chloride (KLOR-CON M10) 10 MEQ extended release tablet TAKE 1 TABLET BY MOUTH EVERY DAY 90 tablet 1    furosemide (LASIX) 20 MG tablet TAKE 1 TABLET BY MOUTH DAILY AS NEEDED (LEG EDEMA) 60 tablet 1    esomeprazole (NEXIUM) 40 MG delayed release capsule TAKE 1 CAPSULE BY MOUTH TWICE A DAY 60 capsule 5    traMADol (ULTRAM) 50 MG tablet Take 2 tablets by mouth every 8 hours as needed for Pain for up to 30 days. 180 tablet 0    vitamin B-12 (CYANOCOBALAMIN) 500 MCG tablet Take 500 mcg by mouth daily      oxaprozin (DAYPRO) 600 MG tablet TAKE 1 TABLET BY MOUTH TWICE A DAY 60 tablet 5    hydroCHLOROthiazide (HYDRODIURIL) 25 MG tablet TAKE 1 TABLET BY MOUTH EVERY OTHER DAY 45 tablet 3    sucralfate (CARAFATE) 1 GM tablet TAKE 1 TABLET BY MOUTH 4 TIMES A DAY (Patient taking differently: PRN) 120 tablet 1    zinc gluconate 50 MG tablet Take 50 mg by mouth daily      diclofenac sodium (VOLTAREN) 1 % GEL APPLY 2 G TOPICALLY 4 TIMES DAILY 100 g 5    ferrous sulfate 325 (65 Fe) MG tablet Take 325 mg by mouth every other day      nitroGLYCERIN (NITROLINGUAL) 0.4 MG/SPRAY spray Place 1 spray under the tongue every 5 minutes as needed for Chest pain. 1 Bottle 1    Cholecalciferol (VITAMIN D3) 3000 UNITS TABS Take 5,000 Units by mouth daily. folic acid (FOLVITE) 1 MG tablet Take 1 mg by mouth daily. estrogens, conjugated, (PREMARIN) 0.9 MG tablet Take 0.9 mg by mouth daily.         Ascorbic Acid (VITAMIN C) 500 MG tablet Take 1,000 mg by mouth daily. No current facility-administered medications for this visit.                Right  neck pain  noted     pain patch   lidoderm     4%     Montana Preston MD

## 2023-01-21 ENCOUNTER — NURSE ONLY (OUTPATIENT)
Dept: LAB | Age: 68
End: 2023-01-21

## 2023-01-21 DIAGNOSIS — I10 ESSENTIAL HYPERTENSION: ICD-10-CM

## 2023-01-21 LAB — POTASSIUM SERPL-SCNC: 3.1 MEQ/L (ref 3.5–5.2)

## 2023-01-22 NOTE — TELEPHONE ENCOUNTER
Date of last visit:  12/9/2020  Date of next visit:  3/10/2021    Requested Prescriptions     Pending Prescriptions Disp Refills    valsartan (DIOVAN) 40 MG tablet [Pharmacy Med Name: VALSARTAN 40 MG TABLET] 30 tablet 3     Sig: TAKE 1 TABLET BY MOUTH DAILY    diclofenac sodium (VOLTAREN) 1 % GEL [Pharmacy Med Name: DICLOFENAC SODIUM 1% GEL] 100 g 5     Sig: APPLY 2 G TOPICALLY 4 TIMES DAILY I have reviewed and confirmed nurses' notes for patient's medications, allergies, medical history, and surgical history.

## 2023-01-23 ENCOUNTER — TELEPHONE (OUTPATIENT)
Dept: FAMILY MEDICINE CLINIC | Age: 68
End: 2023-01-23

## 2023-01-23 DIAGNOSIS — E87.6 HYPOKALEMIA: Primary | ICD-10-CM

## 2023-01-23 NOTE — TELEPHONE ENCOUNTER
----- Message from Rosibel Alonzo MD sent at 1/22/2023  3:40 PM EST -----  Potassium better but still low      Is she able to take more  potassium  ?   Please call 56y Male with pmhx of HTN, DM2 c/b DKA, neuropathy, chronic pain on Methadone & opioids for cervical spine and back injury s/p fusion C2-6 along with LUE nerve transplant, MI, Afib on AC 56y Male with pmhx of HTN, DM2 c/b DKA, neuropathy, chronic pain on Methadone & opioids for cervical spine and back injury s/p fusion C2-6 along with LUE nerve transplant, MI, Afib on Eliquis presents to the ED with complaints of shortness of breath x4 days, fatigue, no appetite and not being able to take his medication for the past week due to nausea and vomiting.  Patient recently tested COVID-positive last week.  Reports he also has not been able to take his diabetes medications.

## 2023-01-24 NOTE — TELEPHONE ENCOUNTER
Corinne informed by Phone. She stated she will try to take more Potassium however she does not think it will go well. But she is willing to try to increase it on her own.

## 2023-01-30 DIAGNOSIS — E87.6 HYPOKALEMIA: ICD-10-CM

## 2023-01-30 NOTE — TELEPHONE ENCOUNTER
Date of last visit:  1/3/2023  Date of next visit:  2/20/2023    Requested Prescriptions     Pending Prescriptions Disp Refills    potassium chloride (MICRO-K) 10 MEQ extended release capsule [Pharmacy Med Name: POTASSIUM CL ER 10 MEQ CAPSULE] 60 capsule 0     Sig: TAKE 1 CAPSULE BY MOUTH TWICE A DAY

## 2023-01-31 DIAGNOSIS — M47.812 DEGENERATIVE JOINT DISEASE OF CERVICAL AND LUMBAR SPINE: Chronic | ICD-10-CM

## 2023-01-31 DIAGNOSIS — M25.562 CHRONIC PAIN OF BOTH KNEES: ICD-10-CM

## 2023-01-31 DIAGNOSIS — M25.561 CHRONIC PAIN OF BOTH KNEES: ICD-10-CM

## 2023-01-31 DIAGNOSIS — G89.29 CHRONIC PAIN OF BOTH KNEES: ICD-10-CM

## 2023-01-31 DIAGNOSIS — M47.816 DEGENERATIVE JOINT DISEASE OF CERVICAL AND LUMBAR SPINE: Chronic | ICD-10-CM

## 2023-01-31 RX ORDER — POTASSIUM CHLORIDE 750 MG/1
CAPSULE, EXTENDED RELEASE ORAL
Qty: 60 CAPSULE | Refills: 2 | Status: SHIPPED | OUTPATIENT
Start: 2023-01-31

## 2023-01-31 NOTE — TELEPHONE ENCOUNTER
Date of last visit:  1/3/2023  Date of next visit:  2/20/2023    Requested Prescriptions     Pending Prescriptions Disp Refills    traMADol (ULTRAM) 50 MG tablet 180 tablet 0     Sig: Take 2 tablets by mouth every 8 hours as needed for Pain for up to 30 days.

## 2023-02-01 RX ORDER — TRAMADOL HYDROCHLORIDE 50 MG/1
100 TABLET ORAL EVERY 8 HOURS PRN
Qty: 180 TABLET | Refills: 0 | Status: SHIPPED | OUTPATIENT
Start: 2023-02-01 | End: 2023-03-03

## 2023-02-06 ENCOUNTER — NURSE ONLY (OUTPATIENT)
Dept: LAB | Age: 68
End: 2023-02-06

## 2023-02-06 DIAGNOSIS — E87.6 HYPOKALEMIA: ICD-10-CM

## 2023-02-06 LAB — POTASSIUM SERPL-SCNC: 3.3 MEQ/L (ref 3.5–5.2)

## 2023-02-07 ENCOUNTER — TELEPHONE (OUTPATIENT)
Dept: FAMILY MEDICINE CLINIC | Age: 68
End: 2023-02-07

## 2023-02-07 NOTE — TELEPHONE ENCOUNTER
----- Message from Keyur Mendez MD sent at 2/6/2023 11:06 PM EST -----  Call as potassium better at 3.3  same  dose continue

## 2023-02-13 ENCOUNTER — OFFICE VISIT (OUTPATIENT)
Dept: CARDIOLOGY CLINIC | Age: 68
End: 2023-02-13
Payer: MEDICARE

## 2023-02-13 VITALS
DIASTOLIC BLOOD PRESSURE: 84 MMHG | BODY MASS INDEX: 25.52 KG/M2 | SYSTOLIC BLOOD PRESSURE: 152 MMHG | WEIGHT: 158.8 LBS | HEIGHT: 66 IN | HEART RATE: 76 BPM

## 2023-02-13 DIAGNOSIS — E78.01 FAMILIAL HYPERCHOLESTEROLEMIA: ICD-10-CM

## 2023-02-13 DIAGNOSIS — I10 ESSENTIAL HYPERTENSION: Primary | ICD-10-CM

## 2023-02-13 PROCEDURE — G8417 CALC BMI ABV UP PARAM F/U: HCPCS | Performed by: NUCLEAR MEDICINE

## 2023-02-13 PROCEDURE — 3017F COLORECTAL CA SCREEN DOC REV: CPT | Performed by: NUCLEAR MEDICINE

## 2023-02-13 PROCEDURE — 99213 OFFICE O/P EST LOW 20 MIN: CPT | Performed by: NUCLEAR MEDICINE

## 2023-02-13 PROCEDURE — G8427 DOCREV CUR MEDS BY ELIG CLIN: HCPCS | Performed by: NUCLEAR MEDICINE

## 2023-02-13 PROCEDURE — 1090F PRES/ABSN URINE INCON ASSESS: CPT | Performed by: NUCLEAR MEDICINE

## 2023-02-13 PROCEDURE — 1036F TOBACCO NON-USER: CPT | Performed by: NUCLEAR MEDICINE

## 2023-02-13 PROCEDURE — 93000 ELECTROCARDIOGRAM COMPLETE: CPT | Performed by: NUCLEAR MEDICINE

## 2023-02-13 PROCEDURE — 1123F ACP DISCUSS/DSCN MKR DOCD: CPT | Performed by: NUCLEAR MEDICINE

## 2023-02-13 PROCEDURE — G8482 FLU IMMUNIZE ORDER/ADMIN: HCPCS | Performed by: NUCLEAR MEDICINE

## 2023-02-13 PROCEDURE — 3077F SYST BP >= 140 MM HG: CPT | Performed by: NUCLEAR MEDICINE

## 2023-02-13 PROCEDURE — 3079F DIAST BP 80-89 MM HG: CPT | Performed by: NUCLEAR MEDICINE

## 2023-02-13 PROCEDURE — G8400 PT W/DXA NO RESULTS DOC: HCPCS | Performed by: NUCLEAR MEDICINE

## 2023-02-13 NOTE — PROGRESS NOTES
[address] [place] [address].  [address]  [facility] [address]  Dept: [phone]  Dept Fax: [phone]  Loc: [phone]    Visit Date: 2/13/2023    Stacy Matta is a 78 y.o. female who presents todayfor:  Chief Complaint   Patient presents with    1 Year Follow Up    Hypertension    Hyperlipidemia     Known HTn and hyperlipidemia  Difficult to control the HTN   Meds tolerance issues  No dizziness  No syncope  No chest pain       HPI:  HPI  Past Medical History:   Diagnosis Date    Acid reflux     Arthritis     Bleeding tendency (HCC)     Cancer ([address] 1989    cervical    Gallbladder disease 2010    History of scarlet fever     Hyperlipidemia     Hypertension     Joint pain     Medication intolerance     VERY SENSITIVE TO MEDICATIONS    Medication intolerance     VERY SENSITIVE TO MEDICATIONS     Swelling       Past Surgical History:   Procedure Laterality Date    BACK SURGERY      BACK SURGERY  01/11/2019    Dr Elena Monreal @ [facility]       L1  to  L5  with    rods     BLADDER REPAIR  2006    CARDIAC CATHETERIZATION  2009     wnl  and  dr. Rose Gave TEST  07/30/2009    EF 55% C NO OBVIOUS ISCHEMIA    CARDIOVASCULAR STRESS TEST  09/02/2005    EF 66% C MILD APICAL STRESS INDUCED ISCHEMIA    CERVICAL FUSION  11-22-13    Dr. Elena Monreal @ [facility], 4-5, 5-6, 6-7     COLONOSCOPY  2018      normal  and good   10 year s    HYSTERECTOMY (CERVIX STATUS UNKNOWN)  1989    [facility]  [facility]    JOINT REPLACEMENT Right 06/13/2017    2nd stage revision     KNEE ARTHROPLASTY  2010    LT    KNEE ARTHROSCOPY  [id number]    KNEE ARTHROSCOPY Right 8-1-14    Meniscus Tear By Dr Karen Valencia @ [facility]     KNEE ARTHROSCOPY Right 9-16-16 and 10-25-16    Dr Karen Valencia @ [facility] Right 05/02/2017    Antibiotic Spacer Placement - Dr. Calvin Richey  1-10-15    Dr Elena Monreal @ [address] Left 01/2010    analisa MCKENNA ECHOCARDIOGRAM  2009    EF 50% MILD LT VENTRICULAR HYPERTROPHY,TRIVIAL MR AND TR, TRIVIAL AORTIC INSUFFICIENCY,DIASTOLIC DYSFUNCTION OF THE LT VENTRICLE    TRANSTHORACIC ECHOCARDIOGRAM  2005    EF 60% C TRACE MR AND MILD TR    UPPER GASTROINTESTINAL ENDOSCOPY      [facility]     Family History   Problem Relation Age of Onset    Heart Disease Mother     Hypertension Mother     Heart Disease Father     Hypertension Father     Stroke Father      Social History     Tobacco Use    Smoking status: Former     Packs/day: 1.00     Years: 3.00     Pack years: 3.00     Types: Cigarettes     Quit date: 1975     Years since quittin.2    Smokeless tobacco: Never   Substance Use Topics    Alcohol use: No      Current Outpatient Medications   Medication Sig Dispense Refill    traMADol (ULTRAM) 50 MG tablet Take 2 tablets by mouth every 8 hours as needed for Pain for up to 30 days.  180 tablet 0    potassium chloride (MICRO-K) 10 MEQ extended release capsule TAKE 1 CAPSULE BY MOUTH TWICE A DAY 60 capsule 2    ipratropium (ATROVENT) 0.06 % nasal spray USE 1 SPRAY BY NASAL ROUTE DAILY 1 each 5    Elderberry 500 MG CAPS Take 1 tablet by mouth daily      gabapentin (NEURONTIN) 300 MG capsule TAKE 1 CAPSULE BY MOUTH TWICE A DAY 60 capsule 5    terbinafine (LAMISIL) 250 MG tablet TAKE 1 TABLET BY MOUTH EVERY DAY 30 tablet 1    potassium chloride (KLOR-CON M10) 10 MEQ extended release tablet TAKE 1 TABLET BY MOUTH EVERY DAY 90 tablet 1    furosemide (LASIX) 20 MG tablet TAKE 1 TABLET BY MOUTH DAILY AS NEEDED (LEG EDEMA) 60 tablet 1    esomeprazole (NEXIUM) 40 MG delayed release capsule TAKE 1 CAPSULE BY MOUTH TWICE A DAY 60 capsule 5    vitamin B-12 (CYANOCOBALAMIN) 500 MCG tablet Take 500 mcg by mouth daily      oxaprozin (DAYPRO) 600 MG tablet TAKE 1 TABLET BY MOUTH TWICE A DAY 60 tablet 5    hydroCHLOROthiazide (HYDRODIURIL) 25 MG tablet TAKE 1 TABLET BY MOUTH EVERY OTHER DAY 45 tablet 3    sucralfate (CARAFATE) 1 GM tablet TAKE 1 TABLET BY MOUTH 4 TIMES A DAY (Patient taking differently: PRN) 120 tablet 1    zinc gluconate 50 MG tablet Take 50 mg by mouth daily      diclofenac sodium (VOLTAREN) 1 % GEL APPLY 2 G TOPICALLY 4 TIMES DAILY 100 g 5    ferrous sulfate 325 (65 Fe) MG tablet Take 325 mg by mouth every other day      nitroGLYCERIN (NITROLINGUAL) 0.4 MG/SPRAY spray Place 1 spray under the tongue every 5 minutes as needed for Chest pain. 1 Bottle 1    Cholecalciferol (VITAMIN D3) 3000 UNITS TABS Take 5,000 Units by mouth daily. folic acid (FOLVITE) 1 MG tablet Take 1 mg by mouth daily. estrogens, conjugated, (PREMARIN) 0.9 MG tablet Take 0.9 mg by mouth daily. Ascorbic Acid (VITAMIN C) 500 MG tablet Take 1,000 mg by mouth daily. No current facility-administered medications for this visit.      Allergies   Allergen Reactions    Aldactone [Spironolactone] Other (See Comments)     CAUSES HEADACHES    Amlodipine Besy-Benazepril Hcl Other (See Comments)     CAUSES JOINT PAIN    Amlodipine-Atorvastatin Other (See Comments)     CAUSES INCREASED CRP     Bactrim     Cardizem [Diltiazem Hcl] Other (See Comments)     CAUSES LETHARGY AND LIGHT HEADED    Feldene [Piroxicam]     Motrin [Ibuprofen Micronized]     Naproxen     Potassium-Containing Compounds Other (See Comments)     CAUSES ACID REFLUX    Statins [Statins]      muscle    Vioxx     Zetia [Ezetimibe] Nausea Only and Other (See Comments)     CAUSES REFLUX    Macrobid [Nitrofurantoin] Nausea And Vomiting    Plaquenil [Hydroxychloroquine Sulfate] Rash     Health Maintenance   Topic Date Due    Shingles vaccine (1 of 2) Never done    Annual Wellness Visit (AWV)  Never done    Pneumococcal 65+ years Vaccine (1 - PCV) Never done    Breast cancer screen  03/02/2023    Depression Screen  01/03/2024    Lipids  12/03/2027    Colorectal Cancer Screen  11/06/2028    DTaP/Tdap/Td vaccine (2 - Td or Tdap) 05/12/2032    DEXA (modify frequency per FRAX score) Completed    Flu vaccine  Completed    COVID-19 Vaccine  Completed    Hepatitis C screen  Completed    Hepatitis A vaccine  Aged Out    Hib vaccine  Aged Out    Meningococcal (ACWY) vaccine  Aged Out       Subjective:  General:   No fever, no chills, No fatigue or weight loss  Pulmonary:    No dyspnea, no wheezing  Cardiac:    Denies recent chest pain,   GI:     No nausea or vomiting, no abdominal pain  Neuro:    No dizziness or light headedness,   Musculoskeletal:  No recent active issues  Extremities:   No edema, no obvious claudication       Objective:  General:   Well developed, well nourished  Lungs:   Clear to auscultation  Heart:    Normal S1 S2, Slight murmur. no rubs, no gallops  Abdomen:   Soft, non tender, no organomegalies, positive bowel sounds  Extremities:   No edema, no cyanosis, good peripheral pulses  Neurological:   Awake, alert, oriented. No obvious focal deficits  Musculoskelatal:  No obvious deformities   BP (!) 152/84   Pulse 76   Ht 5' 6\" (1.676 m)   Wt 158 lb 12.8 oz (72 kg)   BMI 25.63 kg/m²     Assessment:      Diagnosis Orders   1. Essential hypertension  EKG 12 lead      2. Familial hypercholesterolemia        Gas above  Cardiac fair for now  ECG in office was done today. I reviewed the ECG. No acute findings      Plan:  No follow-ups on file. As above  Cardiac as above  Continue risk factor modification and medical management  Thank you for allowing me to participate in the care of your patient. Please don't hesitate to contact me regarding any further issues related to the patient care    Orders Placed:  Orders Placed This Encounter   Procedures    EKG 12 lead     Order Specific Question:   Reason for Exam?     Answer: Other       Prescribed:  No orders of the defined types were placed in this encounter. Discussed use, benefit, and side effects of prescribed medications. All patient questions answered. Pt voicedunderstanding.  Instructed to continue current medications, diet and exercise. Continue risk factor modification and medical management. Patient agreed with treatment plan. Follow up as directed.     Electronically signedby Sung Denise MD on 2/13/2023 at 9:44 AM

## 2023-02-20 ENCOUNTER — OFFICE VISIT (OUTPATIENT)
Dept: FAMILY MEDICINE CLINIC | Age: 68
End: 2023-02-20

## 2023-02-20 VITALS
WEIGHT: 158 LBS | BODY MASS INDEX: 25.39 KG/M2 | HEART RATE: 72 BPM | RESPIRATION RATE: 16 BRPM | SYSTOLIC BLOOD PRESSURE: 136 MMHG | HEIGHT: 66 IN | DIASTOLIC BLOOD PRESSURE: 72 MMHG

## 2023-02-20 DIAGNOSIS — M47.812 DEGENERATIVE JOINT DISEASE OF CERVICAL AND LUMBAR SPINE: Chronic | ICD-10-CM

## 2023-02-20 DIAGNOSIS — G89.29 CHRONIC PAIN OF BOTH KNEES: ICD-10-CM

## 2023-02-20 DIAGNOSIS — J30.1 NON-SEASONAL ALLERGIC RHINITIS DUE TO POLLEN: ICD-10-CM

## 2023-02-20 DIAGNOSIS — M25.562 CHRONIC PAIN OF BOTH KNEES: ICD-10-CM

## 2023-02-20 DIAGNOSIS — M47.816 DEGENERATIVE JOINT DISEASE OF CERVICAL AND LUMBAR SPINE: Chronic | ICD-10-CM

## 2023-02-20 DIAGNOSIS — E87.6 HYPOKALEMIA: ICD-10-CM

## 2023-02-20 DIAGNOSIS — I73.00 RAYNAUD'S PHENOMENON WITHOUT GANGRENE: ICD-10-CM

## 2023-02-20 DIAGNOSIS — I10 ESSENTIAL HYPERTENSION: Primary | ICD-10-CM

## 2023-02-20 DIAGNOSIS — M25.561 CHRONIC PAIN OF BOTH KNEES: ICD-10-CM

## 2023-02-20 DIAGNOSIS — K21.9 GASTROESOPHAGEAL REFLUX DISEASE WITHOUT ESOPHAGITIS: ICD-10-CM

## 2023-02-20 DIAGNOSIS — M54.16 LUMBAR BACK PAIN WITH RADICULOPATHY AFFECTING LOWER EXTREMITY: ICD-10-CM

## 2023-02-20 PROCEDURE — 1036F TOBACCO NON-USER: CPT | Performed by: FAMILY MEDICINE

## 2023-02-20 PROCEDURE — 3017F COLORECTAL CA SCREEN DOC REV: CPT | Performed by: FAMILY MEDICINE

## 2023-02-20 PROCEDURE — G8400 PT W/DXA NO RESULTS DOC: HCPCS | Performed by: FAMILY MEDICINE

## 2023-02-20 PROCEDURE — G8427 DOCREV CUR MEDS BY ELIG CLIN: HCPCS | Performed by: FAMILY MEDICINE

## 2023-02-20 PROCEDURE — 1123F ACP DISCUSS/DSCN MKR DOCD: CPT | Performed by: FAMILY MEDICINE

## 2023-02-20 PROCEDURE — 99213 OFFICE O/P EST LOW 20 MIN: CPT | Performed by: FAMILY MEDICINE

## 2023-02-20 PROCEDURE — 1090F PRES/ABSN URINE INCON ASSESS: CPT | Performed by: FAMILY MEDICINE

## 2023-02-20 PROCEDURE — G8417 CALC BMI ABV UP PARAM F/U: HCPCS | Performed by: FAMILY MEDICINE

## 2023-02-20 RX ORDER — IPRATROPIUM BROMIDE 42 UG/1
SPRAY, METERED NASAL
Qty: 1 EACH | Refills: 5 | Status: SHIPPED | OUTPATIENT
Start: 2023-02-20

## 2023-02-20 RX ORDER — POTASSIUM CHLORIDE 750 MG/1
CAPSULE, EXTENDED RELEASE ORAL
Qty: 60 CAPSULE | Refills: 5 | Status: SHIPPED | OUTPATIENT
Start: 2023-02-20

## 2023-02-20 RX ORDER — PREDNISONE 20 MG/1
TABLET ORAL
Qty: 24 TABLET | Refills: 0 | Status: SHIPPED | OUTPATIENT
Start: 2023-02-20

## 2023-02-20 SDOH — ECONOMIC STABILITY: HOUSING INSECURITY
IN THE LAST 12 MONTHS, WAS THERE A TIME WHEN YOU DID NOT HAVE A STEADY PLACE TO SLEEP OR SLEPT IN A SHELTER (INCLUDING NOW)?: NO

## 2023-02-20 SDOH — ECONOMIC STABILITY: FOOD INSECURITY: WITHIN THE PAST 12 MONTHS, YOU WORRIED THAT YOUR FOOD WOULD RUN OUT BEFORE YOU GOT MONEY TO BUY MORE.: NEVER TRUE

## 2023-02-20 SDOH — ECONOMIC STABILITY: FOOD INSECURITY: WITHIN THE PAST 12 MONTHS, THE FOOD YOU BOUGHT JUST DIDN'T LAST AND YOU DIDN'T HAVE MONEY TO GET MORE.: NEVER TRUE

## 2023-02-20 SDOH — ECONOMIC STABILITY: INCOME INSECURITY: HOW HARD IS IT FOR YOU TO PAY FOR THE VERY BASICS LIKE FOOD, HOUSING, MEDICAL CARE, AND HEATING?: NOT HARD AT ALL

## 2023-02-20 ASSESSMENT — ENCOUNTER SYMPTOMS
ABDOMINAL PAIN: 0
WHEEZING: 0
EYE PAIN: 0
COUGH: 0
CHEST TIGHTNESS: 0
CONSTIPATION: 0
SHORTNESS OF BREATH: 0
NAUSEA: 0
SORE THROAT: 0

## 2023-02-20 NOTE — PROGRESS NOTES
Subjective:      Patient ID: Isauro Sierra is a 78 y.o. female. Chronic  pain  noted   and  with pain   of the  knees      Pdn  for  knees      Back and  knee pain   noted          Hypertension  This is a chronic problem. The current episode started more than 1 year ago. The problem has been resolved since onset. The problem is controlled. Associated symptoms include neck pain. Pertinent negatives include no chest pain, headaches, palpitations, peripheral edema or shortness of breath. The current treatment provides significant improvement. There are no compliance problems. Neck Pain   Pertinent negatives include no chest pain, fever, headaches, numbness or weakness. Gastroesophageal Reflux  She reports no abdominal pain, no chest pain, no coughing, no nausea, no sore throat or no wheezing. This is a chronic problem. The current episode started more than 1 year ago. The problem occurs rarely. The problem has been resolved. The symptoms are aggravated by certain foods. Pertinent negatives include no fatigue or melena. She has tried a PPI for the symptoms. The treatment provided significant relief. Past Medical History:   Diagnosis Date    Acid reflux     Arthritis     Bleeding tendency (HCC)     Cancer (HCC) 1989    cervical    Gallbladder disease 2010    History of scarlet fever     Hyperlipidemia     Hypertension     Joint pain     Medication intolerance     VERY SENSITIVE TO MEDICATIONS    Medication intolerance     VERY SENSITIVE TO MEDICATIONS     Swelling       Review of Systems   Constitutional:  Negative for appetite change, fatigue and fever. HENT:  Negative for congestion, ear pain, postnasal drip and sore throat. Eyes:  Negative for pain and visual disturbance. Respiratory:  Negative for cough, chest tightness, shortness of breath and wheezing. Cardiovascular:  Negative for chest pain, palpitations and leg swelling.    Gastrointestinal:  Negative for abdominal pain, constipation, melena and nausea. Genitourinary:  Negative for dysuria and frequency. Musculoskeletal:  Positive for neck pain. Negative for arthralgias, joint swelling and neck stiffness. Back and  neck pain   Skin:  Negative for rash. Neurological:  Negative for dizziness, weakness, numbness and headaches. Hematological:  Negative for adenopathy. Does not bruise/bleed easily. Psychiatric/Behavioral:  Negative for behavioral problems and sleep disturbance. The patient is not nervous/anxious. /72 (Site: Right Upper Arm, Position: Sitting, Cuff Size: Medium Adult)   Pulse 72   Resp 16   Ht 5' 6\" (1.676 m)   Wt 158 lb (71.7 kg)   BMI 25.50 kg/m²    Objective:   Physical Exam  Vitals and nursing note reviewed. Constitutional:       Appearance: She is well-developed. HENT:      Head: Normocephalic and atraumatic. Right Ear: External ear normal.      Left Ear: External ear normal.      Nose: Nose normal.   Eyes:      General: No scleral icterus. Conjunctiva/sclera: Conjunctivae normal.      Pupils: Pupils are equal, round, and reactive to light. Neck:      Thyroid: No thyromegaly. Vascular: No JVD. Cardiovascular:      Rate and Rhythm: Normal rate and regular rhythm. Heart sounds: Normal heart sounds. Pulmonary:      Effort: Pulmonary effort is normal.      Breath sounds: Normal breath sounds. No wheezing or rales. Abdominal:      General: Bowel sounds are normal. There is no distension. Palpations: Abdomen is soft. There is no mass. Tenderness: There is no abdominal tenderness. Musculoskeletal:         General: No tenderness. Normal range of motion. Cervical back: Normal range of motion and neck supple. Right lower leg: Edema present. Left lower leg: Edema present. Comments:   2  +  noted   of the  legs      Knee pain    noted left  more  than  right    Lymphadenopathy:      Cervical: No cervical adenopathy.    Skin:     General: Skin is warm and dry. Findings: No rash. Neurological:      Mental Status: She is alert and oriented to person, place, and time. Cranial Nerves: No cranial nerve deficit. Deep Tendon Reflexes: Reflexes are normal and symmetric. Assessment:        ICD-10-CM    1. Essential hypertension  I10       2. Non-seasonal allergic rhinitis due to pollen  J30.1 ipratropium (ATROVENT) 0.06 % nasal spray      3. Hypokalemia  E87.6 potassium chloride (MICRO-K) 10 MEQ extended release capsule      4. Degenerative joint disease of cervical and lumbar spine  M47.812     M47.816       5. Chronic pain of both knees  M25.561     M25.562     G89.29       6. Gastroesophageal reflux disease without esophagitis  K21.9       7. Raynaud's phenomenon without gangrene  I73.00       8. Lumbar back pain with radiculopathy affecting lower extremity  M54.16 predniSONE (DELTASONE) 20 MG tablet             Plan:      Current Outpatient Medications   Medication Sig Dispense Refill    ipratropium (ATROVENT) 0.06 % nasal spray USE 1 SPRAY BY NASAL ROUTE DAILY 1 each 5    potassium chloride (MICRO-K) 10 MEQ extended release capsule TAKE 1 CAPSULE BY MOUTH TWICE A DAY 60 capsule 5    predniSONE (DELTASONE) 20 MG tablet One  tab po  tid   for 3 days  then  one  bid   for 5 days  and then one a day  for   5 days 24 tablet 0    traMADol (ULTRAM) 50 MG tablet Take 2 tablets by mouth every 8 hours as needed for Pain for up to 30 days.  180 tablet 0    Elderberry 500 MG CAPS Take 1 tablet by mouth daily      gabapentin (NEURONTIN) 300 MG capsule TAKE 1 CAPSULE BY MOUTH TWICE A DAY 60 capsule 5    furosemide (LASIX) 20 MG tablet TAKE 1 TABLET BY MOUTH DAILY AS NEEDED (LEG EDEMA) 60 tablet 1    esomeprazole (NEXIUM) 40 MG delayed release capsule TAKE 1 CAPSULE BY MOUTH TWICE A DAY 60 capsule 5    vitamin B-12 (CYANOCOBALAMIN) 500 MCG tablet Take 500 mcg by mouth daily      oxaprozin (DAYPRO) 600 MG tablet TAKE 1 TABLET BY MOUTH TWICE A DAY 60 tablet 5    hydroCHLOROthiazide (HYDRODIURIL) 25 MG tablet TAKE 1 TABLET BY MOUTH EVERY OTHER DAY 45 tablet 3    sucralfate (CARAFATE) 1 GM tablet TAKE 1 TABLET BY MOUTH 4 TIMES A DAY (Patient taking differently: PRN) 120 tablet 1    zinc gluconate 50 MG tablet Take 50 mg by mouth daily      diclofenac sodium (VOLTAREN) 1 % GEL APPLY 2 G TOPICALLY 4 TIMES DAILY 100 g 5    ferrous sulfate 325 (65 Fe) MG tablet Take 325 mg by mouth every other day      nitroGLYCERIN (NITROLINGUAL) 0.4 MG/SPRAY spray Place 1 spray under the tongue every 5 minutes as needed for Chest pain. 1 Bottle 1    Cholecalciferol (VITAMIN D3) 3000 UNITS TABS Take 5,000 Units by mouth daily. folic acid (FOLVITE) 1 MG tablet Take 1 mg by mouth daily. estrogens, conjugated, (PREMARIN) 0.9 MG tablet Take 0.9 mg by mouth daily. Ascorbic Acid (VITAMIN C) 500 MG tablet Take 1,000 mg by mouth daily. No current facility-administered medications for this visit.       See in  3 mths  as  chronic  tramadol       Potassium  noted   Joshua Anderson MD

## 2023-03-07 DIAGNOSIS — M47.812 DEGENERATIVE JOINT DISEASE OF CERVICAL AND LUMBAR SPINE: Chronic | ICD-10-CM

## 2023-03-07 DIAGNOSIS — G89.29 CHRONIC PAIN OF BOTH KNEES: ICD-10-CM

## 2023-03-07 DIAGNOSIS — M25.561 CHRONIC PAIN OF BOTH KNEES: ICD-10-CM

## 2023-03-07 DIAGNOSIS — M47.816 DEGENERATIVE JOINT DISEASE OF CERVICAL AND LUMBAR SPINE: Chronic | ICD-10-CM

## 2023-03-07 DIAGNOSIS — M25.562 CHRONIC PAIN OF BOTH KNEES: ICD-10-CM

## 2023-03-07 RX ORDER — TRAMADOL HYDROCHLORIDE 50 MG/1
100 TABLET ORAL EVERY 8 HOURS PRN
Qty: 180 TABLET | Refills: 0 | Status: SHIPPED | OUTPATIENT
Start: 2023-03-07 | End: 2023-04-06

## 2023-03-07 NOTE — TELEPHONE ENCOUNTER
Date of last visit:  2/20/2023  Date of next visit:  5/22/2023    Requested Prescriptions     Pending Prescriptions Disp Refills    traMADol (ULTRAM) 50 MG tablet 180 tablet 0     Sig: Take 2 tablets by mouth every 8 hours as needed for Pain for up to 30 days.

## 2023-04-03 ENCOUNTER — TELEPHONE (OUTPATIENT)
Dept: FAMILY MEDICINE CLINIC | Age: 68
End: 2023-04-03

## 2023-04-03 NOTE — TELEPHONE ENCOUNTER
Kenyatta SOLIZ Case ID: [id number]  Need help?  Call us at [phone]  Status: Sent to Plan today  Drug: NEXIUM 40 MG delayed-release capsules  Form: [employer] Electronic PA Form (9475 NCPDP)

## 2023-04-05 ENCOUNTER — TELEPHONE (OUTPATIENT)
Dept: FAMILY MEDICINE CLINIC | Age: 68
End: 2023-04-05

## 2023-04-05 DIAGNOSIS — K21.9 GASTROESOPHAGEAL REFLUX DISEASE WITHOUT ESOPHAGITIS: ICD-10-CM

## 2023-04-05 RX ORDER — ESOMEPRAZOLE MAGNESIUM 40 MG/1
CAPSULE, DELAYED RELEASE ORAL
Qty: 60 CAPSULE | Refills: 5 | Status: SHIPPED | OUTPATIENT
Start: 2023-04-05

## 2023-04-05 NOTE — TELEPHONE ENCOUNTER
Lorelei Lal   Key: [id number]   PA Case ID: [id number]  Need help?  Call us at [phone]  Status: Sent to Plan today  Drug: Esomeprazole Magnesium 40MG dr capsules  Form: [employer] Electronic PA Form (7580 NCPDP)

## 2023-04-05 NOTE — TELEPHONE ENCOUNTER
Date of last visit:  2/20/2023  Date of next visit:  5/22/2023    Requested Prescriptions     Signed Prescriptions Disp Refills    esomeprazole (NEXIUM) 40 MG delayed release capsule 60 capsule 5     Sig: TAKE 1 CAPSULE BY MOUTH TWICE A DAY     Authorizing Provider: Gilma Michelle     Ordering User: Leslie Reza         Original PA was done Brand Nexium- it would cost $297.52 with Insurance PA Approved. Completed the PA for Generic Nexium to see if the Price was lowered. Patient was previously taking the Generic. Called [facility] and and it went through with a $0 Co-Pay   Corinne informed by Phone.

## 2023-04-07 DIAGNOSIS — M47.812 DEGENERATIVE JOINT DISEASE OF CERVICAL AND LUMBAR SPINE: Chronic | ICD-10-CM

## 2023-04-07 DIAGNOSIS — M47.816 DEGENERATIVE JOINT DISEASE OF CERVICAL AND LUMBAR SPINE: Chronic | ICD-10-CM

## 2023-04-07 DIAGNOSIS — G89.29 CHRONIC PAIN OF BOTH KNEES: ICD-10-CM

## 2023-04-07 DIAGNOSIS — M25.562 CHRONIC PAIN OF BOTH KNEES: ICD-10-CM

## 2023-04-07 DIAGNOSIS — M25.561 CHRONIC PAIN OF BOTH KNEES: ICD-10-CM

## 2023-04-07 RX ORDER — TRAMADOL HYDROCHLORIDE 50 MG/1
100 TABLET ORAL EVERY 8 HOURS PRN
Qty: 180 TABLET | Refills: 0 | Status: SHIPPED | OUTPATIENT
Start: 2023-04-07 | End: 2023-05-07

## 2023-05-08 DIAGNOSIS — E87.6 HYPOKALEMIA: ICD-10-CM

## 2023-05-08 DIAGNOSIS — G89.29 CHRONIC PAIN OF BOTH KNEES: ICD-10-CM

## 2023-05-08 DIAGNOSIS — M25.561 CHRONIC PAIN OF BOTH KNEES: ICD-10-CM

## 2023-05-08 DIAGNOSIS — M47.816 DEGENERATIVE JOINT DISEASE OF CERVICAL AND LUMBAR SPINE: Chronic | ICD-10-CM

## 2023-05-08 DIAGNOSIS — M47.812 DEGENERATIVE JOINT DISEASE OF CERVICAL AND LUMBAR SPINE: Chronic | ICD-10-CM

## 2023-05-08 DIAGNOSIS — M25.562 CHRONIC PAIN OF BOTH KNEES: ICD-10-CM

## 2023-05-08 RX ORDER — POTASSIUM CHLORIDE 750 MG/1
CAPSULE, EXTENDED RELEASE ORAL
Qty: 60 CAPSULE | Refills: 5 | Status: SHIPPED | OUTPATIENT
Start: 2023-05-08

## 2023-05-08 RX ORDER — TRAMADOL HYDROCHLORIDE 50 MG/1
100 TABLET ORAL EVERY 8 HOURS PRN
Qty: 180 TABLET | Refills: 0 | Status: SHIPPED | OUTPATIENT
Start: 2023-05-08 | End: 2023-06-07

## 2023-05-08 NOTE — TELEPHONE ENCOUNTER
Date of last visit:  2/20/2023  Date of next visit:  5/22/2023    Requested Prescriptions     Pending Prescriptions Disp Refills    potassium chloride (MICRO-K) 10 MEQ extended release capsule 60 capsule 5     Sig: TAKE 1 CAPSULE BY MOUTH TWICE A DAY    traMADol (ULTRAM) 50 MG tablet 180 tablet 0     Sig: Take 2 tablets by mouth every 8 hours as needed for Pain for up to 30 days.

## 2023-05-22 ENCOUNTER — NURSE ONLY (OUTPATIENT)
Dept: LAB | Age: 68
End: 2023-05-22

## 2023-05-22 ENCOUNTER — OFFICE VISIT (OUTPATIENT)
Dept: FAMILY MEDICINE CLINIC | Age: 68
End: 2023-05-22

## 2023-05-22 VITALS
HEART RATE: 76 BPM | HEIGHT: 66 IN | RESPIRATION RATE: 16 BRPM | BODY MASS INDEX: 25.47 KG/M2 | SYSTOLIC BLOOD PRESSURE: 132 MMHG | WEIGHT: 158.5 LBS | DIASTOLIC BLOOD PRESSURE: 66 MMHG

## 2023-05-22 DIAGNOSIS — I10 ESSENTIAL HYPERTENSION: Primary | ICD-10-CM

## 2023-05-22 DIAGNOSIS — I10 ESSENTIAL HYPERTENSION: ICD-10-CM

## 2023-05-22 DIAGNOSIS — D62 ACUTE BLOOD LOSS ANEMIA: ICD-10-CM

## 2023-05-22 DIAGNOSIS — G89.29 CHRONIC PAIN OF BOTH KNEES: ICD-10-CM

## 2023-05-22 DIAGNOSIS — M25.562 CHRONIC PAIN OF BOTH KNEES: ICD-10-CM

## 2023-05-22 DIAGNOSIS — K21.9 GASTROESOPHAGEAL REFLUX DISEASE WITHOUT ESOPHAGITIS: ICD-10-CM

## 2023-05-22 DIAGNOSIS — M25.561 CHRONIC PAIN OF BOTH KNEES: ICD-10-CM

## 2023-05-22 DIAGNOSIS — M47.812 DEGENERATIVE JOINT DISEASE OF CERVICAL AND LUMBAR SPINE: Chronic | ICD-10-CM

## 2023-05-22 DIAGNOSIS — M47.816 DEGENERATIVE JOINT DISEASE OF CERVICAL AND LUMBAR SPINE: Chronic | ICD-10-CM

## 2023-05-22 DIAGNOSIS — R53.83 FATIGUE, UNSPECIFIED TYPE: ICD-10-CM

## 2023-05-22 LAB
ANION GAP SERPL CALC-SCNC: 14 MEQ/L (ref 8–16)
BASOPHILS ABSOLUTE: 0 THOU/MM3 (ref 0–0.1)
BASOPHILS NFR BLD AUTO: 0.7 %
BUN SERPL-MCNC: 19 MG/DL (ref 7–22)
CALCIUM SERPL-MCNC: 9 MG/DL (ref 8.5–10.5)
CHLORIDE SERPL-SCNC: 96 MEQ/L (ref 98–111)
CO2 SERPL-SCNC: 31 MEQ/L (ref 23–33)
CREAT SERPL-MCNC: 0.8 MG/DL (ref 0.4–1.2)
DEPRECATED RDW RBC AUTO: 39.7 FL (ref 35–45)
EOSINOPHIL NFR BLD AUTO: 1.8 %
EOSINOPHILS ABSOLUTE: 0.1 THOU/MM3 (ref 0–0.4)
ERYTHROCYTE [DISTWIDTH] IN BLOOD BY AUTOMATED COUNT: 15.3 % (ref 11.5–14.5)
GFR SERPL CREATININE-BSD FRML MDRD: > 60 ML/MIN/1.73M2
GLUCOSE SERPL-MCNC: 91 MG/DL (ref 70–108)
HCT VFR BLD AUTO: 30.2 % (ref 37–47)
HGB BLD-MCNC: 8.4 GM/DL (ref 12–16)
HGB, POC: 7.4
HYPOCHROMIA BLD QL SMEAR: PRESENT
IMM GRANULOCYTES # BLD AUTO: 0.01 THOU/MM3 (ref 0–0.07)
IMM GRANULOCYTES NFR BLD AUTO: 0.2 %
LYMPHOCYTES ABSOLUTE: 1.9 THOU/MM3 (ref 1–4.8)
LYMPHOCYTES NFR BLD AUTO: 31.3 %
MCH RBC QN AUTO: 20.1 PG (ref 26–33)
MCHC RBC AUTO-ENTMCNC: 27.8 GM/DL (ref 32.2–35.5)
MCV RBC AUTO: 72.4 FL (ref 81–99)
MONOCYTES ABSOLUTE: 0.6 THOU/MM3 (ref 0.4–1.3)
MONOCYTES NFR BLD AUTO: 10 %
NEUTROPHILS NFR BLD AUTO: 56 %
NRBC BLD AUTO-RTO: 0 /100 WBC
PLATELET # BLD AUTO: 505 THOU/MM3 (ref 130–400)
PMV BLD AUTO: 9.1 FL (ref 9.4–12.4)
POTASSIUM SERPL-SCNC: 2.8 MEQ/L (ref 3.5–5.2)
RBC # BLD AUTO: 4.17 MILL/MM3 (ref 4.2–5.4)
SEGMENTED NEUTROPHILS ABSOLUTE COUNT: 3.4 THOU/MM3 (ref 1.8–7.7)
SODIUM SERPL-SCNC: 141 MEQ/L (ref 135–145)
WBC # BLD AUTO: 6 THOU/MM3 (ref 4.8–10.8)

## 2023-05-22 PROCEDURE — G8427 DOCREV CUR MEDS BY ELIG CLIN: HCPCS | Performed by: FAMILY MEDICINE

## 2023-05-22 PROCEDURE — 85018 HEMOGLOBIN: CPT | Performed by: FAMILY MEDICINE

## 2023-05-22 PROCEDURE — 1090F PRES/ABSN URINE INCON ASSESS: CPT | Performed by: FAMILY MEDICINE

## 2023-05-22 PROCEDURE — 1123F ACP DISCUSS/DSCN MKR DOCD: CPT | Performed by: FAMILY MEDICINE

## 2023-05-22 PROCEDURE — G8400 PT W/DXA NO RESULTS DOC: HCPCS | Performed by: FAMILY MEDICINE

## 2023-05-22 PROCEDURE — 1036F TOBACCO NON-USER: CPT | Performed by: FAMILY MEDICINE

## 2023-05-22 PROCEDURE — 99213 OFFICE O/P EST LOW 20 MIN: CPT | Performed by: FAMILY MEDICINE

## 2023-05-22 PROCEDURE — 3017F COLORECTAL CA SCREEN DOC REV: CPT | Performed by: FAMILY MEDICINE

## 2023-05-22 PROCEDURE — G8417 CALC BMI ABV UP PARAM F/U: HCPCS | Performed by: FAMILY MEDICINE

## 2023-05-22 RX ORDER — HYDROCHLOROTHIAZIDE 25 MG/1
25 TABLET ORAL EVERY OTHER DAY
Qty: 45 TABLET | Refills: 1 | Status: SHIPPED | OUTPATIENT
Start: 2023-05-22

## 2023-05-22 RX ORDER — PREDNISONE 20 MG/1
TABLET ORAL
Qty: 24 TABLET | Refills: 0 | Status: SHIPPED | OUTPATIENT
Start: 2023-05-22

## 2023-05-22 ASSESSMENT — ENCOUNTER SYMPTOMS
WHEEZING: 0
SHORTNESS OF BREATH: 0
SORE THROAT: 0
CHEST TIGHTNESS: 0
COUGH: 0
ABDOMINAL PAIN: 0
NAUSEA: 0
CONSTIPATION: 0
EYE PAIN: 0

## 2023-05-22 NOTE — PROGRESS NOTES
Subjective:      Patient ID: Silvina Hamm is a 78 y.o. female. Knee pain    stable    both knees and  lumbar  area       Neck and  shoulder and  back  area and  some  right   foot pain      Anemia  noted        Chronic  pain and use  of  tramadol and  neurontin and    for the  back  pain and  the  knee  pain     Saturday  2 weeks ago and  pain  with joints  noted          Hypertension  This is a chronic problem. The current episode started more than 1 year ago. The problem has been resolved since onset. The problem is controlled. Pertinent negatives include no chest pain, headaches, malaise/fatigue, neck pain, palpitations, peripheral edema or shortness of breath. The current treatment provides significant improvement. There are no compliance problems. Hyperlipidemia  This is a chronic problem. The current episode started more than 1 year ago. The problem is controlled. Pertinent negatives include no chest pain or shortness of breath. Current antihyperlipidemic treatment includes statins. The current treatment provides significant improvement of lipids. There are no compliance problems. Gastroesophageal Reflux  She reports no abdominal pain, no chest pain, no coughing, no nausea, no sore throat or no wheezing. This is a chronic problem. The current episode started more than 1 year ago. The problem occurs rarely. The problem has been resolved. Pertinent negatives include no fatigue. She has tried a PPI (carafate) for the symptoms. The treatment provided significant relief.    Past Medical History:   Diagnosis Date    Acid reflux     Arthritis     Bleeding tendency (HCC)     Cancer (HCC) 1989    cervical    Gallbladder disease 2010    History of scarlet fever     Hyperlipidemia     Hypertension     Joint pain     Medication intolerance     VERY SENSITIVE TO MEDICATIONS    Medication intolerance     VERY SENSITIVE TO MEDICATIONS     Swelling       Review of Systems   Constitutional:  Negative for appetite

## 2023-05-23 ENCOUNTER — TELEPHONE (OUTPATIENT)
Dept: FAMILY MEDICINE CLINIC | Age: 68
End: 2023-05-23

## 2023-05-23 DIAGNOSIS — E87.6 HYPOKALEMIA: ICD-10-CM

## 2023-05-23 DIAGNOSIS — K21.9 GASTROESOPHAGEAL REFLUX DISEASE WITHOUT ESOPHAGITIS: Primary | ICD-10-CM

## 2023-05-23 DIAGNOSIS — D62 ACUTE BLOOD LOSS ANEMIA: ICD-10-CM

## 2023-05-23 NOTE — TELEPHONE ENCOUNTER
----- Message from Denise Reid MD sent at 5/23/2023  5:54 AM EDT -----  Potassium low and needs to resume in the BUN and creatinine are good. hemoglobin is low at 8.4 so may continue prednisone but stay off the day probable. Take Nexium twice a day and use the Carafate.   Needs iron studies which are on the printer and needs to see GI as was dr Familia Epps but needs to do the scope    Referral for dr Familia Epps and repeat labs and pottassium as do at end of week and take potaaium

## 2023-05-23 NOTE — TELEPHONE ENCOUNTER
Corinne informed by Phone. She stated she already had a Scope done in November 2022. She will start on the Iron again and already takes the Nexium BID. She only takes the Carafate if she has Heart Burn because it makes her want to throw up. Referral with records faxed to [facility], they will contact the patient with an appt day and time. Lab orders faxed to [facility]. She requested to schedule another appt with Dr Jodi Urias to discuss her lab results.  Appt made for Friday 5- at 12:20pm.

## 2023-05-26 ENCOUNTER — OFFICE VISIT (OUTPATIENT)
Dept: FAMILY MEDICINE CLINIC | Age: 68
End: 2023-05-26

## 2023-05-26 VITALS
HEIGHT: 66 IN | HEART RATE: 72 BPM | WEIGHT: 159 LBS | DIASTOLIC BLOOD PRESSURE: 70 MMHG | SYSTOLIC BLOOD PRESSURE: 132 MMHG | RESPIRATION RATE: 14 BRPM | BODY MASS INDEX: 25.55 KG/M2

## 2023-05-26 DIAGNOSIS — D50.0 ANEMIA DUE TO GASTROINTESTINAL BLOOD LOSS: ICD-10-CM

## 2023-05-26 DIAGNOSIS — I10 ESSENTIAL HYPERTENSION: ICD-10-CM

## 2023-05-26 DIAGNOSIS — D64.9 ANEMIA, UNSPECIFIED TYPE: ICD-10-CM

## 2023-05-26 DIAGNOSIS — K21.9 GASTROESOPHAGEAL REFLUX DISEASE WITHOUT ESOPHAGITIS: Primary | ICD-10-CM

## 2023-05-26 ASSESSMENT — ENCOUNTER SYMPTOMS
CHEST TIGHTNESS: 0
EYE PAIN: 0
SORE THROAT: 0
NAUSEA: 0
ABDOMINAL PAIN: 0
CONSTIPATION: 0
SHORTNESS OF BREATH: 0
WHEEZING: 0
COUGH: 0

## 2023-05-26 NOTE — PROGRESS NOTES
Subjective:      Patient ID: Tereso Majano is a 78 y.o. female. Prep  noted and  need  for the  small  bowel  and  frustration  on  gi  noted        Anemia   noted         Off  daypro as  on  predniso         Hypertension  This is a chronic problem. The problem has been resolved since onset. Pertinent negatives include no chest pain, headaches, neck pain, palpitations or shortness of breath. The current treatment provides significant improvement. There are no compliance problems. Past Medical History:   Diagnosis Date    Acid reflux     Arthritis     Bleeding tendency (HCC)     Cancer (HCC) 1989    cervical    Gallbladder disease 2010    History of scarlet fever     Hyperlipidemia     Hypertension     Joint pain     Medication intolerance     VERY SENSITIVE TO MEDICATIONS    Medication intolerance     VERY SENSITIVE TO MEDICATIONS     Swelling       Review of Systems   Constitutional:  Negative for appetite change, fatigue and fever. HENT:  Negative for congestion, ear pain, postnasal drip and sore throat. Eyes:  Negative for pain and visual disturbance. Respiratory:  Negative for cough, chest tightness, shortness of breath and wheezing. Cardiovascular:  Negative for chest pain, palpitations and leg swelling. Gastrointestinal:  Negative for abdominal pain, constipation and nausea. Genitourinary:  Negative for dysuria and frequency. Musculoskeletal:  Negative for arthralgias, joint swelling, neck pain and neck stiffness. Skin:  Negative for rash. Neurological:  Negative for dizziness, weakness, numbness and headaches. Hematological:  Negative for adenopathy. Does not bruise/bleed easily. Psychiatric/Behavioral:  Negative for behavioral problems and sleep disturbance. The patient is not nervous/anxious.     /70 (Site: Left Upper Arm, Position: Sitting, Cuff Size: Medium Adult)   Pulse 72   Resp 14   Ht 5' 6\" (1.676 m)   Wt 159 lb (72.1 kg)   BMI 25.66 kg/m²    Objective:

## 2023-06-02 ENCOUNTER — TELEPHONE (OUTPATIENT)
Dept: FAMILY MEDICINE CLINIC | Age: 68
End: 2023-06-02

## 2023-06-02 ENCOUNTER — NURSE ONLY (OUTPATIENT)
Dept: LAB | Age: 68
End: 2023-06-02

## 2023-06-02 DIAGNOSIS — E87.6 HYPOKALEMIA: Primary | ICD-10-CM

## 2023-06-02 DIAGNOSIS — D50.0 ANEMIA DUE TO GASTROINTESTINAL BLOOD LOSS: ICD-10-CM

## 2023-06-02 DIAGNOSIS — D64.9 ANEMIA, UNSPECIFIED TYPE: ICD-10-CM

## 2023-06-02 DIAGNOSIS — I10 ESSENTIAL HYPERTENSION: ICD-10-CM

## 2023-06-02 DIAGNOSIS — M25.50 ARTHRALGIA, UNSPECIFIED JOINT: ICD-10-CM

## 2023-06-02 DIAGNOSIS — D62 ACUTE BLOOD LOSS ANEMIA: ICD-10-CM

## 2023-06-02 DIAGNOSIS — K21.9 GASTROESOPHAGEAL REFLUX DISEASE WITHOUT ESOPHAGITIS: ICD-10-CM

## 2023-06-02 LAB
ANION GAP SERPL CALC-SCNC: 14 MEQ/L (ref 8–16)
CHLORIDE SERPL-SCNC: 93 MEQ/L (ref 98–111)
CO2 SERPL-SCNC: 33 MEQ/L (ref 23–33)
DEPRECATED RDW RBC AUTO: 40.2 FL (ref 35–45)
ERYTHROCYTE [DISTWIDTH] IN BLOOD BY AUTOMATED COUNT: 18.5 % (ref 11.5–14.5)
FERRITIN SERPL IA-MCNC: 35 NG/ML (ref 10–291)
FOLATE SERPL-MCNC: > 20 NG/ML (ref 4.8–24.2)
HCT VFR BLD AUTO: 31.8 % (ref 37–47)
HGB BLD-MCNC: 8.9 GM/DL (ref 12–16)
IRON SERPL-MCNC: 471 UG/DL (ref 50–170)
MCH RBC QN AUTO: 20.4 PG (ref 26–33)
MCHC RBC AUTO-ENTMCNC: 28 GM/DL (ref 32.2–35.5)
MCV RBC AUTO: 72.9 FL (ref 81–99)
PLATELET # BLD AUTO: 390 THOU/MM3 (ref 130–400)
PMV BLD AUTO: 9.2 FL (ref 9.4–12.4)
POTASSIUM SERPL-SCNC: 2.7 MEQ/L (ref 3.5–5.2)
RBC # BLD AUTO: 4.36 MILL/MM3 (ref 4.2–5.4)
RHEUMATOID FACT SERPL-ACNC: 15 IU/ML (ref 0–13)
SCAN OF BLOOD SMEAR: NORMAL
SODIUM SERPL-SCNC: 140 MEQ/L (ref 135–145)
TIBC SERPL-MCNC: 526 UG/DL (ref 171–450)
VIT B12 SERPL-MCNC: 1876 PG/ML (ref 211–911)
WBC # BLD AUTO: 10.6 THOU/MM3 (ref 4.8–10.8)

## 2023-06-02 NOTE — TELEPHONE ENCOUNTER
[facility] called with a Critical Potassium: 2.7    Called and spoke with patient- she is taking Potassium 10meq once a day. Per verbal order from Dr Aftab Pryor: take Potassium 40meq daily x2-3 days. Then Potassium 20meq daily. Redraw Potassium Level in 1 week    Corinne informed by Phone. Lab order faxed to [facility].

## 2023-06-04 LAB — STFR SERPL-MCNC: 10.6 MG/L (ref 1.9–4.4)

## 2023-06-05 ENCOUNTER — TELEPHONE (OUTPATIENT)
Dept: FAMILY MEDICINE CLINIC | Age: 68
End: 2023-06-05

## 2023-06-05 NOTE — TELEPHONE ENCOUNTER
She stated she has not received any Phone calls at home, cell, or work. She stated she did not want to Schedule until after her assisted on 6-. They only do the Camera on Wed/Thurs and with the prep she would miss 1.5 days of work and she can not do that. (Miss the day before for the Prep, then the following day go at 9am and get the camera and return at 4pm the same day). She stated she has had 2 EGDs and 2 Colonoscopies and both times was told not to return for 10 years.

## 2023-06-05 NOTE — TELEPHONE ENCOUNTER
----- Message from Antwan Serrano MD sent at 6/4/2023 11:25 PM EDT -----  Need to repeat potassium level 6-9-23    Iron studies ok but soluble transferrin assay is high as shows loss of blood so needs to call gi and get small bowel capsul study done    Please call

## 2023-06-09 ENCOUNTER — NURSE ONLY (OUTPATIENT)
Dept: LAB | Age: 68
End: 2023-06-09

## 2023-06-09 DIAGNOSIS — E87.6 HYPOKALEMIA: ICD-10-CM

## 2023-06-09 LAB — POTASSIUM SERPL-SCNC: 3.3 MEQ/L (ref 3.5–5.2)

## 2023-07-12 DIAGNOSIS — M47.812 DEGENERATIVE JOINT DISEASE OF CERVICAL AND LUMBAR SPINE: Chronic | ICD-10-CM

## 2023-07-12 DIAGNOSIS — M25.561 CHRONIC PAIN OF BOTH KNEES: ICD-10-CM

## 2023-07-12 DIAGNOSIS — G89.29 CHRONIC PAIN OF BOTH KNEES: ICD-10-CM

## 2023-07-12 DIAGNOSIS — M25.562 CHRONIC PAIN OF BOTH KNEES: ICD-10-CM

## 2023-07-12 DIAGNOSIS — M47.816 DEGENERATIVE JOINT DISEASE OF CERVICAL AND LUMBAR SPINE: Chronic | ICD-10-CM

## 2023-07-12 RX ORDER — TRAMADOL HYDROCHLORIDE 50 MG/1
100 TABLET ORAL EVERY 8 HOURS PRN
Qty: 180 TABLET | Refills: 0 | Status: SHIPPED | OUTPATIENT
Start: 2023-07-12 | End: 2023-08-11

## 2023-07-12 NOTE — TELEPHONE ENCOUNTER
Date of last visit:  5/26/2023  Date of next visit:  8/21/2023    Requested Prescriptions     Pending Prescriptions Disp Refills    traMADol (ULTRAM) 50 MG tablet 180 tablet 0     Sig: Take 2 tablets by mouth every 8 hours as needed for Pain for up to 30 days.

## 2023-07-12 NOTE — TELEPHONE ENCOUNTER
Pt called to req an refill of the following    traMADol (ULTRAM) 50 MG tablet  TAKE 2 TABLETS BY MOUTH EVERY 8 HOURS AS NEEDED FOR PAIN    Please send to [facility] santiago pt stated she is having to go out of town due to a family emergency and is asking for this to be called in

## 2023-07-27 DIAGNOSIS — J30.1 NON-SEASONAL ALLERGIC RHINITIS DUE TO POLLEN: ICD-10-CM

## 2023-07-27 RX ORDER — IPRATROPIUM BROMIDE 42 UG/1
SPRAY, METERED NASAL
Qty: 1 EACH | Refills: 5 | Status: SHIPPED | OUTPATIENT
Start: 2023-07-27

## 2023-07-27 NOTE — TELEPHONE ENCOUNTER
Date of last visit:  5/26/2023  Date of next visit:  8/21/2023    Requested Prescriptions     Signed Prescriptions Disp Refills    ipratropium (ATROVENT) 0.06 % nasal spray 1 each 5     Sig: USE 1 SPRAY BY NASAL ROUTE DAILY     Authorizing Provider: Fabi Davenport     Ordering User: Brooke Lay

## 2023-08-09 NOTE — PROGRESS NOTES
Managed by primary care provider   Trazodone 50 mg 2 tabs as needed about twice a week   Subjective:      Patient ID: Olinda Lackey is a 60 y.o. female. cervical and  lumbar    some  Pain '         foot pain  Noted   Left  Foot pain      hgb  Low     Noted   And  Iron  Studies       raynauds    Noted     Hypertension  This is a chronic problem. The current episode started more than 1 year ago. The problem has been resolved since onset. The problem is controlled. Pertinent negatives include no chest pain, headaches, neck pain, orthopnea, palpitations, peripheral edema or shortness of breath. The current treatment provides significant improvement. There are no compliance problems. Gastroesophageal Reflux  She reports no abdominal pain, no chest pain, no coughing, no nausea, no sore throat or no wheezing. This is a chronic problem. The current episode started more than 1 year ago. The problem occurs rarely. The problem has been resolved. The symptoms are aggravated by certain foods. Pertinent negatives include no fatigue. She has tried a PPI for the symptoms. Past Medical History:   Diagnosis Date    Acid reflux     Arthritis     Bleeding tendency (HCC)     Cancer ([address] 1989    cervical    Gallbladder disease 2010    History of scarlet fever     Hyperlipidemia     Hypertension     Joint pain     Medication intolerance     VERY SENSITIVE TO MEDICATIONS    Medication intolerance     VERY SENSITIVE TO MEDICATIONS     Swelling      Review of Systems   Constitutional: Negative for appetite change, fatigue and fever. HENT: Negative for congestion, ear pain, postnasal drip and sore throat. Eyes: Negative for pain and visual disturbance. Respiratory: Negative for cough, chest tightness, shortness of breath and wheezing. Cardiovascular: Negative for chest pain, palpitations, orthopnea and leg swelling. Gastrointestinal: Negative for abdominal pain, constipation and nausea. Genitourinary: Negative for dysuria and frequency.    Musculoskeletal: Negative for arthralgias, joint nitroGLYCERIN (NITROLINGUAL) 0.4 MG/SPRAY spray Place 1 spray under the tongue every 5 minutes as needed for Chest pain. 1 Bottle 1    Cholecalciferol (VITAMIN D3) 3000 UNITS TABS Take 5,000 Units by mouth daily.  folic acid (FOLVITE) 1 MG tablet Take 1 mg by mouth daily.  estrogens, conjugated, (PREMARIN) 0.9 MG tablet Take 0.9 mg by mouth daily.  Ascorbic Acid (VITAMIN C) 500 MG tablet Take 1,000 mg by mouth daily. No current facility-administered medications for this visit. No orders of the defined types were placed in this encounter. See in  3mths   Tramadol   For pain       taper  Of pdn     No results found for this visit on 03/10/21.   Shayy Martin MD

## 2023-08-10 DIAGNOSIS — M25.561 CHRONIC PAIN OF BOTH KNEES: ICD-10-CM

## 2023-08-10 DIAGNOSIS — M47.812 DEGENERATIVE JOINT DISEASE OF CERVICAL AND LUMBAR SPINE: Chronic | ICD-10-CM

## 2023-08-10 DIAGNOSIS — M25.562 CHRONIC PAIN OF BOTH KNEES: ICD-10-CM

## 2023-08-10 DIAGNOSIS — M47.816 DEGENERATIVE JOINT DISEASE OF CERVICAL AND LUMBAR SPINE: Chronic | ICD-10-CM

## 2023-08-10 DIAGNOSIS — G89.29 CHRONIC PAIN OF BOTH KNEES: ICD-10-CM

## 2023-08-11 RX ORDER — TRAMADOL HYDROCHLORIDE 50 MG/1
100 TABLET ORAL EVERY 8 HOURS PRN
Qty: 180 TABLET | Refills: 0 | Status: SHIPPED | OUTPATIENT
Start: 2023-08-11 | End: 2023-09-10

## 2023-08-21 ENCOUNTER — OFFICE VISIT (OUTPATIENT)
Dept: FAMILY MEDICINE CLINIC | Age: 68
End: 2023-08-21

## 2023-08-21 VITALS
SYSTOLIC BLOOD PRESSURE: 132 MMHG | DIASTOLIC BLOOD PRESSURE: 74 MMHG | BODY MASS INDEX: 25.11 KG/M2 | HEART RATE: 76 BPM | RESPIRATION RATE: 16 BRPM | HEIGHT: 66 IN | WEIGHT: 156.25 LBS

## 2023-08-21 DIAGNOSIS — I10 ESSENTIAL HYPERTENSION: Primary | ICD-10-CM

## 2023-08-21 DIAGNOSIS — M25.562 CHRONIC PAIN OF BOTH KNEES: ICD-10-CM

## 2023-08-21 DIAGNOSIS — G89.29 CHRONIC PAIN OF BOTH KNEES: ICD-10-CM

## 2023-08-21 DIAGNOSIS — M19.90 ARTHRITIS: ICD-10-CM

## 2023-08-21 DIAGNOSIS — D64.9 ANEMIA, UNSPECIFIED TYPE: ICD-10-CM

## 2023-08-21 DIAGNOSIS — M47.816 DEGENERATIVE JOINT DISEASE OF CERVICAL AND LUMBAR SPINE: Chronic | ICD-10-CM

## 2023-08-21 DIAGNOSIS — M47.812 DEGENERATIVE JOINT DISEASE OF CERVICAL AND LUMBAR SPINE: Chronic | ICD-10-CM

## 2023-08-21 DIAGNOSIS — K21.9 GASTROESOPHAGEAL REFLUX DISEASE WITHOUT ESOPHAGITIS: ICD-10-CM

## 2023-08-21 DIAGNOSIS — M25.561 CHRONIC PAIN OF BOTH KNEES: ICD-10-CM

## 2023-08-21 DIAGNOSIS — R60.9 PERIPHERAL EDEMA: ICD-10-CM

## 2023-08-21 LAB — HGB, POC: 12.4

## 2023-08-21 PROCEDURE — 85018 HEMOGLOBIN: CPT | Performed by: FAMILY MEDICINE

## 2023-08-21 PROCEDURE — 1090F PRES/ABSN URINE INCON ASSESS: CPT | Performed by: FAMILY MEDICINE

## 2023-08-21 PROCEDURE — 1123F ACP DISCUSS/DSCN MKR DOCD: CPT | Performed by: FAMILY MEDICINE

## 2023-08-21 PROCEDURE — 99213 OFFICE O/P EST LOW 20 MIN: CPT | Performed by: FAMILY MEDICINE

## 2023-08-21 PROCEDURE — G8417 CALC BMI ABV UP PARAM F/U: HCPCS | Performed by: FAMILY MEDICINE

## 2023-08-21 PROCEDURE — G8427 DOCREV CUR MEDS BY ELIG CLIN: HCPCS | Performed by: FAMILY MEDICINE

## 2023-08-21 PROCEDURE — 1036F TOBACCO NON-USER: CPT | Performed by: FAMILY MEDICINE

## 2023-08-21 PROCEDURE — 3017F COLORECTAL CA SCREEN DOC REV: CPT | Performed by: FAMILY MEDICINE

## 2023-08-21 PROCEDURE — G8400 PT W/DXA NO RESULTS DOC: HCPCS | Performed by: FAMILY MEDICINE

## 2023-08-21 RX ORDER — HYDROCHLOROTHIAZIDE 25 MG/1
25 TABLET ORAL EVERY OTHER DAY
Qty: 45 TABLET | Refills: 1 | Status: SHIPPED | OUTPATIENT
Start: 2023-08-21

## 2023-08-21 RX ORDER — PREDNISONE 20 MG/1
TABLET ORAL
Qty: 24 TABLET | Refills: 0 | Status: SHIPPED | OUTPATIENT
Start: 2023-08-21

## 2023-08-21 RX ORDER — FUROSEMIDE 20 MG/1
TABLET ORAL
Qty: 90 TABLET | Refills: 1 | Status: SHIPPED | OUTPATIENT
Start: 2023-08-21

## 2023-08-21 ASSESSMENT — ENCOUNTER SYMPTOMS
SORE THROAT: 0
NAUSEA: 0
COUGH: 0
HEARTBURN: 0
CONSTIPATION: 0
ORTHOPNEA: 0
ABDOMINAL PAIN: 0
CHEST TIGHTNESS: 0
EYE PAIN: 0
SHORTNESS OF BREATH: 0
WHEEZING: 0

## 2023-08-21 NOTE — PROGRESS NOTES
Subjective:      Patient ID: Penelope Nelson is a 78 y.o. female. Knee pain  noted and  stable       Mother  with  mi and  with  stent  at  age  81  and  now  at  nursing  home      Anemia  noted   stable       Chronic    edema    noted       Leg   bumps  lower  legs ? Right    hand  pain             Gastroesophageal Reflux  She reports no abdominal pain, no chest pain, no coughing, no heartburn, no nausea, no sore throat or no wheezing. This is a chronic problem. The current episode started more than 1 year ago. The problem occurs rarely. The problem has been resolved. Pertinent negatives include no fatigue. She has tried a PPI for the symptoms. The treatment provided significant relief. Hypertension  This is a chronic problem. The current episode started more than 1 year ago. The problem has been resolved since onset. The problem is controlled. Pertinent negatives include no chest pain, headaches, neck pain, orthopnea, palpitations, peripheral edema or shortness of breath. The current treatment provides significant improvement. There are no compliance problems. Knee Pain   The incident occurred more than 1 week ago. The incident occurred in the street. There was no injury mechanism. The patient is experiencing no pain. Pertinent negatives include no numbness. The treatment provided moderate relief. Past Medical History:   Diagnosis Date    Acid reflux     Arthritis     Bleeding tendency (HCC)     Cancer (HCC) 1989    cervical    Gallbladder disease 2010    History of scarlet fever     Hyperlipidemia     Hypertension     Joint pain     Medication intolerance     VERY SENSITIVE TO MEDICATIONS    Medication intolerance     VERY SENSITIVE TO MEDICATIONS     Swelling       Review of Systems   Constitutional:  Negative for appetite change, fatigue and fever. HENT:  Negative for congestion, ear pain, postnasal drip and sore throat. Eyes:  Negative for pain and visual disturbance.    Respiratory:

## 2023-08-29 NOTE — TELEPHONE ENCOUNTER
Patient called and requested the most recent lab results. Please call her once addressed. Diabetes mellitus

## 2023-09-12 DIAGNOSIS — M47.812 DEGENERATIVE JOINT DISEASE OF CERVICAL AND LUMBAR SPINE: Chronic | ICD-10-CM

## 2023-09-12 DIAGNOSIS — M25.561 CHRONIC PAIN OF BOTH KNEES: ICD-10-CM

## 2023-09-12 DIAGNOSIS — M47.816 DEGENERATIVE JOINT DISEASE OF CERVICAL AND LUMBAR SPINE: Chronic | ICD-10-CM

## 2023-09-12 DIAGNOSIS — M25.562 CHRONIC PAIN OF BOTH KNEES: ICD-10-CM

## 2023-09-12 DIAGNOSIS — G89.29 CHRONIC PAIN OF BOTH KNEES: ICD-10-CM

## 2023-09-12 NOTE — TELEPHONE ENCOUNTER
Date of last visit:  8/21/2023  Date of next visit:  11/21/2023    Requested Prescriptions     Pending Prescriptions Disp Refills    traMADol (ULTRAM) 50 MG tablet 180 tablet 0     Sig: Take 2 tablets by mouth every 8 hours as needed for Pain for up to 30 days.

## 2023-09-13 RX ORDER — TRAMADOL HYDROCHLORIDE 50 MG/1
100 TABLET ORAL EVERY 8 HOURS PRN
Qty: 180 TABLET | Refills: 0 | Status: SHIPPED | OUTPATIENT
Start: 2023-09-13 | End: 2023-10-13

## 2023-09-27 DIAGNOSIS — K21.9 GASTROESOPHAGEAL REFLUX DISEASE WITHOUT ESOPHAGITIS: ICD-10-CM

## 2023-09-28 NOTE — TELEPHONE ENCOUNTER
Date of last visit:  8/21/2023  Date of next visit:  11/21/2023    Requested Prescriptions     Pending Prescriptions Disp Refills    esomeprazole ([facility]) 40 MG delayed release capsule [Pharmacy Med Name: ESOMEPRAZOLE MAG DR 40 MG CAP] 180 capsule 1     Sig: TAKE 1 CAPSULE BY MOUTH TWICE A DAY

## 2023-09-29 RX ORDER — ESOMEPRAZOLE MAGNESIUM 40 MG/1
CAPSULE, DELAYED RELEASE ORAL
Qty: 180 CAPSULE | Refills: 1 | Status: SHIPPED | OUTPATIENT
Start: 2023-09-29

## 2023-10-12 DIAGNOSIS — G89.29 CHRONIC PAIN OF BOTH KNEES: ICD-10-CM

## 2023-10-12 DIAGNOSIS — M25.562 CHRONIC PAIN OF BOTH KNEES: ICD-10-CM

## 2023-10-12 DIAGNOSIS — M25.561 CHRONIC PAIN OF BOTH KNEES: ICD-10-CM

## 2023-10-12 DIAGNOSIS — M47.812 DEGENERATIVE JOINT DISEASE OF CERVICAL AND LUMBAR SPINE: Chronic | ICD-10-CM

## 2023-10-12 DIAGNOSIS — M47.816 DEGENERATIVE JOINT DISEASE OF CERVICAL AND LUMBAR SPINE: Chronic | ICD-10-CM

## 2023-10-13 RX ORDER — TRAMADOL HYDROCHLORIDE 50 MG/1
100 TABLET ORAL EVERY 8 HOURS PRN
Qty: 180 TABLET | Refills: 0 | Status: SHIPPED | OUTPATIENT
Start: 2023-10-12 | End: 2023-11-11

## 2023-11-13 DIAGNOSIS — G89.29 CHRONIC PAIN OF BOTH KNEES: ICD-10-CM

## 2023-11-13 DIAGNOSIS — M25.561 CHRONIC PAIN OF BOTH KNEES: ICD-10-CM

## 2023-11-13 DIAGNOSIS — M47.812 DEGENERATIVE JOINT DISEASE OF CERVICAL AND LUMBAR SPINE: Chronic | ICD-10-CM

## 2023-11-13 DIAGNOSIS — M47.816 DEGENERATIVE JOINT DISEASE OF CERVICAL AND LUMBAR SPINE: Chronic | ICD-10-CM

## 2023-11-13 DIAGNOSIS — M25.562 CHRONIC PAIN OF BOTH KNEES: ICD-10-CM

## 2023-11-14 RX ORDER — TRAMADOL HYDROCHLORIDE 50 MG/1
100 TABLET ORAL EVERY 8 HOURS PRN
Qty: 180 TABLET | Refills: 0 | Status: SHIPPED | OUTPATIENT
Start: 2023-11-14 | End: 2023-12-14

## 2023-11-21 ENCOUNTER — OFFICE VISIT (OUTPATIENT)
Dept: FAMILY MEDICINE CLINIC | Age: 68
End: 2023-11-21

## 2023-11-21 VITALS
WEIGHT: 161.4 LBS | HEIGHT: 66 IN | DIASTOLIC BLOOD PRESSURE: 74 MMHG | BODY MASS INDEX: 25.94 KG/M2 | SYSTOLIC BLOOD PRESSURE: 130 MMHG | HEART RATE: 78 BPM | RESPIRATION RATE: 15 BRPM

## 2023-11-21 DIAGNOSIS — M79.7 FIBROSITIS OF NECK: ICD-10-CM

## 2023-11-21 DIAGNOSIS — K21.9 GASTROESOPHAGEAL REFLUX DISEASE WITHOUT ESOPHAGITIS: ICD-10-CM

## 2023-11-21 DIAGNOSIS — G89.29 CHRONIC PAIN OF BOTH KNEES: ICD-10-CM

## 2023-11-21 DIAGNOSIS — M25.562 CHRONIC PAIN OF BOTH KNEES: ICD-10-CM

## 2023-11-21 DIAGNOSIS — M47.816 DEGENERATIVE JOINT DISEASE OF CERVICAL AND LUMBAR SPINE: Chronic | ICD-10-CM

## 2023-11-21 DIAGNOSIS — M25.561 CHRONIC PAIN OF BOTH KNEES: ICD-10-CM

## 2023-11-21 DIAGNOSIS — M47.812 DEGENERATIVE JOINT DISEASE OF CERVICAL AND LUMBAR SPINE: Chronic | ICD-10-CM

## 2023-11-21 DIAGNOSIS — M19.90 ARTHRITIS: ICD-10-CM

## 2023-11-21 DIAGNOSIS — D50.0 ANEMIA DUE TO GASTROINTESTINAL BLOOD LOSS: ICD-10-CM

## 2023-11-21 DIAGNOSIS — I10 ESSENTIAL HYPERTENSION: Primary | ICD-10-CM

## 2023-11-21 LAB — HGB, POC: 12.8

## 2023-11-21 RX ORDER — PREDNISONE 20 MG/1
TABLET ORAL
Qty: 24 TABLET | Refills: 0 | Status: SHIPPED | OUTPATIENT
Start: 2023-11-21

## 2023-11-21 ASSESSMENT — ENCOUNTER SYMPTOMS
SORE THROAT: 0
NAUSEA: 0
CHEST TIGHTNESS: 0
COUGH: 0
WHEEZING: 0
SHORTNESS OF BREATH: 0
ABDOMINAL PAIN: 0
EYE PAIN: 0
CONSTIPATION: 0

## 2023-11-21 NOTE — PROGRESS NOTES
Subjective:      Patient ID: Mustapha Huizar is a 80 y.o. female. Low  back pain  noted      Terrye Shoulder  stable       Egd  done     and  colonoscopy  10--2022   capsul study  with  gastritis      Shoulder pain  noted   and  neck pain      Venous  insuff  noted      Hypertension  This is a chronic problem. The current episode started more than 1 year ago. The problem has been resolved since onset. The problem is controlled. Pertinent negatives include no anxiety, chest pain, headaches, malaise/fatigue, neck pain, palpitations, peripheral edema or shortness of breath. The current treatment provides significant improvement. There are no compliance problems. Past Medical History:   Diagnosis Date    Acid reflux     Arthritis     Bleeding tendency (HCC)     Cancer (HCC) 1989    cervical    Gallbladder disease 2010    History of scarlet fever     Hyperlipidemia     Hypertension     Joint pain     Medication intolerance     VERY SENSITIVE TO MEDICATIONS    Medication intolerance     VERY SENSITIVE TO MEDICATIONS     Swelling         Review of Systems   Constitutional:  Negative for appetite change, fatigue, fever and malaise/fatigue. HENT:  Negative for congestion, ear pain, postnasal drip and sore throat. Eyes:  Negative for pain and visual disturbance. Respiratory:  Negative for cough, chest tightness, shortness of breath and wheezing. Cardiovascular:  Negative for chest pain, palpitations and leg swelling. Gastrointestinal:  Negative for abdominal pain, constipation and nausea. Genitourinary:  Negative for dysuria and frequency. Musculoskeletal:  Negative for arthralgias, joint swelling, neck pain and neck stiffness. Skin:  Negative for rash. Neurological:  Negative for dizziness, weakness, numbness and headaches. Hematological:  Negative for adenopathy. Does not bruise/bleed easily. Psychiatric/Behavioral:  Negative for behavioral problems and sleep disturbance.  The patient is not

## 2023-11-30 ENCOUNTER — NURSE ONLY (OUTPATIENT)
Dept: FAMILY MEDICINE CLINIC | Age: 68
End: 2023-11-30

## 2023-11-30 DIAGNOSIS — Z23 NEED FOR INFLUENZA VACCINATION: Primary | ICD-10-CM

## 2023-11-30 PROCEDURE — G0008 ADMIN INFLUENZA VIRUS VAC: HCPCS | Performed by: FAMILY MEDICINE

## 2023-11-30 PROCEDURE — 90688 IIV4 VACCINE SPLT 0.5 ML IM: CPT | Performed by: FAMILY MEDICINE

## 2023-11-30 NOTE — PROGRESS NOTES
Immunizations Administered       Name Date Dose Route    Influenza, AFLURIA (age 1 yrs+), FLUZONE, (age 10 mo+), MDV, 0.5mL 11/30/2023 0.5 mL Intramuscular    Site: Deltoid- Left    Lot: [id number]    NDC: 12704-136-92

## 2023-12-09 ENCOUNTER — NURSE ONLY (OUTPATIENT)
Dept: LAB | Age: 68
End: 2023-12-09

## 2023-12-09 DIAGNOSIS — I10 ESSENTIAL HYPERTENSION: ICD-10-CM

## 2023-12-09 DIAGNOSIS — E87.6 HYPOKALEMIA: Primary | ICD-10-CM

## 2023-12-09 LAB
ALBUMIN SERPL BCG-MCNC: 3.5 G/DL (ref 3.5–5.1)
ALP SERPL-CCNC: 62 U/L (ref 38–126)
ALT SERPL W/O P-5'-P-CCNC: 15 U/L (ref 11–66)
ANION GAP SERPL CALC-SCNC: 11 MEQ/L (ref 8–16)
AST SERPL-CCNC: 24 U/L (ref 5–40)
BASOPHILS ABSOLUTE: 0 THOU/MM3 (ref 0–0.1)
BASOPHILS NFR BLD AUTO: 0.2 %
BILIRUB SERPL-MCNC: 0.3 MG/DL (ref 0.3–1.2)
BUN SERPL-MCNC: 19 MG/DL (ref 7–22)
CALCIUM SERPL-MCNC: 8.9 MG/DL (ref 8.5–10.5)
CHLORIDE SERPL-SCNC: 98 MEQ/L (ref 98–111)
CHOLEST SERPL-MCNC: 269 MG/DL (ref 100–199)
CO2 SERPL-SCNC: 34 MEQ/L (ref 23–33)
CREAT SERPL-MCNC: 0.7 MG/DL (ref 0.4–1.2)
DEPRECATED RDW RBC AUTO: 45.1 FL (ref 35–45)
EOSINOPHIL NFR BLD AUTO: 1.1 %
EOSINOPHILS ABSOLUTE: 0.1 THOU/MM3 (ref 0–0.4)
ERYTHROCYTE [DISTWIDTH] IN BLOOD BY AUTOMATED COUNT: 15.7 % (ref 11.5–14.5)
GFR SERPL CREATININE-BSD FRML MDRD: > 60 ML/MIN/1.73M2
GLUCOSE SERPL-MCNC: 74 MG/DL (ref 70–108)
HCT VFR BLD AUTO: 31.2 % (ref 37–47)
HDLC SERPL-MCNC: 112 MG/DL
HGB BLD-MCNC: 9.3 GM/DL (ref 12–16)
IMM GRANULOCYTES # BLD AUTO: 0.05 THOU/MM3 (ref 0–0.07)
IMM GRANULOCYTES NFR BLD AUTO: 0.6 %
LDLC SERPL CALC-MCNC: 134 MG/DL
LYMPHOCYTES ABSOLUTE: 2.5 THOU/MM3 (ref 1–4.8)
LYMPHOCYTES NFR BLD AUTO: 30.2 %
MCH RBC QN AUTO: 23.8 PG (ref 26–33)
MCHC RBC AUTO-ENTMCNC: 29.8 GM/DL (ref 32.2–35.5)
MCV RBC AUTO: 79.8 FL (ref 81–99)
MONOCYTES ABSOLUTE: 0.7 THOU/MM3 (ref 0.4–1.3)
MONOCYTES NFR BLD AUTO: 8.7 %
NEUTROPHILS NFR BLD AUTO: 59.2 %
NRBC BLD AUTO-RTO: 0 /100 WBC
PLATELET # BLD AUTO: 317 THOU/MM3 (ref 130–400)
PMV BLD AUTO: 9.3 FL (ref 9.4–12.4)
POTASSIUM SERPL-SCNC: 2.5 MEQ/L (ref 3.5–5.2)
PROT SERPL-MCNC: 5.8 G/DL (ref 6.1–8)
RBC # BLD AUTO: 3.91 MILL/MM3 (ref 4.2–5.4)
SEGMENTED NEUTROPHILS ABSOLUTE COUNT: 5 THOU/MM3 (ref 1.8–7.7)
SODIUM SERPL-SCNC: 143 MEQ/L (ref 135–145)
TRIGL SERPL-MCNC: 114 MG/DL (ref 0–199)
TSH SERPL DL<=0.005 MIU/L-ACNC: 1.85 UIU/ML (ref 0.4–4.2)
WBC # BLD AUTO: 8.4 THOU/MM3 (ref 4.8–10.8)

## 2023-12-11 ENCOUNTER — TELEPHONE (OUTPATIENT)
Dept: FAMILY MEDICINE CLINIC | Age: 68
End: 2023-12-11

## 2023-12-11 NOTE — TELEPHONE ENCOUNTER
----- Message from Elisa Salinas MD sent at 12/9/2023  3:15 PM EST -----  Did call the patient about low potassium as she is having no symptoms. She is only taken 1 potassium pill. She will increase to 2 pills twice a day for 4 days then 1 pill twice a day repeating lites in 1 week. Hemoglobin also is low and she will start on 1 iron pill a day in about 1 month.   By for a hemoglobin in the office    We did call the patient and speak to her on 12/9/2023    She has potassium pills at home

## 2023-12-12 DIAGNOSIS — G89.29 CHRONIC PAIN OF BOTH KNEES: ICD-10-CM

## 2023-12-12 DIAGNOSIS — M25.562 CHRONIC PAIN OF BOTH KNEES: ICD-10-CM

## 2023-12-12 DIAGNOSIS — M25.561 CHRONIC PAIN OF BOTH KNEES: ICD-10-CM

## 2023-12-12 DIAGNOSIS — M47.816 DEGENERATIVE JOINT DISEASE OF CERVICAL AND LUMBAR SPINE: Chronic | ICD-10-CM

## 2023-12-12 DIAGNOSIS — M47.812 DEGENERATIVE JOINT DISEASE OF CERVICAL AND LUMBAR SPINE: Chronic | ICD-10-CM

## 2023-12-12 NOTE — TELEPHONE ENCOUNTER
Date of last visit:  11/21/2023  Date of next visit:  2/21/2024    Requested Prescriptions     Pending Prescriptions Disp Refills    oxaprozin (DAYPRO) 600 MG tablet 60 tablet 5     Sig: TAKE 1 TABLET BY MOUTH TWICE A DAY    traMADol (ULTRAM) 50 MG tablet 180 tablet 0     Sig: Take 2 tablets by mouth every 8 hours as needed for Pain for up to 30 days.

## 2023-12-13 RX ORDER — TRAMADOL HYDROCHLORIDE 50 MG/1
100 TABLET ORAL EVERY 8 HOURS PRN
Qty: 180 TABLET | Refills: 0 | Status: SHIPPED | OUTPATIENT
Start: 2023-12-13 | End: 2024-01-12

## 2024-01-08 ENCOUNTER — NURSE ONLY (OUTPATIENT)
Dept: FAMILY MEDICINE CLINIC | Age: 69
End: 2024-01-08

## 2024-01-08 DIAGNOSIS — D62 ACUTE BLOOD LOSS ANEMIA: Primary | ICD-10-CM

## 2024-01-08 LAB — HGB, POC: 11.1

## 2024-01-08 PROCEDURE — 85018 HEMOGLOBIN: CPT | Performed by: FAMILY MEDICINE

## 2024-01-08 ASSESSMENT — PATIENT HEALTH QUESTIONNAIRE - PHQ9
SUM OF ALL RESPONSES TO PHQ QUESTIONS 1-9: 0
2. FEELING DOWN, DEPRESSED OR HOPELESS: 0
SUM OF ALL RESPONSES TO PHQ QUESTIONS 1-9: 0
1. LITTLE INTEREST OR PLEASURE IN DOING THINGS: 0
SUM OF ALL RESPONSES TO PHQ9 QUESTIONS 1 & 2: 0

## 2024-01-09 ENCOUNTER — TELEPHONE (OUTPATIENT)
Dept: FAMILY MEDICINE CLINIC | Age: 69
End: 2024-01-09

## 2024-01-09 NOTE — TELEPHONE ENCOUNTER
----- Message from Gilbert Luu MD sent at 1/9/2024  5:58 AM EST -----  Hgb better as was 9.3 but please stay on iron to try to normalize and build up some iron stores     Please call

## 2024-01-11 DIAGNOSIS — M25.562 CHRONIC PAIN OF BOTH KNEES: ICD-10-CM

## 2024-01-11 DIAGNOSIS — M47.812 DEGENERATIVE JOINT DISEASE OF CERVICAL AND LUMBAR SPINE: Chronic | ICD-10-CM

## 2024-01-11 DIAGNOSIS — M25.561 CHRONIC PAIN OF BOTH KNEES: ICD-10-CM

## 2024-01-11 DIAGNOSIS — M47.816 DEGENERATIVE JOINT DISEASE OF CERVICAL AND LUMBAR SPINE: Chronic | ICD-10-CM

## 2024-01-11 DIAGNOSIS — G89.29 CHRONIC PAIN OF BOTH KNEES: ICD-10-CM

## 2024-01-11 NOTE — TELEPHONE ENCOUNTER
Date of last visit:  11/21/2023  Date of next visit:  2/21/2024    Requested Prescriptions     Pending Prescriptions Disp Refills    traMADol (ULTRAM) 50 MG tablet 180 tablet 0     Sig: Take 2 tablets by mouth every 8 hours as needed for Pain for up to 30 days.

## 2024-01-12 RX ORDER — TRAMADOL HYDROCHLORIDE 50 MG/1
100 TABLET ORAL EVERY 8 HOURS PRN
Qty: 180 TABLET | Refills: 0 | Status: SHIPPED | OUTPATIENT
Start: 2024-01-12 | End: 2024-02-11

## 2024-01-16 DIAGNOSIS — M47.816 DEGENERATIVE JOINT DISEASE OF CERVICAL AND LUMBAR SPINE: Chronic | ICD-10-CM

## 2024-01-16 DIAGNOSIS — M47.812 DEGENERATIVE JOINT DISEASE OF CERVICAL AND LUMBAR SPINE: Chronic | ICD-10-CM

## 2024-01-17 RX ORDER — GABAPENTIN 300 MG/1
CAPSULE ORAL
Qty: 60 CAPSULE | Refills: 0 | Status: SHIPPED | OUTPATIENT
Start: 2024-01-17 | End: 2024-07-15

## 2024-01-17 NOTE — TELEPHONE ENCOUNTER
The pharmacy is  requesting a refill of the below medication which has been pended for you:     Requested Prescriptions     Pending Prescriptions Disp Refills    gabapentin (NEURONTIN) 300 MG capsule [Pharmacy Med Name: GABAPENTIN 300 MG CAPSULE] 60 capsule 0     Sig: TAKE 1 CAPSULE BY MOUTH TWICE A DAY       Last Appointment Date: 11/21/2023  Next Appointment Date: 2/21/2024    Allergies   Allergen Reactions    Aldactone [Spironolactone] Other (See Comments)     CAUSES HEADACHES    Amlodipine Besy-Benazepril Hcl Other (See Comments)     CAUSES JOINT PAIN    Amlodipine-Atorvastatin Other (See Comments)     CAUSES INCREASED CRP     Bactrim     Cardizem [Diltiazem Hcl] Other (See Comments)     CAUSES LETHARGY AND LIGHT HEADED    Feldene [Piroxicam]     Motrin [Ibuprofen Micronized]     Naproxen     Potassium-Containing Compounds Other (See Comments)     CAUSES ACID REFLUX    Statins [Statins]      muscle    Vioxx     Zetia [Ezetimibe] Nausea Only and Other (See Comments)     CAUSES REFLUX    Macrobid [Nitrofurantoin] Nausea And Vomiting    Plaquenil [Hydroxychloroquine Sulfate] Rash

## 2024-01-23 ENCOUNTER — NURSE ONLY (OUTPATIENT)
Dept: LAB | Age: 69
End: 2024-01-23

## 2024-02-09 DIAGNOSIS — M47.816 DEGENERATIVE JOINT DISEASE OF CERVICAL AND LUMBAR SPINE: Chronic | ICD-10-CM

## 2024-02-09 DIAGNOSIS — M25.561 CHRONIC PAIN OF BOTH KNEES: ICD-10-CM

## 2024-02-09 DIAGNOSIS — G89.29 CHRONIC PAIN OF BOTH KNEES: ICD-10-CM

## 2024-02-09 DIAGNOSIS — M47.812 DEGENERATIVE JOINT DISEASE OF CERVICAL AND LUMBAR SPINE: Chronic | ICD-10-CM

## 2024-02-09 DIAGNOSIS — M25.562 CHRONIC PAIN OF BOTH KNEES: ICD-10-CM

## 2024-02-11 RX ORDER — TRAMADOL HYDROCHLORIDE 50 MG/1
100 TABLET ORAL EVERY 8 HOURS PRN
Qty: 180 TABLET | Refills: 0 | Status: SHIPPED | OUTPATIENT
Start: 2024-02-11 | End: 2024-03-12

## 2024-02-14 LAB — CYTOLOGY THIN PREP PAP: NORMAL

## 2024-02-19 ENCOUNTER — OFFICE VISIT (OUTPATIENT)
Dept: CARDIOLOGY CLINIC | Age: 69
End: 2024-02-19
Payer: MEDICARE

## 2024-02-19 VITALS
HEART RATE: 78 BPM | DIASTOLIC BLOOD PRESSURE: 68 MMHG | HEIGHT: 66 IN | WEIGHT: 157.8 LBS | SYSTOLIC BLOOD PRESSURE: 148 MMHG | BODY MASS INDEX: 25.36 KG/M2

## 2024-02-19 DIAGNOSIS — I10 PRIMARY HYPERTENSION: ICD-10-CM

## 2024-02-19 DIAGNOSIS — E78.01 FAMILIAL HYPERCHOLESTEROLEMIA: ICD-10-CM

## 2024-02-19 DIAGNOSIS — R06.02 SOB (SHORTNESS OF BREATH): Primary | ICD-10-CM

## 2024-02-19 PROCEDURE — 3077F SYST BP >= 140 MM HG: CPT | Performed by: NUCLEAR MEDICINE

## 2024-02-19 PROCEDURE — 3078F DIAST BP <80 MM HG: CPT | Performed by: NUCLEAR MEDICINE

## 2024-02-19 PROCEDURE — G8482 FLU IMMUNIZE ORDER/ADMIN: HCPCS | Performed by: NUCLEAR MEDICINE

## 2024-02-19 PROCEDURE — 1036F TOBACCO NON-USER: CPT | Performed by: NUCLEAR MEDICINE

## 2024-02-19 PROCEDURE — 93000 ELECTROCARDIOGRAM COMPLETE: CPT | Performed by: NUCLEAR MEDICINE

## 2024-02-19 PROCEDURE — 3017F COLORECTAL CA SCREEN DOC REV: CPT | Performed by: NUCLEAR MEDICINE

## 2024-02-19 PROCEDURE — G8417 CALC BMI ABV UP PARAM F/U: HCPCS | Performed by: NUCLEAR MEDICINE

## 2024-02-19 PROCEDURE — 1123F ACP DISCUSS/DSCN MKR DOCD: CPT | Performed by: NUCLEAR MEDICINE

## 2024-02-19 PROCEDURE — 99213 OFFICE O/P EST LOW 20 MIN: CPT | Performed by: NUCLEAR MEDICINE

## 2024-02-19 PROCEDURE — 1090F PRES/ABSN URINE INCON ASSESS: CPT | Performed by: NUCLEAR MEDICINE

## 2024-02-19 PROCEDURE — G8427 DOCREV CUR MEDS BY ELIG CLIN: HCPCS | Performed by: NUCLEAR MEDICINE

## 2024-02-19 PROCEDURE — G8400 PT W/DXA NO RESULTS DOC: HCPCS | Performed by: NUCLEAR MEDICINE

## 2024-02-19 NOTE — PROGRESS NOTES
don't hesitate to contact me regarding any further issues related to the patient care    Orders Placed:  Orders Placed This Encounter   Procedures    EKG 12 lead     Order Specific Question:   Reason for Exam?     Answer:   Other       Prescribed:  No orders of the defined types were placed in this encounter.         Discussed use, benefit, and side effects of prescribed medications. All patient questions answered. Pt voicedunderstanding. Instructed to continue current medications, diet and exercise. Continue risk factor modification and medical management. Patient agreed with treatment plan. Follow up as directed.    Electronically signedby Prabhakar Tapia MD on 2/19/2024 at 8:29 AM

## 2024-02-21 ENCOUNTER — OFFICE VISIT (OUTPATIENT)
Dept: FAMILY MEDICINE CLINIC | Age: 69
End: 2024-02-21

## 2024-02-21 VITALS
RESPIRATION RATE: 16 BRPM | HEART RATE: 84 BPM | HEIGHT: 66 IN | SYSTOLIC BLOOD PRESSURE: 136 MMHG | BODY MASS INDEX: 25.55 KG/M2 | WEIGHT: 159 LBS | DIASTOLIC BLOOD PRESSURE: 82 MMHG

## 2024-02-21 DIAGNOSIS — R60.9 PERIPHERAL EDEMA: ICD-10-CM

## 2024-02-21 DIAGNOSIS — R10.2 PELVIC PAIN: ICD-10-CM

## 2024-02-21 DIAGNOSIS — M47.816 DEGENERATIVE JOINT DISEASE OF CERVICAL AND LUMBAR SPINE: Chronic | ICD-10-CM

## 2024-02-21 DIAGNOSIS — M47.812 DEGENERATIVE JOINT DISEASE OF CERVICAL AND LUMBAR SPINE: Chronic | ICD-10-CM

## 2024-02-21 DIAGNOSIS — I10 ESSENTIAL HYPERTENSION: ICD-10-CM

## 2024-02-21 DIAGNOSIS — Z00.00 MEDICARE ANNUAL WELLNESS VISIT, SUBSEQUENT: Primary | Chronic | ICD-10-CM

## 2024-02-21 DIAGNOSIS — M70.61 TROCHANTERIC BURSITIS OF RIGHT HIP: ICD-10-CM

## 2024-02-21 RX ORDER — FUROSEMIDE 20 MG/1
TABLET ORAL
Qty: 90 TABLET | Refills: 1 | Status: SHIPPED | OUTPATIENT
Start: 2024-02-21

## 2024-02-21 RX ORDER — GABAPENTIN 300 MG/1
300 CAPSULE ORAL 2 TIMES DAILY
Qty: 60 CAPSULE | Refills: 4 | Status: SHIPPED | OUTPATIENT
Start: 2024-02-21 | End: 2024-07-20

## 2024-02-21 RX ORDER — HYDROCHLOROTHIAZIDE 25 MG/1
25 TABLET ORAL EVERY OTHER DAY
Qty: 45 TABLET | Refills: 1 | Status: SHIPPED | OUTPATIENT
Start: 2024-02-21

## 2024-02-21 RX ORDER — PREDNISONE 20 MG/1
TABLET ORAL
Qty: 10 TABLET | Refills: 0 | Status: SHIPPED | OUTPATIENT
Start: 2024-02-21

## 2024-02-21 SDOH — ECONOMIC STABILITY: INCOME INSECURITY: HOW HARD IS IT FOR YOU TO PAY FOR THE VERY BASICS LIKE FOOD, HOUSING, MEDICAL CARE, AND HEATING?: NOT HARD AT ALL

## 2024-02-21 SDOH — ECONOMIC STABILITY: FOOD INSECURITY: WITHIN THE PAST 12 MONTHS, THE FOOD YOU BOUGHT JUST DIDN'T LAST AND YOU DIDN'T HAVE MONEY TO GET MORE.: NEVER TRUE

## 2024-02-21 SDOH — ECONOMIC STABILITY: FOOD INSECURITY: WITHIN THE PAST 12 MONTHS, YOU WORRIED THAT YOUR FOOD WOULD RUN OUT BEFORE YOU GOT MONEY TO BUY MORE.: NEVER TRUE

## 2024-02-21 ASSESSMENT — ENCOUNTER SYMPTOMS
NAUSEA: 0
BACK PAIN: 1
ABDOMINAL PAIN: 0
SHORTNESS OF BREATH: 0
SORE THROAT: 0
CONSTIPATION: 0
CHEST TIGHTNESS: 0
EYE PAIN: 0
COUGH: 0
WHEEZING: 0

## 2024-02-21 ASSESSMENT — PATIENT HEALTH QUESTIONNAIRE - PHQ9
2. FEELING DOWN, DEPRESSED OR HOPELESS: 0
SUM OF ALL RESPONSES TO PHQ QUESTIONS 1-9: 0
1. LITTLE INTEREST OR PLEASURE IN DOING THINGS: 0
SUM OF ALL RESPONSES TO PHQ9 QUESTIONS 1 & 2: 0
SUM OF ALL RESPONSES TO PHQ QUESTIONS 1-9: 0

## 2024-02-21 ASSESSMENT — LIFESTYLE VARIABLES
HOW MANY STANDARD DRINKS CONTAINING ALCOHOL DO YOU HAVE ON A TYPICAL DAY: 1 OR 2
HOW OFTEN DO YOU HAVE A DRINK CONTAINING ALCOHOL: MONTHLY OR LESS

## 2024-02-21 NOTE — PROGRESS NOTES
Subjective:      Patient ID: Corinne L Rudy is a 68 y.o. female.      Mother   now  in [place]    noted  and  with  issues    on  kertuda   and  with  ashd     Back Pain  This is a new problem. The current episode started more than 1 month ago (2  mths). The problem has been gradually worsening since onset. The pain is present in the lumbar spine. Radiates to: ct  L1   but   pain  low and  pelvis  area on  right. The pain is at a severity of 2/10. The pain is moderate. Pertinent negatives include no abdominal pain, chest pain, dysuria, fever, headaches, numbness or weakness. She has tried ice, muscle relaxant and analgesics for the symptoms. The treatment provided mild relief.   Hypertension  This is a chronic problem. The current episode started more than 1 year ago. The problem has been resolved since onset. The problem is controlled. Pertinent negatives include no chest pain, headaches, neck pain, palpitations or shortness of breath. The current treatment provides significant improvement. There are no compliance problems.      Low  back stable         Knees  stable   tramadol       Gerd  stable        Anemia  noted      Potassium  noted   better              Past Medical History:   Diagnosis Date    Acid reflux     Arthritis     Bleeding tendency (HCC)     Cancer (HCC) 1989    cervical    Gallbladder disease 2010    History of scarlet fever     Hyperlipidemia     Hypertension     Joint pain     Medication intolerance     VERY SENSITIVE TO MEDICATIONS    Medication intolerance     VERY SENSITIVE TO MEDICATIONS     Swelling      Past Surgical History:   Procedure Laterality Date    BACK SURGERY      BACK SURGERY  01/11/2019    Dr Paez @ [facility]       L1  to  L5  with    rods     BLADDER REPAIR  2006    CARDIAC CATHETERIZATION  2009     [facility]  and  dr. mcqueen    CARDIOVASCULAR STRESS TEST  07/30/2009    EF 55% C NO OBVIOUS ISCHEMIA    CARDIOVASCULAR STRESS TEST  09/02/2005    EF 66% C MILD APICAL STRESS INDUCED ISCHEMIA

## 2024-02-21 NOTE — PATIENT INSTRUCTIONS

## 2024-02-23 ENCOUNTER — HOSPITAL ENCOUNTER (OUTPATIENT)
Age: 69
Discharge: HOME | End: 2024-02-23
Payer: MEDICARE

## 2024-02-23 ENCOUNTER — HOSPITAL ENCOUNTER (OUTPATIENT)
Dept: GENERAL RADIOLOGY | Age: 69
Discharge: HOME | End: 2024-02-23
Payer: MEDICARE

## 2024-02-23 DIAGNOSIS — M70.61 TROCHANTERIC BURSITIS OF RIGHT HIP: ICD-10-CM

## 2024-02-23 DIAGNOSIS — R10.2 PELVIC PAIN: ICD-10-CM

## 2024-02-23 PROCEDURE — 72170 X-RAY EXAM OF PELVIS: CPT

## 2024-02-23 PROCEDURE — 73502 X-RAY EXAM HIP UNI 2-3 VIEWS: CPT

## 2024-02-25 DIAGNOSIS — K21.9 GASTROESOPHAGEAL REFLUX DISEASE WITHOUT ESOPHAGITIS: ICD-10-CM

## 2024-02-26 ENCOUNTER — TELEPHONE (OUTPATIENT)
Dept: FAMILY MEDICINE CLINIC | Age: 69
End: 2024-02-26

## 2024-02-26 RX ORDER — ESOMEPRAZOLE MAGNESIUM 40 MG/1
CAPSULE, DELAYED RELEASE ORAL
Qty: 180 CAPSULE | Refills: 1 | Status: SHIPPED | OUTPATIENT
Start: 2024-02-26

## 2024-02-26 NOTE — TELEPHONE ENCOUNTER
The pharmacy is  requesting a refill of the below medication which has been pended for you:     Requested Prescriptions     Pending Prescriptions Disp Refills    esomeprazole (NEXIUM) 40 MG delayed release capsule [Pharmacy Med Name: ESOMEPRAZOLE MAG DR 40 MG CAP] 180 capsule 1     Sig: TAKE 1 CAPSULE BY MOUTH TWICE A DAY       Last Appointment Date: 2/21/2024  Next Appointment Date: 6/4/2024    Allergies   Allergen Reactions    Aldactone [Spironolactone] Other (See Comments)     CAUSES HEADACHES    Amlodipine Besy-Benazepril Hcl Other (See Comments)     CAUSES JOINT PAIN    Amlodipine-Atorvastatin Other (See Comments)     CAUSES INCREASED CRP     Bactrim     Cardizem [Diltiazem Hcl] Other (See Comments)     CAUSES LETHARGY AND LIGHT HEADED    Feldene [Piroxicam]     Motrin [Ibuprofen Micronized]     Naproxen     Potassium-Containing Compounds Other (See Comments)     CAUSES ACID REFLUX    Statins [Statins]      muscle    Vioxx     Zetia [Ezetimibe] Nausea Only and Other (See Comments)     CAUSES REFLUX    Macrobid [Nitrofurantoin] Nausea And Vomiting    Plaquenil [Hydroxychloroquine Sulfate] Rash

## 2024-02-26 NOTE — TELEPHONE ENCOUNTER
Corinne informed by Phone. She did the small capsule study and it showed a small lesion in small bowel.    She stated so far the Hip/Side area is not better.

## 2024-02-26 NOTE — TELEPHONE ENCOUNTER
----- Message from Gilbert Luu MD sent at 2/24/2024  6:42 AM EST -----  Call as hip and pelvis ok so pain hopefully trochanteric bursitis and  better with  the prednisone .  Did we do a small bowel camera study as sounds like at the ileocecal valve

## 2024-03-12 ENCOUNTER — OFFICE VISIT (OUTPATIENT)
Dept: FAMILY MEDICINE CLINIC | Age: 69
End: 2024-03-12

## 2024-03-12 VITALS
RESPIRATION RATE: 16 BRPM | BODY MASS INDEX: 24.91 KG/M2 | WEIGHT: 155 LBS | SYSTOLIC BLOOD PRESSURE: 128 MMHG | DIASTOLIC BLOOD PRESSURE: 64 MMHG | HEIGHT: 66 IN | HEART RATE: 92 BPM

## 2024-03-12 DIAGNOSIS — M47.816 DEGENERATIVE JOINT DISEASE OF CERVICAL AND LUMBAR SPINE: Chronic | ICD-10-CM

## 2024-03-12 DIAGNOSIS — M70.61 TROCHANTERIC BURSITIS OF RIGHT HIP: Primary | ICD-10-CM

## 2024-03-12 DIAGNOSIS — M25.561 CHRONIC PAIN OF BOTH KNEES: ICD-10-CM

## 2024-03-12 DIAGNOSIS — M25.562 CHRONIC PAIN OF BOTH KNEES: ICD-10-CM

## 2024-03-12 DIAGNOSIS — I10 ESSENTIAL HYPERTENSION: ICD-10-CM

## 2024-03-12 DIAGNOSIS — K21.9 GASTROESOPHAGEAL REFLUX DISEASE WITHOUT ESOPHAGITIS: ICD-10-CM

## 2024-03-12 DIAGNOSIS — M47.812 DEGENERATIVE JOINT DISEASE OF CERVICAL AND LUMBAR SPINE: Chronic | ICD-10-CM

## 2024-03-12 DIAGNOSIS — G89.29 CHRONIC PAIN OF BOTH KNEES: ICD-10-CM

## 2024-03-12 PROCEDURE — 1036F TOBACCO NON-USER: CPT | Performed by: FAMILY MEDICINE

## 2024-03-12 PROCEDURE — 1123F ACP DISCUSS/DSCN MKR DOCD: CPT | Performed by: FAMILY MEDICINE

## 2024-03-12 PROCEDURE — 3017F COLORECTAL CA SCREEN DOC REV: CPT | Performed by: FAMILY MEDICINE

## 2024-03-12 PROCEDURE — G8427 DOCREV CUR MEDS BY ELIG CLIN: HCPCS | Performed by: FAMILY MEDICINE

## 2024-03-12 PROCEDURE — 99213 OFFICE O/P EST LOW 20 MIN: CPT | Performed by: FAMILY MEDICINE

## 2024-03-12 PROCEDURE — G8400 PT W/DXA NO RESULTS DOC: HCPCS | Performed by: FAMILY MEDICINE

## 2024-03-12 RX ORDER — TRAMADOL HYDROCHLORIDE 50 MG/1
100 TABLET ORAL EVERY 8 HOURS PRN
Qty: 180 TABLET | Refills: 0 | Status: SHIPPED | OUTPATIENT
Start: 2024-03-12 | End: 2024-04-11

## 2024-03-12 ASSESSMENT — ENCOUNTER SYMPTOMS
NAUSEA: 0
CHEST TIGHTNESS: 0
CONSTIPATION: 0
WHEEZING: 0
SHORTNESS OF BREATH: 0
EYE PAIN: 0
COUGH: 0
ABDOMINAL PAIN: 0
SORE THROAT: 0
BACK PAIN: 1

## 2024-03-12 NOTE — PROGRESS NOTES
Subjective:      Patient ID: Corinne L Rudy is a 68 y.o. female.      Gerd    stable      Chronic  pain  back and  the  knees              Back Pain  This is a recurrent problem. The current episode started more than 1 month ago. The pain is present in the lumbar spine and sacro-iliac. The quality of the pain is described as aching. Radiates to: right  hip pain. The pain is at a severity of 2/10. The pain is moderate. The pain is Worse during the day. The symptoms are aggravated by bending and position. Pertinent negatives include no abdominal pain, chest pain, dysuria, fever, headaches, numbness or weakness. The treatment provided mild relief.   Hypertension  This is a chronic problem. The current episode started more than 1 year ago. The problem has been resolved since onset. The problem is controlled. Pertinent negatives include no chest pain, headaches, neck pain, palpitations or shortness of breath. The current treatment provides significant improvement. There are no compliance problems.    Hip Pain   The incident occurred more than 1 week ago. The incident occurred at home. There was no injury mechanism. The pain is present in the right hip. The quality of the pain is described as burning and aching. The pain is at a severity of 4/10. The pain is moderate. Pertinent negatives include no numbness. She reports no foreign bodies present. Treatments tried: pdn  noted. The treatment provided mild relief.     Past Medical History:   Diagnosis Date    Acid reflux     Arthritis     Bleeding tendency (HCC)     Cancer (HCC) 1989    cervical    Gallbladder disease 2010    History of scarlet fever     Hyperlipidemia     Hypertension     Joint pain     Medication intolerance     VERY SENSITIVE TO MEDICATIONS    Medication intolerance     VERY SENSITIVE TO MEDICATIONS     Swelling       Review of Systems   Constitutional:  Negative for appetite change, fatigue and fever.   HENT:  Negative for congestion, ear pain, postnasal

## 2024-03-14 DIAGNOSIS — I10 ESSENTIAL HYPERTENSION: ICD-10-CM

## 2024-03-14 NOTE — TELEPHONE ENCOUNTER
Date of last visit:  3/12/2024  Date of next visit:  6/4/2024    Requested Prescriptions     Pending Prescriptions Disp Refills    hydroCHLOROthiazide (HYDRODIURIL) 25 MG tablet [Pharmacy Med Name: HYDROCHLOROTHIAZIDE 25 MG TAB] 45 tablet 1     Sig: TAKE 1 TABLET BY MOUTH EVERY OTHER DAY

## 2024-03-15 RX ORDER — HYDROCHLOROTHIAZIDE 25 MG/1
25 TABLET ORAL EVERY OTHER DAY
Qty: 45 TABLET | Refills: 1 | Status: SHIPPED | OUTPATIENT
Start: 2024-03-15

## 2024-04-02 ENCOUNTER — TELEPHONE (OUTPATIENT)
Dept: FAMILY MEDICINE CLINIC | Age: 69
End: 2024-04-02

## 2024-04-02 NOTE — TELEPHONE ENCOUNTER
CORINNE RUDY (Key: [id number])  PA Case ID #: [id number]  Need Help? Call us at [phone]  Status: Sent to Plan today  Drug: NEXIUM 40 MG delayed-release capsules  Form: [employer] Electronic PA Form (2017 NCPDP)

## 2024-04-10 DIAGNOSIS — M47.816 DEGENERATIVE JOINT DISEASE OF CERVICAL AND LUMBAR SPINE: Chronic | ICD-10-CM

## 2024-04-10 DIAGNOSIS — G89.29 CHRONIC PAIN OF BOTH KNEES: ICD-10-CM

## 2024-04-10 DIAGNOSIS — M47.812 DEGENERATIVE JOINT DISEASE OF CERVICAL AND LUMBAR SPINE: Chronic | ICD-10-CM

## 2024-04-10 DIAGNOSIS — M25.562 CHRONIC PAIN OF BOTH KNEES: ICD-10-CM

## 2024-04-10 DIAGNOSIS — M25.561 CHRONIC PAIN OF BOTH KNEES: ICD-10-CM

## 2024-04-10 RX ORDER — TRAMADOL HYDROCHLORIDE 50 MG/1
100 TABLET ORAL EVERY 8 HOURS PRN
Qty: 180 TABLET | Refills: 0 | Status: SHIPPED | OUTPATIENT
Start: 2024-04-10 | End: 2024-05-10

## 2024-04-10 NOTE — TELEPHONE ENCOUNTER
Date of last visit:  3/12/2024  Date of next visit:  6/4/2024    Requested Prescriptions     Pending Prescriptions Disp Refills    traMADol (ULTRAM) 50 MG tablet 180 tablet 0     Sig: Take 2 tablets by mouth every 8 hours as needed for Pain for up to 30 days.

## 2024-04-23 ENCOUNTER — TELEPHONE (OUTPATIENT)
Dept: CARDIOLOGY CLINIC | Age: 69
End: 2024-04-23

## 2024-04-23 ENCOUNTER — TELEPHONE (OUTPATIENT)
Dept: FAMILY MEDICINE CLINIC | Age: 69
End: 2024-04-23

## 2024-04-23 NOTE — TELEPHONE ENCOUNTER
[facility] faxed for clearance     Pre op Risk Assessment    Procedure Left midfoot screw removal  Physician Dr. Guerrero  Date of surgery/procedure 5/21/24    Last OV 2/19/24  Last Stress not seen in [facility]  Last Echo 3/14/22  Last Cath 2009  Last Stent not seen in epic  Is patient on blood thinners none  Hold Meds/how many days ?    Dr. Hathaway is pt cleared?

## 2024-04-23 NOTE — TELEPHONE ENCOUNTER
We received pre-op clearance form to be completed via fax from [facility].    Pt will be having Left Mid Foot Screw Removal on 05/21/24 at [facility] by Dr Guerrero.     Blank form scanned into Epic and placed on cart.

## 2024-04-30 ENCOUNTER — HOSPITAL ENCOUNTER (OUTPATIENT)
Age: 69
Discharge: HOME | End: 2024-04-30
Payer: MEDICARE

## 2024-04-30 LAB
EKG ATRIAL RATE: 64 BPM
EKG P AXIS: 43 DEGREES
EKG P-R INTERVAL: 158 MS
EKG Q-T INTERVAL: 460 MS
EKG QRS DURATION: 98 MS
EKG QTC CALCULATION (BAZETT): 474 MS
EKG R AXIS: 64 DEGREES
EKG T AXIS: 51 DEGREES
EKG VENTRICULAR RATE: 64 BPM

## 2024-04-30 PROCEDURE — 93010 ELECTROCARDIOGRAM REPORT: CPT | Performed by: INTERNAL MEDICINE

## 2024-04-30 PROCEDURE — 93005 ELECTROCARDIOGRAM TRACING: CPT | Performed by: ORTHOPAEDIC SURGERY

## 2024-05-08 ENCOUNTER — OFFICE VISIT (OUTPATIENT)
Dept: FAMILY MEDICINE CLINIC | Age: 69
End: 2024-05-08

## 2024-05-08 VITALS
DIASTOLIC BLOOD PRESSURE: 78 MMHG | SYSTOLIC BLOOD PRESSURE: 122 MMHG | WEIGHT: 159.13 LBS | HEIGHT: 66 IN | HEART RATE: 76 BPM | RESPIRATION RATE: 18 BRPM | BODY MASS INDEX: 25.57 KG/M2

## 2024-05-08 DIAGNOSIS — M79.672 LEFT FOOT PAIN: Primary | ICD-10-CM

## 2024-05-08 DIAGNOSIS — M47.812 DEGENERATIVE JOINT DISEASE OF CERVICAL AND LUMBAR SPINE: Chronic | ICD-10-CM

## 2024-05-08 DIAGNOSIS — G89.29 CHRONIC MIDLINE LOW BACK PAIN WITHOUT SCIATICA: ICD-10-CM

## 2024-05-08 DIAGNOSIS — M47.816 DEGENERATIVE JOINT DISEASE OF CERVICAL AND LUMBAR SPINE: Chronic | ICD-10-CM

## 2024-05-08 DIAGNOSIS — I10 ESSENTIAL HYPERTENSION: ICD-10-CM

## 2024-05-08 DIAGNOSIS — M54.50 CHRONIC MIDLINE LOW BACK PAIN WITHOUT SCIATICA: ICD-10-CM

## 2024-05-08 DIAGNOSIS — M25.562 CHRONIC PAIN OF BOTH KNEES: ICD-10-CM

## 2024-05-08 DIAGNOSIS — G89.29 CHRONIC PAIN OF BOTH KNEES: ICD-10-CM

## 2024-05-08 DIAGNOSIS — K21.9 GASTROESOPHAGEAL REFLUX DISEASE WITHOUT ESOPHAGITIS: ICD-10-CM

## 2024-05-08 DIAGNOSIS — M25.561 CHRONIC PAIN OF BOTH KNEES: ICD-10-CM

## 2024-05-08 PROCEDURE — 1036F TOBACCO NON-USER: CPT | Performed by: FAMILY MEDICINE

## 2024-05-08 PROCEDURE — 99214 OFFICE O/P EST MOD 30 MIN: CPT | Performed by: FAMILY MEDICINE

## 2024-05-08 PROCEDURE — G8427 DOCREV CUR MEDS BY ELIG CLIN: HCPCS | Performed by: FAMILY MEDICINE

## 2024-05-08 PROCEDURE — 3017F COLORECTAL CA SCREEN DOC REV: CPT | Performed by: FAMILY MEDICINE

## 2024-05-08 PROCEDURE — 1123F ACP DISCUSS/DSCN MKR DOCD: CPT | Performed by: FAMILY MEDICINE

## 2024-05-08 PROCEDURE — G8417 CALC BMI ABV UP PARAM F/U: HCPCS | Performed by: FAMILY MEDICINE

## 2024-05-08 RX ORDER — TRAMADOL HYDROCHLORIDE 50 MG/1
100 TABLET ORAL EVERY 8 HOURS PRN
Qty: 180 TABLET | Refills: 0 | Status: SHIPPED | OUTPATIENT
Start: 2024-05-08 | End: 2024-06-07

## 2024-05-08 ASSESSMENT — ENCOUNTER SYMPTOMS
COUGH: 0
CONSTIPATION: 0
SHORTNESS OF BREATH: 0
WHEEZING: 0
ABDOMINAL PAIN: 0
EYE PAIN: 0
SORE THROAT: 0
ORTHOPNEA: 0
CHEST TIGHTNESS: 0
NAUSEA: 0

## 2024-05-08 NOTE — PROGRESS NOTES
Subjective   Patient ID: Corinne L Rudy is a 68 y.o. female.      Left    foot  with screw  noted  with pain  as  ankle    with  screw  now penetrated  to plantar  aspect  of the   foot . Persist  left  foot pain  due to operative screw      Dr yu  to  do   on  5-21      Ashd  stable      saw  dr mcqueen  and   did    EKG  and  stable   and  cleared       Hgb  at  9.9 and    small   bowel  erosion erosion  noted        Leg  edema        Lasix  20   qod and  opposite  days  hctz and  lasix   10  mg      Hypertension  This is a chronic problem. The current episode started more than 1 year ago. The problem has been resolved since onset. The problem is controlled. Pertinent negatives include no chest pain, headaches, neck pain, orthopnea, palpitations, peripheral edema or shortness of breath. The current treatment provides significant improvement. There are no compliance problems.  There is no history of angina.     Past Medical History:   Diagnosis Date    Acid reflux     Arthritis     Bleeding tendency (HCC)     Cancer (HCC) 1989    cervical    Gallbladder disease 2010    History of scarlet fever     Hyperlipidemia     Hypertension     Joint pain     Medication intolerance     VERY SENSITIVE TO MEDICATIONS    Medication intolerance     VERY SENSITIVE TO MEDICATIONS     Swelling      Past Surgical History:   Procedure Laterality Date    BACK SURGERY      BACK SURGERY  01/11/2019    Dr Paez @ [facility]       L1  to  L5  with    rods     BLADDER REPAIR  2006    CARDIAC CATHETERIZATION  2009     [facility]  and  dr. mcqueen    CARDIOVASCULAR STRESS TEST  07/30/2009    EF 55% C NO OBVIOUS ISCHEMIA    CARDIOVASCULAR STRESS TEST  09/02/2005    EF 66% C MILD APICAL STRESS INDUCED ISCHEMIA    CERVICAL FUSION  11/22/2013    Dr. Paez @ [facility], 4-5, 5-6, 6-7     COLONOSCOPY  10/2022      normal  and good   10 year s    ESOPHAGOGASTRODUODENOSCOPY  10/2022    mild gastritis    dr nogueira    HYSTERECTOMY (CERVIX STATUS UNKNOWN)  1989

## 2024-05-19 NOTE — TELEPHONE ENCOUNTER
20mg propofol administered IV by Dr. VILLALOBOS    Needs to increase  potassium to one bid for 5 days and then back to her normal dose and potassium in 10 days

## 2024-05-27 DIAGNOSIS — E87.6 HYPOKALEMIA: ICD-10-CM

## 2024-05-28 RX ORDER — POTASSIUM CHLORIDE 750 MG/1
CAPSULE, EXTENDED RELEASE ORAL
Qty: 180 CAPSULE | Refills: 1 | Status: SHIPPED | OUTPATIENT
Start: 2024-05-28

## 2024-05-28 NOTE — TELEPHONE ENCOUNTER
Date of last visit:  5/8/2024  Date of next visit:  8/12/2024    Requested Prescriptions     Pending Prescriptions Disp Refills    potassium chloride (MICRO-K) 10 MEQ extended release capsule [Pharmacy Med Name: POTASSIUM CL ER 10 MEQ CAPSULE] 180 capsule 1     Sig: TAKE 1 CAPSULE BY MOUTH TWICE A DAY

## 2024-05-29 ENCOUNTER — TELEPHONE (OUTPATIENT)
Dept: FAMILY MEDICINE CLINIC | Age: 69
End: 2024-05-29

## 2024-05-29 RX ORDER — PREDNISONE 20 MG/1
TABLET ORAL
Qty: 24 TABLET | Refills: 0 | Status: SHIPPED | OUTPATIENT
Start: 2024-05-29

## 2024-05-29 NOTE — TELEPHONE ENCOUNTER
Date of last visit:  5/8/2024  Date of next visit:  8/12/2024    Requested Prescriptions     Signed Prescriptions Disp Refills    predniSONE (DELTASONE) 20 MG tablet 24 tablet 0     Sig: Take 1 tablet, PO TID x 3 days then BID x 5 days then daily x 5 days     Authorizing Provider: ERLINDA LUU     Ordering User: FABBY KINCAID       Patient came in and requested PDN for Generalized Body Aches. She just had surgery last week and started back on her Anti-Inflammatories however she is still in a lot of Pain. [facility] stated it was ok for Dr Luu to Prescribe it.

## 2024-06-11 DIAGNOSIS — M25.561 CHRONIC PAIN OF BOTH KNEES: ICD-10-CM

## 2024-06-11 DIAGNOSIS — M47.812 DEGENERATIVE JOINT DISEASE OF CERVICAL AND LUMBAR SPINE: Chronic | ICD-10-CM

## 2024-06-11 DIAGNOSIS — G89.29 CHRONIC PAIN OF BOTH KNEES: ICD-10-CM

## 2024-06-11 DIAGNOSIS — M54.50 CHRONIC MIDLINE LOW BACK PAIN WITHOUT SCIATICA: ICD-10-CM

## 2024-06-11 DIAGNOSIS — M47.816 DEGENERATIVE JOINT DISEASE OF CERVICAL AND LUMBAR SPINE: Chronic | ICD-10-CM

## 2024-06-11 DIAGNOSIS — G89.29 CHRONIC MIDLINE LOW BACK PAIN WITHOUT SCIATICA: ICD-10-CM

## 2024-06-11 DIAGNOSIS — M25.562 CHRONIC PAIN OF BOTH KNEES: ICD-10-CM

## 2024-06-11 NOTE — TELEPHONE ENCOUNTER
Date of last visit:  5/8/2024  Date of next visit:  6/13/2024    Requested Prescriptions     Pending Prescriptions Disp Refills    traMADol (ULTRAM) 50 MG tablet 180 tablet 0     Sig: Take 2 tablets by mouth every 8 hours as needed for Pain for up to 30 days.

## 2024-06-12 RX ORDER — TRAMADOL HYDROCHLORIDE 50 MG/1
100 TABLET ORAL EVERY 8 HOURS PRN
Qty: 180 TABLET | Refills: 0 | Status: SHIPPED | OUTPATIENT
Start: 2024-06-12 | End: 2024-07-12

## 2024-06-13 ENCOUNTER — OFFICE VISIT (OUTPATIENT)
Dept: FAMILY MEDICINE CLINIC | Age: 69
End: 2024-06-13

## 2024-06-13 VITALS
RESPIRATION RATE: 16 BRPM | WEIGHT: 158 LBS | DIASTOLIC BLOOD PRESSURE: 72 MMHG | SYSTOLIC BLOOD PRESSURE: 136 MMHG | HEART RATE: 70 BPM | BODY MASS INDEX: 25.39 KG/M2 | HEIGHT: 66 IN

## 2024-06-13 DIAGNOSIS — G89.29 CHRONIC PAIN OF BOTH KNEES: ICD-10-CM

## 2024-06-13 DIAGNOSIS — M47.816 DEGENERATIVE JOINT DISEASE OF CERVICAL AND LUMBAR SPINE: Chronic | ICD-10-CM

## 2024-06-13 DIAGNOSIS — M25.561 CHRONIC PAIN OF BOTH KNEES: ICD-10-CM

## 2024-06-13 DIAGNOSIS — K21.9 GASTROESOPHAGEAL REFLUX DISEASE WITHOUT ESOPHAGITIS: ICD-10-CM

## 2024-06-13 DIAGNOSIS — G89.29 CHRONIC RIGHT-SIDED LOW BACK PAIN WITHOUT SCIATICA: ICD-10-CM

## 2024-06-13 DIAGNOSIS — M54.50 CHRONIC RIGHT-SIDED LOW BACK PAIN WITHOUT SCIATICA: ICD-10-CM

## 2024-06-13 DIAGNOSIS — M47.812 DEGENERATIVE JOINT DISEASE OF CERVICAL AND LUMBAR SPINE: Primary | Chronic | ICD-10-CM

## 2024-06-13 DIAGNOSIS — M25.562 CHRONIC PAIN OF BOTH KNEES: ICD-10-CM

## 2024-06-13 DIAGNOSIS — M47.816 DEGENERATIVE JOINT DISEASE OF CERVICAL AND LUMBAR SPINE: Primary | Chronic | ICD-10-CM

## 2024-06-13 DIAGNOSIS — I10 ESSENTIAL HYPERTENSION: ICD-10-CM

## 2024-06-13 DIAGNOSIS — M47.812 DEGENERATIVE JOINT DISEASE OF CERVICAL AND LUMBAR SPINE: Chronic | ICD-10-CM

## 2024-06-13 DIAGNOSIS — R60.0 PERIPHERAL EDEMA: ICD-10-CM

## 2024-06-13 RX ORDER — METAXALONE 800 MG/1
800 TABLET ORAL 3 TIMES DAILY
Qty: 90 TABLET | Refills: 1 | Status: SHIPPED | OUTPATIENT
Start: 2024-06-13 | End: 2024-06-14

## 2024-06-13 RX ORDER — FUROSEMIDE 20 MG/1
TABLET ORAL
Qty: 90 TABLET | Refills: 1 | Status: SHIPPED | OUTPATIENT
Start: 2024-06-13

## 2024-06-13 RX ORDER — PREDNISONE 20 MG/1
TABLET ORAL
Qty: 24 TABLET | Refills: 0 | Status: SHIPPED | OUTPATIENT
Start: 2024-06-13

## 2024-06-13 ASSESSMENT — ENCOUNTER SYMPTOMS
SHORTNESS OF BREATH: 0
CONSTIPATION: 0
CHEST TIGHTNESS: 0
EYE PAIN: 0
COUGH: 0
NAUSEA: 0
ABDOMINAL PAIN: 0
SORE THROAT: 0
WHEEZING: 0

## 2024-06-13 NOTE — TELEPHONE ENCOUNTER
Date of last visit:  6/13/2024  Date of next visit:  8/12/2024    Requested Prescriptions     Pending Prescriptions Disp Refills    tiZANidine (ZANAFLEX) 4 MG tablet [Pharmacy Med Name: TIZANIDINE HCL 4 MG TABLET]  0

## 2024-06-13 NOTE — PROGRESS NOTES
Ascorbic Acid (VITAMIN C) 500 MG tablet Take 2 tablets by mouth daily       No current facility-administered medications for this visit.    No orders of the defined types were placed in this encounter.            See  in   2 mths     Gilbert Luu MD

## 2024-06-14 RX ORDER — TIZANIDINE 4 MG/1
4 TABLET ORAL EVERY 8 HOURS PRN
Qty: 100 TABLET | Refills: 1 | Status: SHIPPED | OUTPATIENT
Start: 2024-06-14

## 2024-06-14 NOTE — TELEPHONE ENCOUNTER
[facility] called wanted a verification on the Tizanidine due to it being a new med and that she is on Premarin tabs.  I pulled  from patient room and checked and he said it is ok for patient to take the Tizanidine.  [facility] informed.

## 2024-06-28 DIAGNOSIS — M25.562 CHRONIC PAIN OF BOTH KNEES: ICD-10-CM

## 2024-06-28 DIAGNOSIS — G89.29 CHRONIC PAIN OF BOTH KNEES: ICD-10-CM

## 2024-06-28 DIAGNOSIS — G89.29 CHRONIC RIGHT-SIDED LOW BACK PAIN WITHOUT SCIATICA: ICD-10-CM

## 2024-06-28 DIAGNOSIS — M47.816 DEGENERATIVE JOINT DISEASE OF CERVICAL AND LUMBAR SPINE: Chronic | ICD-10-CM

## 2024-06-28 DIAGNOSIS — M54.50 CHRONIC RIGHT-SIDED LOW BACK PAIN WITHOUT SCIATICA: ICD-10-CM

## 2024-06-28 DIAGNOSIS — M25.561 CHRONIC PAIN OF BOTH KNEES: ICD-10-CM

## 2024-06-28 DIAGNOSIS — M47.812 DEGENERATIVE JOINT DISEASE OF CERVICAL AND LUMBAR SPINE: Chronic | ICD-10-CM

## 2024-06-28 NOTE — TELEPHONE ENCOUNTER
The pharmacy is  requesting a refill of the below medication which has been pended for you:     Requested Prescriptions     Pending Prescriptions Disp Refills    tiZANidine (ZANAFLEX) 4 MG tablet [Pharmacy Med Name: TIZANIDINE HCL 4 MG TABLET] 270 tablet 1     Sig: TAKE 1 TABLET BY MOUTH EVERY 8 HOURS AS NEEDED (MUSCLESPASM)       Last Appointment Date: 6/13/2024  Next Appointment Date: 6/28/2024    Allergies   Allergen Reactions    Aldactone [Spironolactone] Other (See Comments)     CAUSES HEADACHES    Amlodipine Besy-Benazepril Hcl Other (See Comments)     CAUSES JOINT PAIN    Amlodipine-Atorvastatin Other (See Comments)     CAUSES INCREASED CRP     Bactrim     Cardizem [Diltiazem Hcl] Other (See Comments)     CAUSES LETHARGY AND LIGHT HEADED    Feldene [Piroxicam]     Motrin [Ibuprofen Micronized]     Naproxen     Potassium-Containing Compounds Other (See Comments)     CAUSES ACID REFLUX    Statins [Statins]      muscle    Vioxx     Zetia [Ezetimibe] Nausea Only and Other (See Comments)     CAUSES REFLUX    Macrobid [Nitrofurantoin] Nausea And Vomiting    Plaquenil [Hydroxychloroquine Sulfate] Rash

## 2024-06-29 RX ORDER — TIZANIDINE 4 MG/1
4 TABLET ORAL EVERY 8 HOURS PRN
Qty: 270 TABLET | Refills: 0 | Status: SHIPPED | OUTPATIENT
Start: 2024-06-29

## 2024-07-09 DIAGNOSIS — M25.562 CHRONIC PAIN OF BOTH KNEES: ICD-10-CM

## 2024-07-09 DIAGNOSIS — G89.29 CHRONIC PAIN OF BOTH KNEES: ICD-10-CM

## 2024-07-09 DIAGNOSIS — G89.29 CHRONIC MIDLINE LOW BACK PAIN WITHOUT SCIATICA: ICD-10-CM

## 2024-07-09 DIAGNOSIS — M47.816 DEGENERATIVE JOINT DISEASE OF CERVICAL AND LUMBAR SPINE: Chronic | ICD-10-CM

## 2024-07-09 DIAGNOSIS — M54.50 CHRONIC MIDLINE LOW BACK PAIN WITHOUT SCIATICA: ICD-10-CM

## 2024-07-09 DIAGNOSIS — M25.561 CHRONIC PAIN OF BOTH KNEES: ICD-10-CM

## 2024-07-09 DIAGNOSIS — M47.812 DEGENERATIVE JOINT DISEASE OF CERVICAL AND LUMBAR SPINE: Chronic | ICD-10-CM

## 2024-07-09 RX ORDER — TRAMADOL HYDROCHLORIDE 50 MG/1
100 TABLET ORAL EVERY 8 HOURS PRN
Qty: 180 TABLET | Refills: 0 | Status: SHIPPED | OUTPATIENT
Start: 2024-07-09 | End: 2024-08-08

## 2024-07-09 NOTE — TELEPHONE ENCOUNTER
Date of last visit:  6/13/2024  Date of next visit:  8/12/2024    Requested Prescriptions     Pending Prescriptions Disp Refills    traMADol (ULTRAM) 50 MG tablet 180 tablet 0     Sig: Take 2 tablets by mouth every 8 hours as needed for Pain for up to 30 days.

## 2024-07-21 DIAGNOSIS — J30.1 NON-SEASONAL ALLERGIC RHINITIS DUE TO POLLEN: ICD-10-CM

## 2024-07-22 RX ORDER — IPRATROPIUM BROMIDE 42 UG/1
SPRAY, METERED NASAL
Qty: 3 EACH | Refills: 1 | Status: SHIPPED | OUTPATIENT
Start: 2024-07-22

## 2024-07-22 NOTE — TELEPHONE ENCOUNTER
Date of last visit:  6/13/2024  Date of next visit:  8/12/2024    Requested Prescriptions     Pending Prescriptions Disp Refills    ipratropium (ATROVENT) 0.06 % nasal spray [Pharmacy Med Name: IPRATROPIUM 0.06% SPRAY]  1     Sig: USE 2 SPRAY BY NASAL ROUTE TWICE DAILY.

## 2024-08-12 ENCOUNTER — OFFICE VISIT (OUTPATIENT)
Dept: FAMILY MEDICINE CLINIC | Age: 69
End: 2024-08-12

## 2024-08-12 VITALS
RESPIRATION RATE: 16 BRPM | BODY MASS INDEX: 25.88 KG/M2 | HEIGHT: 66 IN | WEIGHT: 161 LBS | HEART RATE: 72 BPM | DIASTOLIC BLOOD PRESSURE: 84 MMHG | SYSTOLIC BLOOD PRESSURE: 126 MMHG

## 2024-08-12 DIAGNOSIS — M47.812 DEGENERATIVE JOINT DISEASE OF CERVICAL AND LUMBAR SPINE: Chronic | ICD-10-CM

## 2024-08-12 DIAGNOSIS — I10 ESSENTIAL HYPERTENSION: Primary | ICD-10-CM

## 2024-08-12 DIAGNOSIS — M25.562 CHRONIC PAIN OF BOTH KNEES: ICD-10-CM

## 2024-08-12 DIAGNOSIS — G89.29 CHRONIC RIGHT-SIDED LOW BACK PAIN WITHOUT SCIATICA: ICD-10-CM

## 2024-08-12 DIAGNOSIS — G89.29 CHRONIC PAIN OF BOTH KNEES: ICD-10-CM

## 2024-08-12 DIAGNOSIS — M54.50 CHRONIC RIGHT-SIDED LOW BACK PAIN WITHOUT SCIATICA: ICD-10-CM

## 2024-08-12 DIAGNOSIS — K21.9 GASTROESOPHAGEAL REFLUX DISEASE WITHOUT ESOPHAGITIS: ICD-10-CM

## 2024-08-12 DIAGNOSIS — M47.816 DEGENERATIVE JOINT DISEASE OF CERVICAL AND LUMBAR SPINE: Chronic | ICD-10-CM

## 2024-08-12 DIAGNOSIS — G89.29 CHRONIC MIDLINE LOW BACK PAIN WITHOUT SCIATICA: ICD-10-CM

## 2024-08-12 DIAGNOSIS — R60.0 PERIPHERAL EDEMA: ICD-10-CM

## 2024-08-12 DIAGNOSIS — M25.561 CHRONIC PAIN OF BOTH KNEES: ICD-10-CM

## 2024-08-12 DIAGNOSIS — M54.50 CHRONIC MIDLINE LOW BACK PAIN WITHOUT SCIATICA: ICD-10-CM

## 2024-08-12 DIAGNOSIS — E87.6 HYPOKALEMIA: ICD-10-CM

## 2024-08-12 PROCEDURE — 1123F ACP DISCUSS/DSCN MKR DOCD: CPT | Performed by: FAMILY MEDICINE

## 2024-08-12 PROCEDURE — G8417 CALC BMI ABV UP PARAM F/U: HCPCS | Performed by: FAMILY MEDICINE

## 2024-08-12 PROCEDURE — G8400 PT W/DXA NO RESULTS DOC: HCPCS | Performed by: FAMILY MEDICINE

## 2024-08-12 PROCEDURE — 99213 OFFICE O/P EST LOW 20 MIN: CPT | Performed by: FAMILY MEDICINE

## 2024-08-12 PROCEDURE — G8427 DOCREV CUR MEDS BY ELIG CLIN: HCPCS | Performed by: FAMILY MEDICINE

## 2024-08-12 PROCEDURE — 3017F COLORECTAL CA SCREEN DOC REV: CPT | Performed by: FAMILY MEDICINE

## 2024-08-12 PROCEDURE — 1090F PRES/ABSN URINE INCON ASSESS: CPT | Performed by: FAMILY MEDICINE

## 2024-08-12 PROCEDURE — 1036F TOBACCO NON-USER: CPT | Performed by: FAMILY MEDICINE

## 2024-08-12 RX ORDER — METAXALONE 800 MG/1
800 TABLET ORAL 3 TIMES DAILY
Qty: 90 TABLET | Refills: 1 | Status: SHIPPED | OUTPATIENT
Start: 2024-08-12 | End: 2024-10-11

## 2024-08-12 RX ORDER — TRAMADOL HYDROCHLORIDE 50 MG/1
100 TABLET ORAL EVERY 8 HOURS PRN
Qty: 180 TABLET | Refills: 0 | Status: SHIPPED | OUTPATIENT
Start: 2024-08-12 | End: 2024-09-11

## 2024-08-12 ASSESSMENT — ENCOUNTER SYMPTOMS
CONSTIPATION: 0
ABDOMINAL PAIN: 0
CHEST TIGHTNESS: 0
SHORTNESS OF BREATH: 0
ORTHOPNEA: 0
WHEEZING: 0
SORE THROAT: 0
DIARRHEA: 0
NAUSEA: 0
EYE PAIN: 0
COUGH: 0

## 2024-08-12 NOTE — PROGRESS NOTES
time.      Cranial Nerves: No cranial nerve deficit.      Deep Tendon Reflexes: Reflexes are normal and symmetric.            Assessment     ICD-10-CM    1. Essential hypertension  I10 CBC with Auto Differential     Basic Metabolic Panel      2. Degenerative joint disease of cervical and lumbar spine  M47.812 traMADol (ULTRAM) 50 MG tablet    M47.816 metaxalone (SKELAXIN) 800 MG tablet      3. Chronic pain of both knees  M25.561 traMADol (ULTRAM) 50 MG tablet    M25.562 metaxalone (SKELAXIN) 800 MG tablet    G89.29       4. Chronic midline low back pain without sciatica  M54.50 traMADol (ULTRAM) 50 MG tablet    G89.29       5. Chronic right-sided low back pain without sciatica  M54.50 metaxalone (SKELAXIN) 800 MG tablet    G89.29       6. Peripheral edema  R60.0       7. Gastroesophageal reflux disease without esophagitis  K21.9       8. Hypokalemia  E87.6              Plan   Current Outpatient Medications   Medication Sig Dispense Refill    traMADol (ULTRAM) 50 MG tablet Take 2 tablets by mouth every 8 hours as needed for Pain for up to 30 days. 180 tablet 0    metaxalone (SKELAXIN) 800 MG tablet Take 1 tablet by mouth 3 times daily 90 tablet 1    ipratropium (ATROVENT) 0.06 % nasal spray USE 2 SPRAY BY NASAL ROUTE TWICE DAILY. 3 each 1    furosemide (LASIX) 20 MG tablet TAKE 1 TABLET BY MOUTH DAILY AS NEEDED (LEG EDEMA) 90 tablet 1    potassium chloride (MICRO-K) 10 MEQ extended release capsule TAKE 1 CAPSULE BY MOUTH TWICE A  capsule 1    hydroCHLOROthiazide (HYDRODIURIL) 25 MG tablet TAKE 1 TABLET BY MOUTH EVERY OTHER DAY 45 tablet 1    esomeprazole (NEXIUM) 40 MG delayed release capsule TAKE 1 CAPSULE BY MOUTH TWICE A  capsule 1    gabapentin (NEURONTIN) 300 MG capsule Take 1 capsule by mouth 2 times daily for 150 days. 60 capsule 4    oxaprozin (DAYPRO) 600 MG tablet TAKE 1 TABLET BY MOUTH TWICE A DAY 60 tablet 5    diclofenac sodium (VOLTAREN) 1 % GEL APPLY 2 G TOPICALLY 4 TIMES DAILY 100 g 5

## 2024-08-13 DIAGNOSIS — M54.50 CHRONIC RIGHT-SIDED LOW BACK PAIN WITHOUT SCIATICA: ICD-10-CM

## 2024-08-13 DIAGNOSIS — M47.816 DEGENERATIVE JOINT DISEASE OF CERVICAL AND LUMBAR SPINE: Chronic | ICD-10-CM

## 2024-08-13 DIAGNOSIS — M25.562 CHRONIC PAIN OF BOTH KNEES: ICD-10-CM

## 2024-08-13 DIAGNOSIS — G89.29 CHRONIC RIGHT-SIDED LOW BACK PAIN WITHOUT SCIATICA: ICD-10-CM

## 2024-08-13 DIAGNOSIS — M25.561 CHRONIC PAIN OF BOTH KNEES: ICD-10-CM

## 2024-08-13 DIAGNOSIS — G89.29 CHRONIC PAIN OF BOTH KNEES: ICD-10-CM

## 2024-08-13 DIAGNOSIS — M47.812 DEGENERATIVE JOINT DISEASE OF CERVICAL AND LUMBAR SPINE: Chronic | ICD-10-CM

## 2024-08-17 DIAGNOSIS — M47.816 DEGENERATIVE JOINT DISEASE OF CERVICAL AND LUMBAR SPINE: Chronic | ICD-10-CM

## 2024-08-17 DIAGNOSIS — M47.812 DEGENERATIVE JOINT DISEASE OF CERVICAL AND LUMBAR SPINE: Chronic | ICD-10-CM

## 2024-08-19 ENCOUNTER — LAB (OUTPATIENT)
Dept: LAB | Age: 69
End: 2024-08-19

## 2024-08-19 DIAGNOSIS — I10 ESSENTIAL HYPERTENSION: ICD-10-CM

## 2024-08-19 LAB
ANION GAP SERPL CALC-SCNC: 12 MEQ/L (ref 8–16)
BASOPHILS ABSOLUTE: 0.1 THOU/MM3 (ref 0–0.1)
BASOPHILS NFR BLD AUTO: 1.2 %
BUN SERPL-MCNC: 17 MG/DL (ref 7–22)
CALCIUM SERPL-MCNC: 8.3 MG/DL (ref 8.5–10.5)
CHLORIDE SERPL-SCNC: 98 MEQ/L (ref 98–111)
CO2 SERPL-SCNC: 28 MEQ/L (ref 23–33)
CREAT SERPL-MCNC: 0.7 MG/DL (ref 0.4–1.2)
DEPRECATED RDW RBC AUTO: 50.4 FL (ref 35–45)
EOSINOPHIL NFR BLD AUTO: 4.9 %
EOSINOPHILS ABSOLUTE: 0.2 THOU/MM3 (ref 0–0.4)
ERYTHROCYTE [DISTWIDTH] IN BLOOD BY AUTOMATED COUNT: 16.9 % (ref 11.5–14.5)
GFR SERPL CREATININE-BSD FRML MDRD: > 90 ML/MIN/1.73M2
GLUCOSE SERPL-MCNC: 84 MG/DL (ref 70–108)
HCT VFR BLD AUTO: 32.6 % (ref 37–47)
HGB BLD-MCNC: 9.7 GM/DL (ref 12–16)
IMM GRANULOCYTES # BLD AUTO: 0.02 THOU/MM3 (ref 0–0.07)
IMM GRANULOCYTES NFR BLD AUTO: 0.4 %
LYMPHOCYTES ABSOLUTE: 1.2 THOU/MM3 (ref 1–4.8)
LYMPHOCYTES NFR BLD AUTO: 25.4 %
MCH RBC QN AUTO: 24.4 PG (ref 26–33)
MCHC RBC AUTO-ENTMCNC: 29.8 GM/DL (ref 32.2–35.5)
MCV RBC AUTO: 82.1 FL (ref 81–99)
MONOCYTES ABSOLUTE: 0.7 THOU/MM3 (ref 0.4–1.3)
MONOCYTES NFR BLD AUTO: 13.9 %
NEUTROPHILS ABSOLUTE: 2.7 THOU/MM3 (ref 1.8–7.7)
NEUTROPHILS NFR BLD AUTO: 54.2 %
NRBC BLD AUTO-RTO: 0 /100 WBC
PLATELET # BLD AUTO: 299 THOU/MM3 (ref 130–400)
PMV BLD AUTO: 9.7 FL (ref 9.4–12.4)
POTASSIUM SERPL-SCNC: 3.2 MEQ/L (ref 3.5–5.2)
RBC # BLD AUTO: 3.97 MILL/MM3 (ref 4.2–5.4)
SODIUM SERPL-SCNC: 138 MEQ/L (ref 135–145)
WBC # BLD AUTO: 4.9 THOU/MM3 (ref 4.8–10.8)

## 2024-08-19 NOTE — TELEPHONE ENCOUNTER
Date of last visit:  8/12/2024  Date of next visit:  11/12/2024    Requested Prescriptions     Pending Prescriptions Disp Refills    oxaprozin (DAYPRO) 600 MG tablet [Pharmacy Med Name: OXAPROZIN 600 MG TABLET] 60 tablet 5     Sig: TAKE 1 TABLET BY MOUTH TWICE A DAY

## 2024-08-19 NOTE — TELEPHONE ENCOUNTER
Date of last visit:  8/12/2024  Date of next visit:  11/12/2024    Requested Prescriptions     Signed Prescriptions Disp Refills    oxaprozin (DAYPRO) 600 MG tablet 60 tablet 5     Sig: TAKE 1 TABLET BY MOUTH TWICE A DAY     Authorizing Provider: ERLINDA BUCIO     Ordering User: FABBY KINCAID     Patient is completely out.

## 2024-08-20 ENCOUNTER — TELEPHONE (OUTPATIENT)
Dept: FAMILY MEDICINE CLINIC | Age: 69
End: 2024-08-20

## 2024-08-20 NOTE — TELEPHONE ENCOUNTER
----- Message from Dr. Gilbert Luu MD sent at 8/20/2024  6:45 AM EDT -----  Stay  on potassium and stay  on iron pills  and will repeat potassium cook labs in next  several months

## 2024-08-22 ENCOUNTER — TELEPHONE (OUTPATIENT)
Dept: FAMILY MEDICINE CLINIC | Age: 69
End: 2024-08-22

## 2024-08-22 DIAGNOSIS — E87.6 HYPOKALEMIA: Primary | ICD-10-CM

## 2024-08-22 NOTE — TELEPHONE ENCOUNTER
Corinne called and requested Pre-Op for upcoming Surgery.    Surgery: Right Shoulder Scope/Mini Open SAD, DCE, SAB and Rotator Cuff Repair  Surgeon: Dr Bennett  Date: Friday 8-  Where is Surgery taking place: [facility]  Is the Surgeons office ordering Pre-Op Testing: NA    Per verbal order from Dr Luu- does not need a Pre-Op appt since she was just seen on 8-. She did have Labs drawn. She is taking Potassium everyday and Iron 3x a week. Dr Luu would like her to increase her Potassium, Stop her Daypro, and Increase Iron to twice a day.    Electrolyte Panel needs drawn on 8-.

## 2024-08-23 NOTE — TELEPHONE ENCOUNTER
Corinne informed by Phone on 8-- verbalized understanding. Lab order faxed to [facility] per request.

## 2024-08-25 DIAGNOSIS — K21.9 GASTROESOPHAGEAL REFLUX DISEASE WITHOUT ESOPHAGITIS: ICD-10-CM

## 2024-08-26 RX ORDER — ESOMEPRAZOLE MAGNESIUM 40 MG/1
CAPSULE, DELAYED RELEASE ORAL
Qty: 180 CAPSULE | Refills: 1 | Status: SHIPPED | OUTPATIENT
Start: 2024-08-26

## 2024-08-26 NOTE — TELEPHONE ENCOUNTER
Date of last visit:  8/12/2024  Date of next visit:  11/12/2024    Requested Prescriptions     Pending Prescriptions Disp Refills    esomeprazole (NEXIUM) 40 MG delayed release capsule [Pharmacy Med Name: ESOMEPRAZOLE MAG DR 40 MG CAP] 180 capsule 1     Sig: TAKE 1 CAPSULE BY MOUTH TWICE A DAY

## 2024-08-28 ENCOUNTER — LAB (OUTPATIENT)
Dept: LAB | Age: 69
End: 2024-08-28

## 2024-08-28 DIAGNOSIS — E87.6 HYPOKALEMIA: ICD-10-CM

## 2024-08-28 LAB
ANION GAP SERPL CALC-SCNC: 12 MEQ/L (ref 8–16)
BUN SERPL-MCNC: 15 MG/DL (ref 7–22)
CALCIUM SERPL-MCNC: 8.8 MG/DL (ref 8.5–10.5)
CHLORIDE SERPL-SCNC: 102 MEQ/L (ref 98–111)
CO2 SERPL-SCNC: 28 MEQ/L (ref 23–33)
CREAT SERPL-MCNC: 0.6 MG/DL (ref 0.4–1.2)
GFR SERPL CREATININE-BSD FRML MDRD: > 90 ML/MIN/1.73M2
GLUCOSE SERPL-MCNC: 93 MG/DL (ref 70–108)
POTASSIUM SERPL-SCNC: 3 MEQ/L (ref 3.5–5.2)
SODIUM SERPL-SCNC: 142 MEQ/L (ref 135–145)

## 2024-08-29 ENCOUNTER — TELEPHONE (OUTPATIENT)
Dept: FAMILY MEDICINE CLINIC | Age: 69
End: 2024-08-29

## 2024-08-29 DIAGNOSIS — E87.6 HYPOKALEMIA: Primary | ICD-10-CM

## 2024-08-29 DIAGNOSIS — D64.9 ANEMIA, UNSPECIFIED TYPE: ICD-10-CM

## 2024-08-29 NOTE — TELEPHONE ENCOUNTER
----- Message from Dr. Gilbert Luu MD sent at 8/29/2024  6:18 AM EDT -----  Taking the potassium ?    Hold lasix 3 days before procedure a take potassium and repeat potassium day of procedure

## 2024-08-29 NOTE — TELEPHONE ENCOUNTER
Corinne informed by Phone. She is taking Potassium 2-3 per day along with her Iron. Her surgery is tomorrow 8-. She stated she was not planning on taking Diuretics. She has been sweating a lot, not eating as much with the Heat Index outside.     She stated she will take Potassium and Nexium tomorrow and she is also allowed to drink Gatorade til she leaves she her Surgery.

## 2024-08-30 NOTE — TELEPHONE ENCOUNTER
As long as surgery ok no issues and post surgery try to decrease lasix same ans lab in   3 to 4 weeks as stay on iron

## 2024-09-03 ENCOUNTER — TELEPHONE (OUTPATIENT)
Dept: FAMILY MEDICINE CLINIC | Age: 69
End: 2024-09-03

## 2024-09-03 NOTE — TELEPHONE ENCOUNTER
----- Message from Dr. Gilbert Luu MD sent at 9/3/2024  5:18 AM EDT -----  Check if did have surgery and get lab end of september

## 2024-09-09 DIAGNOSIS — G89.29 CHRONIC PAIN OF BOTH KNEES: ICD-10-CM

## 2024-09-09 DIAGNOSIS — M54.50 CHRONIC MIDLINE LOW BACK PAIN WITHOUT SCIATICA: ICD-10-CM

## 2024-09-09 DIAGNOSIS — M25.562 CHRONIC PAIN OF BOTH KNEES: ICD-10-CM

## 2024-09-09 DIAGNOSIS — G89.29 CHRONIC MIDLINE LOW BACK PAIN WITHOUT SCIATICA: ICD-10-CM

## 2024-09-09 DIAGNOSIS — M25.561 CHRONIC PAIN OF BOTH KNEES: ICD-10-CM

## 2024-09-09 DIAGNOSIS — M47.812 DEGENERATIVE JOINT DISEASE OF CERVICAL AND LUMBAR SPINE: Chronic | ICD-10-CM

## 2024-09-09 DIAGNOSIS — M47.816 DEGENERATIVE JOINT DISEASE OF CERVICAL AND LUMBAR SPINE: Chronic | ICD-10-CM

## 2024-09-10 RX ORDER — TRAMADOL HYDROCHLORIDE 50 MG/1
100 TABLET ORAL EVERY 8 HOURS PRN
Qty: 180 TABLET | Refills: 0 | Status: SHIPPED | OUTPATIENT
Start: 2024-09-10 | End: 2024-10-10

## 2024-09-30 ENCOUNTER — LAB (OUTPATIENT)
Dept: LAB | Age: 69
End: 2024-09-30

## 2024-09-30 DIAGNOSIS — D64.9 ANEMIA, UNSPECIFIED TYPE: ICD-10-CM

## 2024-09-30 DIAGNOSIS — E87.6 HYPOKALEMIA: ICD-10-CM

## 2024-09-30 LAB
ANION GAP SERPL CALC-SCNC: 11 MEQ/L (ref 8–16)
BUN SERPL-MCNC: 20 MG/DL (ref 7–22)
CALCIUM SERPL-MCNC: 8.3 MG/DL (ref 8.5–10.5)
CHLORIDE SERPL-SCNC: 97 MEQ/L (ref 98–111)
CO2 SERPL-SCNC: 29 MEQ/L (ref 23–33)
CREAT SERPL-MCNC: 0.7 MG/DL (ref 0.4–1.2)
GFR SERPL CREATININE-BSD FRML MDRD: > 90 ML/MIN/1.73M2
GLUCOSE SERPL-MCNC: 96 MG/DL (ref 70–108)
HCT VFR BLD AUTO: 31.9 % (ref 37–47)
HGB BLD-MCNC: 9.3 GM/DL (ref 12–16)
POTASSIUM SERPL-SCNC: 3.6 MEQ/L (ref 3.5–5.2)
SODIUM SERPL-SCNC: 137 MEQ/L (ref 135–145)

## 2024-10-01 ENCOUNTER — TELEPHONE (OUTPATIENT)
Dept: FAMILY MEDICINE CLINIC | Age: 69
End: 2024-10-01

## 2024-10-01 DIAGNOSIS — D64.9 ANEMIA, UNSPECIFIED TYPE: Primary | ICD-10-CM

## 2024-10-01 NOTE — TELEPHONE ENCOUNTER
Corinne informed by Phone. Lab order faxed to [facility] per request.     Patient stated she is taking Vit C 1,000mg daily, Vit D 5,000 units Daily and Iron Slow FE 1 tablet Mon, Wed, and Fri.

## 2024-10-01 NOTE — TELEPHONE ENCOUNTER
----- Message from Dr. Gilbert Luu MD sent at 10/1/2024  5:55 AM EDT -----  Potassium now normal at 3.6 so better but hemoglobin still low at 9.3.  Not sure if taking the iron pill with vitamin C and if she is probably should repeat her ferritin level soluble transfer no say to see if getting iron as ferritin would indicate this and soluble transferrin assay would suggest loss of blood    Lab on printer     Please call

## 2024-10-08 ENCOUNTER — LAB (OUTPATIENT)
Dept: LAB | Age: 69
End: 2024-10-08

## 2024-10-08 DIAGNOSIS — M47.816 DEGENERATIVE JOINT DISEASE OF CERVICAL AND LUMBAR SPINE: Chronic | ICD-10-CM

## 2024-10-08 DIAGNOSIS — I10 ESSENTIAL HYPERTENSION: ICD-10-CM

## 2024-10-08 DIAGNOSIS — M54.50 CHRONIC MIDLINE LOW BACK PAIN WITHOUT SCIATICA: ICD-10-CM

## 2024-10-08 DIAGNOSIS — M47.812 DEGENERATIVE JOINT DISEASE OF CERVICAL AND LUMBAR SPINE: Chronic | ICD-10-CM

## 2024-10-08 DIAGNOSIS — M25.561 CHRONIC PAIN OF BOTH KNEES: ICD-10-CM

## 2024-10-08 DIAGNOSIS — D64.9 ANEMIA, UNSPECIFIED TYPE: ICD-10-CM

## 2024-10-08 DIAGNOSIS — G89.29 CHRONIC MIDLINE LOW BACK PAIN WITHOUT SCIATICA: ICD-10-CM

## 2024-10-08 DIAGNOSIS — M25.562 CHRONIC PAIN OF BOTH KNEES: ICD-10-CM

## 2024-10-08 DIAGNOSIS — G89.29 CHRONIC PAIN OF BOTH KNEES: ICD-10-CM

## 2024-10-08 LAB
ANION GAP SERPL CALC-SCNC: 11 MEQ/L (ref 8–16)
CHLORIDE SERPL-SCNC: 97 MEQ/L (ref 98–111)
CO2 SERPL-SCNC: 32 MEQ/L (ref 23–33)
FERRITIN SERPL IA-MCNC: 37 NG/ML (ref 10–291)
POTASSIUM SERPL-SCNC: 2.8 MEQ/L (ref 3.5–5.2)
SODIUM SERPL-SCNC: 140 MEQ/L (ref 135–145)

## 2024-10-08 NOTE — TELEPHONE ENCOUNTER
Date of last visit:  8/12/2024  Date of next visit:  11/12/2024    Requested Prescriptions     Pending Prescriptions Disp Refills    traMADol (ULTRAM) 50 MG tablet 180 tablet 0     Sig: Take 2 tablets by mouth every 8 hours as needed for Pain for up to 30 days.

## 2024-10-09 ENCOUNTER — TELEPHONE (OUTPATIENT)
Dept: FAMILY MEDICINE CLINIC | Age: 69
End: 2024-10-09

## 2024-10-09 RX ORDER — TRAMADOL HYDROCHLORIDE 50 MG/1
100 TABLET ORAL EVERY 8 HOURS PRN
Qty: 180 TABLET | Refills: 0 | Status: SHIPPED | OUTPATIENT
Start: 2024-10-09 | End: 2024-11-08

## 2024-10-09 NOTE — TELEPHONE ENCOUNTER
----- Message from Dr. Gilbert Luu MD sent at 10/9/2024  6:07 AM EDT -----  Ferritin ok and waiting on the soluble transfer assay    Potassium is low     Assuming not taking potassium as much     Aldactone caused headache but ? Try again and stop  hxtz  or try inspra 25 mg     Please call

## 2024-10-09 NOTE — TELEPHONE ENCOUNTER
Corinne informed by Phone. She is taking Potassium once a day. She stated she will stay on HCTZ and cut Lasix to half a tab every other day. She will try to not take it with the HCTZ. She would like to do this until she has her follow up appt with Dr Luu on 11- and then discuss it with him then.

## 2024-10-11 LAB — STFR SERPL-MCNC: 5.6 MG/L (ref 1.9–4.4)

## 2024-10-11 NOTE — TELEPHONE ENCOUNTER
Gilbert Luu MD  [facility] Clinical Staff  This is slight elevated as would suggest some blood loss  and should see gi again    Please inform

## 2024-11-12 ENCOUNTER — OFFICE VISIT (OUTPATIENT)
Dept: FAMILY MEDICINE CLINIC | Age: 69
End: 2024-11-12

## 2024-11-12 VITALS
SYSTOLIC BLOOD PRESSURE: 128 MMHG | RESPIRATION RATE: 16 BRPM | DIASTOLIC BLOOD PRESSURE: 78 MMHG | HEART RATE: 72 BPM | WEIGHT: 164 LBS | BODY MASS INDEX: 26.36 KG/M2 | HEIGHT: 66 IN

## 2024-11-12 DIAGNOSIS — M47.812 DEGENERATIVE JOINT DISEASE OF CERVICAL AND LUMBAR SPINE: Chronic | ICD-10-CM

## 2024-11-12 DIAGNOSIS — I10 ESSENTIAL HYPERTENSION: Primary | ICD-10-CM

## 2024-11-12 DIAGNOSIS — M47.816 DEGENERATIVE JOINT DISEASE OF CERVICAL AND LUMBAR SPINE: Chronic | ICD-10-CM

## 2024-11-12 DIAGNOSIS — M54.50 CHRONIC MIDLINE LOW BACK PAIN WITHOUT SCIATICA: ICD-10-CM

## 2024-11-12 DIAGNOSIS — G89.29 CHRONIC MIDLINE LOW BACK PAIN WITHOUT SCIATICA: ICD-10-CM

## 2024-11-12 DIAGNOSIS — M25.562 CHRONIC PAIN OF BOTH KNEES: ICD-10-CM

## 2024-11-12 DIAGNOSIS — R60.0 EDEMA OF BOTH LOWER LEGS DUE TO PERIPHERAL VENOUS INSUFFICIENCY: ICD-10-CM

## 2024-11-12 DIAGNOSIS — I87.2 EDEMA OF BOTH LOWER LEGS DUE TO PERIPHERAL VENOUS INSUFFICIENCY: ICD-10-CM

## 2024-11-12 DIAGNOSIS — J30.1 NON-SEASONAL ALLERGIC RHINITIS DUE TO POLLEN: ICD-10-CM

## 2024-11-12 DIAGNOSIS — Z23 NEED FOR INFLUENZA VACCINATION: ICD-10-CM

## 2024-11-12 DIAGNOSIS — G89.29 CHRONIC PAIN OF BOTH KNEES: ICD-10-CM

## 2024-11-12 DIAGNOSIS — M25.561 CHRONIC PAIN OF BOTH KNEES: ICD-10-CM

## 2024-11-12 DIAGNOSIS — I73.00 RAYNAUD'S PHENOMENON WITHOUT GANGRENE: ICD-10-CM

## 2024-11-12 RX ORDER — TRAMADOL HYDROCHLORIDE 50 MG/1
100 TABLET ORAL EVERY 8 HOURS PRN
Qty: 180 TABLET | Refills: 0 | Status: SHIPPED | OUTPATIENT
Start: 2024-11-12 | End: 2024-12-12

## 2024-11-12 RX ORDER — IPRATROPIUM BROMIDE 42 UG/1
SPRAY, METERED NASAL
Qty: 3 EACH | Refills: 1 | Status: SHIPPED | OUTPATIENT
Start: 2024-11-12

## 2024-11-12 ASSESSMENT — ENCOUNTER SYMPTOMS
WHEEZING: 0
ABDOMINAL PAIN: 0
CHEST TIGHTNESS: 0
COUGH: 0
CONSTIPATION: 0
SORE THROAT: 0
EYE PAIN: 0
SHORTNESS OF BREATH: 0
NAUSEA: 0

## 2024-11-12 NOTE — PROGRESS NOTES
Immunizations Administered       Name Date Dose Route    Influenza, AFLURIA, FLUZONE, (age4 y+), IM, Trivalent MDV, 0.5mL 11/12/2024 0.5 mL Intramuscular    Site: Deltoid- Left    Lot: [id number]    NDC: 16942-848-42          
minutes as needed for Chest pain. 1 Bottle 1    Cholecalciferol (VITAMIN D3) 3000 UNITS TABS Take 5,000 Units by mouth daily.      folic acid (FOLVITE) 1 MG tablet Take 1 tablet by mouth daily      estrogens, conjugated, (PREMARIN) 0.9 MG tablet Take 1 tablet by mouth daily      Ascorbic Acid (VITAMIN C) 500 MG tablet Take 2 tablets by mouth daily       No current facility-administered medications for this visit.      Orders Placed This Encounter   Procedures    Influenza, FLUZONE Trivalent, (age 3 y+), IM, Multi-Dose Vial, 0.5mL    CBC with Auto Differential     Standing Status:   Future     Standing Expiration Date:   11/12/2025    TSH with Reflex     Standing Status:   Future     Standing Expiration Date:   11/12/2025    Lipid Panel     Standing Status:   Future     Standing Expiration Date:   11/12/2025     Order Specific Question:   Is Patient Fasting?/# of Hours     Answer:   12 hours     Order Specific Question:   Has the patient fasted?     Answer:   Yes    Comprehensive Metabolic Panel     Standing Status:   Future     Standing Expiration Date:   11/12/2025        See in 3  mths           Gilbert Luu MD

## 2024-11-22 DIAGNOSIS — E87.6 HYPOKALEMIA: ICD-10-CM

## 2024-11-25 RX ORDER — POTASSIUM CHLORIDE 750 MG/1
CAPSULE, EXTENDED RELEASE ORAL
Qty: 180 CAPSULE | Refills: 1 | Status: SHIPPED | OUTPATIENT
Start: 2024-11-25

## 2024-11-25 NOTE — TELEPHONE ENCOUNTER
Date of last visit:  11/12/2024  Date of next visit:  2/14/2025    Requested Prescriptions     Pending Prescriptions Disp Refills    potassium chloride (MICRO-K) 10 MEQ extended release capsule [Pharmacy Med Name: POTASSIUM CL ER 10 MEQ CAPSULE] 180 capsule 1     Sig: TAKE 1 CAPSULE BY MOUTH TWICE A DAY

## 2024-12-04 ENCOUNTER — TELEPHONE (OUTPATIENT)
Dept: FAMILY MEDICINE CLINIC | Age: 69
End: 2024-12-04

## 2024-12-04 DIAGNOSIS — M54.50 CHRONIC MIDLINE LOW BACK PAIN WITHOUT SCIATICA: ICD-10-CM

## 2024-12-04 DIAGNOSIS — M47.816 DEGENERATIVE JOINT DISEASE OF CERVICAL AND LUMBAR SPINE: Primary | ICD-10-CM

## 2024-12-04 DIAGNOSIS — M47.812 DEGENERATIVE JOINT DISEASE OF CERVICAL AND LUMBAR SPINE: Primary | ICD-10-CM

## 2024-12-04 DIAGNOSIS — G89.29 CHRONIC MIDLINE LOW BACK PAIN WITHOUT SCIATICA: ICD-10-CM

## 2024-12-04 NOTE — TELEPHONE ENCOUNTER
Patient called and requested a Script for PDN for Low Back Pain and Right Hand Arthritis     Pharmacy: [facility] on [address]  Please advise?

## 2024-12-05 RX ORDER — PREDNISONE 20 MG/1
TABLET ORAL
Qty: 24 TABLET | Refills: 0 | Status: SHIPPED | OUTPATIENT
Start: 2024-12-05

## 2024-12-10 DIAGNOSIS — M54.50 CHRONIC MIDLINE LOW BACK PAIN WITHOUT SCIATICA: ICD-10-CM

## 2024-12-10 DIAGNOSIS — M47.812 DEGENERATIVE JOINT DISEASE OF CERVICAL AND LUMBAR SPINE: Chronic | ICD-10-CM

## 2024-12-10 DIAGNOSIS — M47.816 DEGENERATIVE JOINT DISEASE OF CERVICAL AND LUMBAR SPINE: Chronic | ICD-10-CM

## 2024-12-10 DIAGNOSIS — M25.562 CHRONIC PAIN OF BOTH KNEES: ICD-10-CM

## 2024-12-10 DIAGNOSIS — G89.29 CHRONIC MIDLINE LOW BACK PAIN WITHOUT SCIATICA: ICD-10-CM

## 2024-12-10 DIAGNOSIS — G89.29 CHRONIC PAIN OF BOTH KNEES: ICD-10-CM

## 2024-12-10 DIAGNOSIS — M25.561 CHRONIC PAIN OF BOTH KNEES: ICD-10-CM

## 2024-12-10 NOTE — TELEPHONE ENCOUNTER
Date of last visit:  11/12/2024  Date of next visit:  2/14/2025    Requested Prescriptions     Pending Prescriptions Disp Refills    traMADol (ULTRAM) 50 MG tablet 180 tablet 0     Sig: Take 2 tablets by mouth every 8 hours as needed for Pain for up to 30 days.

## 2024-12-11 RX ORDER — TRAMADOL HYDROCHLORIDE 50 MG/1
100 TABLET ORAL EVERY 8 HOURS PRN
Qty: 180 TABLET | Refills: 0 | Status: SHIPPED | OUTPATIENT
Start: 2024-12-11 | End: 2025-01-10

## 2024-12-13 ENCOUNTER — LAB (OUTPATIENT)
Dept: LAB | Age: 69
End: 2024-12-13

## 2024-12-13 DIAGNOSIS — I10 ESSENTIAL HYPERTENSION: ICD-10-CM

## 2024-12-13 LAB
ALBUMIN SERPL BCG-MCNC: 3.5 G/DL (ref 3.5–5.1)
ALP SERPL-CCNC: 65 U/L (ref 38–126)
ALT SERPL W/O P-5'-P-CCNC: 15 U/L (ref 11–66)
ANION GAP SERPL CALC-SCNC: 9 MEQ/L (ref 8–16)
AST SERPL-CCNC: 23 U/L (ref 5–40)
BASOPHILS ABSOLUTE: 0 THOU/MM3 (ref 0–0.1)
BASOPHILS NFR BLD AUTO: 0.3 %
BILIRUB SERPL-MCNC: 0.2 MG/DL (ref 0.3–1.2)
BUN SERPL-MCNC: 13 MG/DL (ref 7–22)
CALCIUM SERPL-MCNC: 9 MG/DL (ref 8.5–10.5)
CHLORIDE SERPL-SCNC: 100 MEQ/L (ref 98–111)
CHOLEST SERPL-MCNC: 246 MG/DL (ref 100–199)
CO2 SERPL-SCNC: 31 MEQ/L (ref 23–33)
CREAT SERPL-MCNC: 0.6 MG/DL (ref 0.4–1.2)
DEPRECATED RDW RBC AUTO: 57.1 FL (ref 35–45)
EOSINOPHIL NFR BLD AUTO: 1.5 %
EOSINOPHILS ABSOLUTE: 0.1 THOU/MM3 (ref 0–0.4)
ERYTHROCYTE [DISTWIDTH] IN BLOOD BY AUTOMATED COUNT: 18.4 % (ref 11.5–14.5)
GFR SERPL CREATININE-BSD FRML MDRD: > 90 ML/MIN/1.73M2
GLUCOSE SERPL-MCNC: 74 MG/DL (ref 70–108)
HCT VFR BLD AUTO: 32.3 % (ref 37–47)
HDLC SERPL-MCNC: 97 MG/DL
HGB BLD-MCNC: 9.8 GM/DL (ref 12–16)
IMM GRANULOCYTES # BLD AUTO: 0.08 THOU/MM3 (ref 0–0.07)
IMM GRANULOCYTES NFR BLD AUTO: 1.2 %
LDLC SERPL CALC-MCNC: 127 MG/DL
LYMPHOCYTES ABSOLUTE: 2.3 THOU/MM3 (ref 1–4.8)
LYMPHOCYTES NFR BLD AUTO: 35.4 %
MCH RBC QN AUTO: 26.1 PG (ref 26–33)
MCHC RBC AUTO-ENTMCNC: 30.3 GM/DL (ref 32.2–35.5)
MCV RBC AUTO: 85.9 FL (ref 81–99)
MONOCYTES ABSOLUTE: 0.7 THOU/MM3 (ref 0.4–1.3)
MONOCYTES NFR BLD AUTO: 10.9 %
NEUTROPHILS ABSOLUTE: 3.3 THOU/MM3 (ref 1.8–7.7)
NEUTROPHILS NFR BLD AUTO: 50.7 %
NRBC BLD AUTO-RTO: 0 /100 WBC
PLATELET # BLD AUTO: 316 THOU/MM3 (ref 130–400)
PMV BLD AUTO: 9.4 FL (ref 9.4–12.4)
POTASSIUM SERPL-SCNC: 3.1 MEQ/L (ref 3.5–5.2)
PROT SERPL-MCNC: 5.6 G/DL (ref 6.1–8)
RBC # BLD AUTO: 3.76 MILL/MM3 (ref 4.2–5.4)
SODIUM SERPL-SCNC: 140 MEQ/L (ref 135–145)
TRIGL SERPL-MCNC: 111 MG/DL (ref 0–199)
TSH SERPL DL<=0.005 MIU/L-ACNC: 1.77 UIU/ML (ref 0.4–4.2)
WBC # BLD AUTO: 6.6 THOU/MM3 (ref 4.8–10.8)

## 2024-12-16 ENCOUNTER — TELEPHONE (OUTPATIENT)
Dept: FAMILY MEDICINE CLINIC | Age: 69
End: 2024-12-16

## 2024-12-16 NOTE — TELEPHONE ENCOUNTER
----- Message from Dr. Gilbert Luu MD sent at 12/15/2024  5:04 PM EST -----  Hgb still slight low so try to stay on iron pill    Continue on the potassium pill also  and cholesterol slight better

## 2024-12-23 DIAGNOSIS — M47.816 DEGENERATIVE JOINT DISEASE OF CERVICAL AND LUMBAR SPINE: Chronic | ICD-10-CM

## 2024-12-23 DIAGNOSIS — M47.812 DEGENERATIVE JOINT DISEASE OF CERVICAL AND LUMBAR SPINE: Chronic | ICD-10-CM

## 2024-12-23 NOTE — TELEPHONE ENCOUNTER
Date of last visit:  11/12/2024  Date of next visit:  2/14/2025    Requested Prescriptions     Pending Prescriptions Disp Refills    gabapentin (NEURONTIN) 300 MG capsule 60 capsule 4     Sig: Take 1 capsule by mouth 2 times daily for 150 days.

## 2024-12-24 RX ORDER — GABAPENTIN 300 MG/1
300 CAPSULE ORAL 2 TIMES DAILY
Qty: 60 CAPSULE | Refills: 4 | Status: SHIPPED | OUTPATIENT
Start: 2024-12-24 | End: 2025-05-23

## 2025-01-09 DIAGNOSIS — M47.816 DEGENERATIVE JOINT DISEASE OF CERVICAL AND LUMBAR SPINE: Chronic | ICD-10-CM

## 2025-01-09 DIAGNOSIS — G89.29 CHRONIC MIDLINE LOW BACK PAIN WITHOUT SCIATICA: ICD-10-CM

## 2025-01-09 DIAGNOSIS — M25.562 CHRONIC PAIN OF BOTH KNEES: ICD-10-CM

## 2025-01-09 DIAGNOSIS — M25.561 CHRONIC PAIN OF BOTH KNEES: ICD-10-CM

## 2025-01-09 DIAGNOSIS — M54.50 CHRONIC MIDLINE LOW BACK PAIN WITHOUT SCIATICA: ICD-10-CM

## 2025-01-09 DIAGNOSIS — G89.29 CHRONIC PAIN OF BOTH KNEES: ICD-10-CM

## 2025-01-09 DIAGNOSIS — M47.812 DEGENERATIVE JOINT DISEASE OF CERVICAL AND LUMBAR SPINE: Chronic | ICD-10-CM

## 2025-01-09 RX ORDER — TRAMADOL HYDROCHLORIDE 50 MG/1
100 TABLET ORAL EVERY 8 HOURS PRN
Qty: 180 TABLET | Refills: 0 | Status: SHIPPED | OUTPATIENT
Start: 2025-01-09 | End: 2025-02-08

## 2025-01-22 ENCOUNTER — OFFICE VISIT (OUTPATIENT)
Dept: FAMILY MEDICINE CLINIC | Age: 70
End: 2025-01-22

## 2025-01-22 VITALS
HEART RATE: 74 BPM | WEIGHT: 162 LBS | HEIGHT: 66 IN | SYSTOLIC BLOOD PRESSURE: 152 MMHG | BODY MASS INDEX: 26.03 KG/M2 | DIASTOLIC BLOOD PRESSURE: 84 MMHG | RESPIRATION RATE: 16 BRPM

## 2025-01-22 DIAGNOSIS — D64.9 ANEMIA, UNSPECIFIED TYPE: ICD-10-CM

## 2025-01-22 DIAGNOSIS — I89.0 LYMPHEDEMA OF LEG: Primary | ICD-10-CM

## 2025-01-22 DIAGNOSIS — K21.9 GASTROESOPHAGEAL REFLUX DISEASE WITHOUT ESOPHAGITIS: ICD-10-CM

## 2025-01-22 DIAGNOSIS — I10 ESSENTIAL HYPERTENSION: ICD-10-CM

## 2025-01-22 DIAGNOSIS — M25.562 CHRONIC PAIN OF BOTH KNEES: ICD-10-CM

## 2025-01-22 DIAGNOSIS — G89.29 CHRONIC PAIN OF BOTH KNEES: ICD-10-CM

## 2025-01-22 DIAGNOSIS — M25.561 CHRONIC PAIN OF BOTH KNEES: ICD-10-CM

## 2025-01-22 SDOH — ECONOMIC STABILITY: FOOD INSECURITY: WITHIN THE PAST 12 MONTHS, YOU WORRIED THAT YOUR FOOD WOULD RUN OUT BEFORE YOU GOT MONEY TO BUY MORE.: NEVER TRUE

## 2025-01-22 SDOH — ECONOMIC STABILITY: FOOD INSECURITY: WITHIN THE PAST 12 MONTHS, THE FOOD YOU BOUGHT JUST DIDN'T LAST AND YOU DIDN'T HAVE MONEY TO GET MORE.: NEVER TRUE

## 2025-01-22 ASSESSMENT — ENCOUNTER SYMPTOMS
ABDOMINAL PAIN: 0
SORE THROAT: 0
EYE PAIN: 0
WHEEZING: 0
NAUSEA: 0
CONSTIPATION: 0
SHORTNESS OF BREATH: 0
COUGH: 0
CHEST TIGHTNESS: 0

## 2025-01-22 ASSESSMENT — PATIENT HEALTH QUESTIONNAIRE - PHQ9: DEPRESSION UNABLE TO ASSESS: PT REFUSES

## 2025-01-22 NOTE — PROGRESS NOTES
Subjective   Patient ID: Corinne L Rudy is a 69 y.o. female.      Leg  edema     noted  and   swelling  noted            Hypertension  This is a chronic problem. The current episode started more than 1 year ago. The problem has been resolved since onset. The problem is controlled. Pertinent negatives include no chest pain, headaches, neck pain, palpitations or shortness of breath. The current treatment provides significant improvement. There are no compliance problems.    Edema  This is a recurrent problem. The current episode started in the past 7 days. The problem has been gradually worsening. Pertinent negatives include no abdominal pain, arthralgias, chest pain, congestion, coughing, fatigue, fever, headaches, joint swelling, nausea, neck pain, numbness, rash, sore throat or weakness. Nothing aggravates the symptoms. The treatment provided mild relief.     Past Medical History:   Diagnosis Date    Acid reflux     Arthritis     Bleeding tendency (HCC)     Cancer (HCC) 1989    cervical    Gallbladder disease 2010    History of scarlet fever     Hyperlipidemia     Hypertension     Joint pain     Medication intolerance     VERY SENSITIVE TO MEDICATIONS    Medication intolerance     VERY SENSITIVE TO MEDICATIONS     Swelling      Past Surgical History:   Procedure Laterality Date    BACK SURGERY      BACK SURGERY  01/11/2019    Dr Paez @ [facility]       L1  to  L5  with    rods     BLADDER REPAIR  2006    CARDIAC CATHETERIZATION  2009     [facility]  and  dr. mcqueen    CARDIOVASCULAR STRESS TEST  07/30/2009    EF 55% C NO OBVIOUS ISCHEMIA    CARDIOVASCULAR STRESS TEST  09/02/2005    EF 66% C MILD APICAL STRESS INDUCED ISCHEMIA    CERVICAL FUSION  11/22/2013    Dr. Paez @ [facility], 4-5, 5-6, 6-7     COLONOSCOPY  10/2022      normal  and good   10 year s    ESOPHAGOGASTRODUODENOSCOPY  10/2022    mild gastritis    dr nogueira    HYSTERECTOMY (CERVIX STATUS UNKNOWN)  1989    [facility]  [facility]    JOINT REPLACEMENT Right 06/13/2017

## 2025-01-22 NOTE — PROGRESS NOTES
Called Dr Horta's office (#: [phone]) and they requested we fax the Referral and Records to Fax #: [phone]. They will review the Records and contact the patient with an appt day and time. Referral and Records faxed.

## 2025-01-28 ENCOUNTER — TRANSCRIBE ORDERS (OUTPATIENT)
Dept: ADMINISTRATIVE | Age: 70
End: 2025-01-28

## 2025-01-28 DIAGNOSIS — Z12.31 ENCOUNTER FOR SCREENING MAMMOGRAM FOR MALIGNANT NEOPLASM OF BREAST: Primary | ICD-10-CM

## 2025-02-10 DIAGNOSIS — K21.9 GASTROESOPHAGEAL REFLUX DISEASE WITHOUT ESOPHAGITIS: ICD-10-CM

## 2025-02-10 DIAGNOSIS — M25.562 CHRONIC PAIN OF BOTH KNEES: ICD-10-CM

## 2025-02-10 DIAGNOSIS — M47.812 DEGENERATIVE JOINT DISEASE OF CERVICAL AND LUMBAR SPINE: Chronic | ICD-10-CM

## 2025-02-10 DIAGNOSIS — M54.50 CHRONIC MIDLINE LOW BACK PAIN WITHOUT SCIATICA: ICD-10-CM

## 2025-02-10 DIAGNOSIS — M25.561 CHRONIC PAIN OF BOTH KNEES: ICD-10-CM

## 2025-02-10 DIAGNOSIS — G89.29 CHRONIC MIDLINE LOW BACK PAIN WITHOUT SCIATICA: ICD-10-CM

## 2025-02-10 DIAGNOSIS — M47.816 DEGENERATIVE JOINT DISEASE OF CERVICAL AND LUMBAR SPINE: Chronic | ICD-10-CM

## 2025-02-10 DIAGNOSIS — G89.29 CHRONIC PAIN OF BOTH KNEES: ICD-10-CM

## 2025-02-10 DIAGNOSIS — E87.6 HYPOKALEMIA: ICD-10-CM

## 2025-02-10 NOTE — TELEPHONE ENCOUNTER
Date of last visit:  1/22/2025  Date of next visit:  2/14/2025    Requested Prescriptions     Pending Prescriptions Disp Refills    potassium chloride (MICRO-K) 10 MEQ extended release capsule 180 capsule 1     Sig: TAKE 1 CAPSULE BY MOUTH TWICE A DAY    esomeprazole (NEXIUM) 40 MG delayed release capsule 180 capsule 1     Sig: TAKE 1 CAPSULE BY MOUTH TWICE A DAY    traMADol (ULTRAM) 50 MG tablet 180 tablet 0     Sig: Take 2 tablets by mouth every 8 hours as needed for Pain for up to 30 days.

## 2025-02-11 RX ORDER — ESOMEPRAZOLE MAGNESIUM 40 MG/1
CAPSULE, DELAYED RELEASE ORAL
Qty: 180 CAPSULE | Refills: 1 | Status: SHIPPED | OUTPATIENT
Start: 2025-02-11

## 2025-02-11 RX ORDER — TRAMADOL HYDROCHLORIDE 50 MG/1
100 TABLET ORAL EVERY 8 HOURS PRN
Qty: 180 TABLET | Refills: 0 | Status: SHIPPED | OUTPATIENT
Start: 2025-02-11 | End: 2025-03-13

## 2025-02-11 RX ORDER — POTASSIUM CHLORIDE 750 MG/1
CAPSULE, EXTENDED RELEASE ORAL
Qty: 180 CAPSULE | Refills: 1 | Status: SHIPPED | OUTPATIENT
Start: 2025-02-11

## 2025-02-14 ENCOUNTER — OFFICE VISIT (OUTPATIENT)
Dept: FAMILY MEDICINE CLINIC | Age: 70
End: 2025-02-14

## 2025-02-14 VITALS
WEIGHT: 162 LBS | DIASTOLIC BLOOD PRESSURE: 84 MMHG | BODY MASS INDEX: 26.03 KG/M2 | SYSTOLIC BLOOD PRESSURE: 126 MMHG | HEIGHT: 66 IN | HEART RATE: 72 BPM | RESPIRATION RATE: 16 BRPM

## 2025-02-14 DIAGNOSIS — I10 ESSENTIAL HYPERTENSION: Primary | ICD-10-CM

## 2025-02-14 DIAGNOSIS — I89.0 LYMPHEDEMA OF LEG: ICD-10-CM

## 2025-02-14 DIAGNOSIS — G89.29 CHRONIC MIDLINE LOW BACK PAIN WITHOUT SCIATICA: ICD-10-CM

## 2025-02-14 DIAGNOSIS — M25.561 CHRONIC PAIN OF BOTH KNEES: ICD-10-CM

## 2025-02-14 DIAGNOSIS — G89.29 CHRONIC PAIN OF BOTH KNEES: ICD-10-CM

## 2025-02-14 DIAGNOSIS — M54.50 CHRONIC MIDLINE LOW BACK PAIN WITHOUT SCIATICA: ICD-10-CM

## 2025-02-14 DIAGNOSIS — K21.9 GASTROESOPHAGEAL REFLUX DISEASE WITHOUT ESOPHAGITIS: ICD-10-CM

## 2025-02-14 DIAGNOSIS — M19.90 ARTHRITIS: ICD-10-CM

## 2025-02-14 DIAGNOSIS — M47.812 DEGENERATIVE JOINT DISEASE OF CERVICAL AND LUMBAR SPINE: Chronic | ICD-10-CM

## 2025-02-14 DIAGNOSIS — M47.816 DEGENERATIVE JOINT DISEASE OF CERVICAL AND LUMBAR SPINE: Chronic | ICD-10-CM

## 2025-02-14 DIAGNOSIS — M25.562 CHRONIC PAIN OF BOTH KNEES: ICD-10-CM

## 2025-02-14 ASSESSMENT — ENCOUNTER SYMPTOMS
ABDOMINAL PAIN: 0
SORE THROAT: 0
EYE PAIN: 0
CONSTIPATION: 0
GLOBUS SENSATION: 0
COUGH: 0
WHEEZING: 0
SHORTNESS OF BREATH: 0
NAUSEA: 0
CHEST TIGHTNESS: 0

## 2025-02-14 ASSESSMENT — PATIENT HEALTH QUESTIONNAIRE - PHQ9
2. FEELING DOWN, DEPRESSED OR HOPELESS: NOT AT ALL
SUM OF ALL RESPONSES TO PHQ QUESTIONS 1-9: 0
1. LITTLE INTEREST OR PLEASURE IN DOING THINGS: NOT AT ALL
SUM OF ALL RESPONSES TO PHQ QUESTIONS 1-9: 0
SUM OF ALL RESPONSES TO PHQ9 QUESTIONS 1 & 2: 0
SUM OF ALL RESPONSES TO PHQ QUESTIONS 1-9: 0
SUM OF ALL RESPONSES TO PHQ QUESTIONS 1-9: 0

## 2025-02-14 NOTE — PROGRESS NOTES
Subjective   Patient ID: Corinne L Rudy is a 69 y.o. female.       Back pain  noted    stable     Mammogram   noted  ?   Right  breast     Back and   knees   stable        Chronic edema        Hypertension  This is a chronic problem. The current episode started more than 1 year ago. The problem has been resolved since onset. The problem is controlled. Pertinent negatives include no chest pain, headaches, neck pain, palpitations or shortness of breath. The current treatment provides significant improvement. There are no compliance problems.    Gastroesophageal Reflux  She reports no abdominal pain, no chest pain, no coughing, no globus sensation, no nausea, no sore throat or no wheezing. This is a chronic problem. The problem occurs rarely. The problem has been resolved. The symptoms are aggravated by certain foods. Pertinent negatives include no fatigue. She has tried a PPI for the symptoms. The treatment provided significant relief.     Past Medical History:   Diagnosis Date    Acid reflux     Arthritis     Bleeding tendency (HCC)     Cancer (HCC) 1989    cervical    Gallbladder disease 2010    History of scarlet fever     Hyperlipidemia     Hypertension     Joint pain     Medication intolerance     VERY SENSITIVE TO MEDICATIONS    Medication intolerance     VERY SENSITIVE TO MEDICATIONS     Swelling         Review of Systems   Constitutional:  Negative for appetite change, fatigue and fever.   HENT:  Negative for congestion, ear pain, postnasal drip and sore throat.    Eyes:  Negative for pain and visual disturbance.   Respiratory:  Negative for cough, chest tightness, shortness of breath and wheezing.    Cardiovascular:  Negative for chest pain, palpitations and leg swelling.   Gastrointestinal:  Negative for abdominal pain, constipation and nausea.   Genitourinary:  Negative for dysuria and frequency.   Musculoskeletal:  Negative for arthralgias, joint swelling, neck pain and neck stiffness.   Skin:

## 2025-02-19 ENCOUNTER — OFFICE VISIT (OUTPATIENT)
Dept: CARDIOLOGY CLINIC | Age: 70
End: 2025-02-19
Payer: MEDICARE

## 2025-02-19 VITALS
HEIGHT: 66 IN | HEART RATE: 86 BPM | WEIGHT: 166 LBS | SYSTOLIC BLOOD PRESSURE: 158 MMHG | BODY MASS INDEX: 26.68 KG/M2 | DIASTOLIC BLOOD PRESSURE: 78 MMHG

## 2025-02-19 DIAGNOSIS — I10 PRIMARY HYPERTENSION: Primary | ICD-10-CM

## 2025-02-19 DIAGNOSIS — E78.01 FAMILIAL HYPERCHOLESTEROLEMIA: ICD-10-CM

## 2025-02-19 PROCEDURE — 1036F TOBACCO NON-USER: CPT | Performed by: NUCLEAR MEDICINE

## 2025-02-19 PROCEDURE — 99213 OFFICE O/P EST LOW 20 MIN: CPT | Performed by: NUCLEAR MEDICINE

## 2025-02-19 PROCEDURE — G8428 CUR MEDS NOT DOCUMENT: HCPCS | Performed by: NUCLEAR MEDICINE

## 2025-02-19 PROCEDURE — G8417 CALC BMI ABV UP PARAM F/U: HCPCS | Performed by: NUCLEAR MEDICINE

## 2025-02-19 PROCEDURE — 3077F SYST BP >= 140 MM HG: CPT | Performed by: NUCLEAR MEDICINE

## 2025-02-19 PROCEDURE — 3017F COLORECTAL CA SCREEN DOC REV: CPT | Performed by: NUCLEAR MEDICINE

## 2025-02-19 PROCEDURE — 1090F PRES/ABSN URINE INCON ASSESS: CPT | Performed by: NUCLEAR MEDICINE

## 2025-02-19 PROCEDURE — G8400 PT W/DXA NO RESULTS DOC: HCPCS | Performed by: NUCLEAR MEDICINE

## 2025-02-19 PROCEDURE — 1123F ACP DISCUSS/DSCN MKR DOCD: CPT | Performed by: NUCLEAR MEDICINE

## 2025-02-19 PROCEDURE — 3078F DIAST BP <80 MM HG: CPT | Performed by: NUCLEAR MEDICINE

## 2025-02-19 NOTE — PROGRESS NOTES
Pt here for fu   Pt states my chart med list is correct from last week     
Jeannine @ [facility]     SHOULDER ARTHROSCOPY Right     TONSILLECTOMY  1996    TOTAL KNEE ARTHROPLASTY Left 2010    analisa    TRANSTHORACIC ECHOCARDIOGRAM  2009    EF 50% MILD LT VENTRICULAR HYPERTROPHY,TRIVIAL MR AND TR, TRIVIAL AORTIC INSUFFICIENCY,DIASTOLIC DYSFUNCTION OF THE LT VENTRICLE    TRANSTHORACIC ECHOCARDIOGRAM  2005    EF 60% C TRACE MR AND MILD TR    UPPER GASTROINTESTINAL ENDOSCOPY      [facility]     Family History   Problem Relation Age of Onset    Heart Disease Mother     Hypertension Mother     Heart Disease Father     Hypertension Father     Stroke Father      Social History     Tobacco Use    Smoking status: Former     Current packs/day: 0.00     Average packs/day: 1 pack/day for 3.0 years (3.0 ttl pk-yrs)     Types: Cigarettes     Start date: 1972     Quit date: 1975     Years since quittin.3     Passive exposure: Past    Smokeless tobacco: Never   Substance Use Topics    Alcohol use: No      Current Outpatient Medications   Medication Sig Dispense Refill    potassium chloride (MICRO-K) 10 MEQ extended release capsule TAKE 1 CAPSULE BY MOUTH TWICE A  capsule 1    esomeprazole (NEXIUM) 40 MG delayed release capsule TAKE 1 CAPSULE BY MOUTH TWICE A  capsule 1    traMADol (ULTRAM) 50 MG tablet Take 2 tablets by mouth every 8 hours as needed for Pain for up to 30 days. 180 tablet 0    gabapentin (NEURONTIN) 300 MG capsule Take 1 capsule by mouth 2 times daily for 150 days. (Patient taking differently: Take 1 capsule by mouth 2 times daily. One tablet at bed time) 60 capsule 4    ipratropium (ATROVENT) 0.06 % nasal spray USE 2 SPRAY BY NASAL ROUTE TWICE DAILY. 3 each 1    oxaprozin (DAYPRO) 600 MG tablet TAKE 1 TABLET BY MOUTH TWICE A DAY 60 tablet 5    furosemide (LASIX) 20 MG tablet TAKE 1 TABLET BY MOUTH DAILY AS NEEDED (LEG EDEMA) (Patient taking differently: TAKE 1 TABLET BY MOUTH every other day) 90 tablet 1    hydroCHLOROthiazide (HYDRODIURIL) 25 MG tablet

## 2025-03-05 DIAGNOSIS — M47.812 DEGENERATIVE JOINT DISEASE OF CERVICAL AND LUMBAR SPINE: Chronic | ICD-10-CM

## 2025-03-05 DIAGNOSIS — M47.816 DEGENERATIVE JOINT DISEASE OF CERVICAL AND LUMBAR SPINE: Chronic | ICD-10-CM

## 2025-03-05 NOTE — TELEPHONE ENCOUNTER
Date of last visit:  2/14/2025  Date of next visit:  Visit date not found    Requested Prescriptions     Pending Prescriptions Disp Refills    oxaprozin (DAYPRO) 600 MG tablet [Pharmacy Med Name: OXAPROZIN 600 MG TABLET] 60 tablet 5     Sig: TAKE 1 TABLET BY MOUTH TWICE A DAY

## 2025-03-10 DIAGNOSIS — M47.812 DEGENERATIVE JOINT DISEASE OF CERVICAL AND LUMBAR SPINE: Chronic | ICD-10-CM

## 2025-03-10 DIAGNOSIS — M25.561 CHRONIC PAIN OF BOTH KNEES: ICD-10-CM

## 2025-03-10 DIAGNOSIS — M47.816 DEGENERATIVE JOINT DISEASE OF CERVICAL AND LUMBAR SPINE: Chronic | ICD-10-CM

## 2025-03-10 DIAGNOSIS — G89.29 CHRONIC MIDLINE LOW BACK PAIN WITHOUT SCIATICA: ICD-10-CM

## 2025-03-10 DIAGNOSIS — G89.29 CHRONIC PAIN OF BOTH KNEES: ICD-10-CM

## 2025-03-10 DIAGNOSIS — M54.50 CHRONIC MIDLINE LOW BACK PAIN WITHOUT SCIATICA: ICD-10-CM

## 2025-03-10 DIAGNOSIS — M25.562 CHRONIC PAIN OF BOTH KNEES: ICD-10-CM

## 2025-03-10 RX ORDER — TRAMADOL HYDROCHLORIDE 50 MG/1
100 TABLET ORAL EVERY 8 HOURS PRN
Qty: 180 TABLET | Refills: 0 | Status: SHIPPED | OUTPATIENT
Start: 2025-03-10 | End: 2025-04-09

## 2025-03-10 NOTE — TELEPHONE ENCOUNTER
Date of last visit:  2/14/2025  Date of next visit:  Visit date not found    Requested Prescriptions     Pending Prescriptions Disp Refills    traMADol (ULTRAM) 50 MG tablet 180 tablet 0     Sig: Take 2 tablets by mouth every 8 hours as needed for Pain for up to 30 days.

## 2025-03-20 DIAGNOSIS — M47.816 DEGENERATIVE JOINT DISEASE OF CERVICAL AND LUMBAR SPINE: Chronic | ICD-10-CM

## 2025-03-20 DIAGNOSIS — M47.812 DEGENERATIVE JOINT DISEASE OF CERVICAL AND LUMBAR SPINE: Chronic | ICD-10-CM

## 2025-03-20 NOTE — TELEPHONE ENCOUNTER
Date of last visit:  2/14/2025  Date of next visit:  Visit date not found    Requested Prescriptions     Pending Prescriptions Disp Refills    gabapentin (NEURONTIN) 300 MG capsule 60 capsule 4     Sig: Take 1 capsule by mouth 2 times daily for 150 days.

## 2025-03-21 RX ORDER — GABAPENTIN 300 MG/1
300 CAPSULE ORAL 2 TIMES DAILY
Qty: 60 CAPSULE | Refills: 4 | Status: SHIPPED | OUTPATIENT
Start: 2025-03-21 | End: 2025-08-18

## 2025-03-24 DIAGNOSIS — M54.50 CHRONIC MIDLINE LOW BACK PAIN WITHOUT SCIATICA: ICD-10-CM

## 2025-03-24 DIAGNOSIS — M47.816 DEGENERATIVE JOINT DISEASE OF CERVICAL AND LUMBAR SPINE: ICD-10-CM

## 2025-03-24 DIAGNOSIS — M47.812 DEGENERATIVE JOINT DISEASE OF CERVICAL AND LUMBAR SPINE: ICD-10-CM

## 2025-03-24 DIAGNOSIS — G89.29 CHRONIC MIDLINE LOW BACK PAIN WITHOUT SCIATICA: ICD-10-CM

## 2025-03-24 DIAGNOSIS — I10 ESSENTIAL HYPERTENSION: ICD-10-CM

## 2025-03-24 RX ORDER — HYDROCHLOROTHIAZIDE 25 MG/1
25 TABLET ORAL EVERY OTHER DAY
Qty: 45 TABLET | Refills: 1 | OUTPATIENT
Start: 2025-03-24

## 2025-03-24 RX ORDER — PREDNISONE 20 MG/1
TABLET ORAL
Qty: 24 TABLET | Refills: 0 | Status: SHIPPED | OUTPATIENT
Start: 2025-03-24

## 2025-03-24 NOTE — TELEPHONE ENCOUNTER
Date of last visit:  2/14/2025  Date of next visit:  Visit date not found    Requested Prescriptions     Signed Prescriptions Disp Refills    predniSONE (DELTASONE) 20 MG tablet 24 tablet 0     Sig: One tab po tid for 3 days then one  bid for 5 days then one a day  for 5 days     Authorizing Provider: ERLINDA BUCIO     Ordering User: FABBY KINCAID     Refused Prescriptions Disp Refills    hydroCHLOROthiazide (HYDRODIURIL) 25 MG tablet [Pharmacy Med Name: HYDROCHLOROTHIAZIDE 25 MG TAB] 45 tablet 1     Sig: TAKE 1 TABLET BY MOUTH EVERY OTHER DAY     Refused By: FABBY KINCAID     Reason for Refusal: Refill not appropriate       Patient did not want the Automatic Refill of HCTZ through the Pharmacy. Will call when she needs it refilled.     Patient requested PDN for Back Pain

## 2025-04-03 ENCOUNTER — TELEPHONE (OUTPATIENT)
Dept: FAMILY MEDICINE CLINIC | Age: 70
End: 2025-04-03

## 2025-04-03 NOTE — TELEPHONE ENCOUNTER
CORINNE RUDY (Key: [id number])  PA Case ID #: [id number]  Need Help? Call us at [phone]  Outcome: Approved today by [employer] NCPDP 2017  Your request has been approved  Effective Date: 1/1/2025  Authorization Expiration Date: 4/3/2026  Drug: NEXIUM 40 MG delayed-release capsules  ePA cloud logo  Form: [employer] Electronic PA Form (2017 NCPDP)

## 2025-04-07 DIAGNOSIS — G89.29 CHRONIC MIDLINE LOW BACK PAIN WITHOUT SCIATICA: ICD-10-CM

## 2025-04-07 DIAGNOSIS — M25.561 CHRONIC PAIN OF BOTH KNEES: ICD-10-CM

## 2025-04-07 DIAGNOSIS — M47.816 DEGENERATIVE JOINT DISEASE OF CERVICAL AND LUMBAR SPINE: Chronic | ICD-10-CM

## 2025-04-07 DIAGNOSIS — M54.50 CHRONIC MIDLINE LOW BACK PAIN WITHOUT SCIATICA: ICD-10-CM

## 2025-04-07 DIAGNOSIS — G89.29 CHRONIC PAIN OF BOTH KNEES: ICD-10-CM

## 2025-04-07 DIAGNOSIS — R60.0 PERIPHERAL EDEMA: ICD-10-CM

## 2025-04-07 DIAGNOSIS — I10 ESSENTIAL HYPERTENSION: ICD-10-CM

## 2025-04-07 DIAGNOSIS — M47.812 DEGENERATIVE JOINT DISEASE OF CERVICAL AND LUMBAR SPINE: Chronic | ICD-10-CM

## 2025-04-07 DIAGNOSIS — M25.562 CHRONIC PAIN OF BOTH KNEES: ICD-10-CM

## 2025-04-07 RX ORDER — TRAMADOL HYDROCHLORIDE 50 MG/1
100 TABLET ORAL EVERY 8 HOURS PRN
Qty: 180 TABLET | Refills: 0 | Status: SHIPPED | OUTPATIENT
Start: 2025-04-07 | End: 2025-05-07

## 2025-04-07 RX ORDER — HYDROCHLOROTHIAZIDE 25 MG/1
25 TABLET ORAL EVERY OTHER DAY
Qty: 45 TABLET | Refills: 1 | Status: SHIPPED | OUTPATIENT
Start: 2025-04-07

## 2025-04-07 RX ORDER — FUROSEMIDE 20 MG/1
TABLET ORAL
Qty: 45 TABLET | Refills: 1 | Status: SHIPPED | OUTPATIENT
Start: 2025-04-07

## 2025-04-07 NOTE — TELEPHONE ENCOUNTER
Date of last visit:  2/14/2025  Date of next visit:  Visit date not found    Requested Prescriptions     Pending Prescriptions Disp Refills    traMADol (ULTRAM) 50 MG tablet 180 tablet 0     Sig: Take 2 tablets by mouth every 8 hours as needed for Pain for up to 30 days.    furosemide (LASIX) 20 MG tablet 45 tablet 1     Sig: TAKE 1 TABLET BY MOUTH EVERY OTHER DAY    hydroCHLOROthiazide (HYDRODIURIL) 25 MG tablet 45 tablet 1     Sig: Take 1 tablet by mouth every other day

## 2025-04-10 ENCOUNTER — HOSPITAL ENCOUNTER (EMERGENCY)
Age: 70
Discharge: HOME | End: 2025-04-10
Attending: EMERGENCY MEDICINE
Payer: MEDICARE

## 2025-04-10 ENCOUNTER — APPOINTMENT (OUTPATIENT)
Dept: CT IMAGING | Age: 70
End: 2025-04-10
Payer: MEDICARE

## 2025-04-10 ENCOUNTER — APPOINTMENT (OUTPATIENT)
Dept: GENERAL RADIOLOGY | Age: 70
End: 2025-04-10
Payer: MEDICARE

## 2025-04-10 VITALS
TEMPERATURE: 98 F | HEART RATE: 99 BPM | DIASTOLIC BLOOD PRESSURE: 62 MMHG | OXYGEN SATURATION: 97 % | SYSTOLIC BLOOD PRESSURE: 135 MMHG | HEIGHT: 66 IN | WEIGHT: 156 LBS | BODY MASS INDEX: 25.07 KG/M2 | RESPIRATION RATE: 21 BRPM

## 2025-04-10 DIAGNOSIS — K52.9 COLITIS: Primary | ICD-10-CM

## 2025-04-10 LAB
ALBUMIN SERPL BCG-MCNC: 3.1 G/DL (ref 3.4–4.9)
ALP SERPL-CCNC: 108 U/L (ref 38–126)
ALT SERPL W/O P-5'-P-CCNC: 15 U/L (ref 10–35)
ANION GAP SERPL CALC-SCNC: 15 MEQ/L (ref 8–16)
AST SERPL-CCNC: 39 U/L (ref 10–35)
BACTERIA URNS QL MICRO: ABNORMAL /HPF
BASOPHILS ABSOLUTE: 0 THOU/MM3 (ref 0–0.1)
BASOPHILS NFR BLD AUTO: 0.1 %
BILIRUB CONJ SERPL-MCNC: 0.2 MG/DL (ref 0–0.2)
BILIRUB SERPL-MCNC: 0.4 MG/DL (ref 0.3–1.2)
BILIRUB UR QL STRIP.AUTO: NEGATIVE
BUN SERPL-MCNC: 47 MG/DL (ref 8–23)
CALCIUM SERPL-MCNC: 8.4 MG/DL (ref 8.8–10.2)
CASTS #/AREA URNS LPF: ABNORMAL /LPF
CASTS 2: ABNORMAL /LPF
CHARACTER UR: ABNORMAL
CHLORIDE SERPL-SCNC: 90 MEQ/L (ref 98–111)
CO2 SERPL-SCNC: 25 MEQ/L (ref 22–29)
COLOR, UA: YELLOW
CREAT SERPL-MCNC: 1.9 MG/DL (ref 0.5–0.9)
CRYSTALS URNS MICRO: ABNORMAL
DEPRECATED RDW RBC AUTO: 47.1 FL (ref 35–45)
EKG ATRIAL RATE: 101 BPM
EKG P AXIS: 7 DEGREES
EKG P-R INTERVAL: 132 MS
EKG Q-T INTERVAL: 370 MS
EKG QRS DURATION: 94 MS
EKG QTC CALCULATION (BAZETT): 479 MS
EKG R AXIS: 63 DEGREES
EKG T AXIS: 38 DEGREES
EKG VENTRICULAR RATE: 101 BPM
EOSINOPHIL NFR BLD AUTO: 0 %
EOSINOPHILS ABSOLUTE: 0 THOU/MM3 (ref 0–0.4)
EPITHELIAL CELLS, UA: ABNORMAL /HPF
ERYTHROCYTE [DISTWIDTH] IN BLOOD BY AUTOMATED COUNT: 15.6 % (ref 11.5–14.5)
GFR SERPL CREATININE-BSD FRML MDRD: 28 ML/MIN/1.73M2
GLUCOSE SERPL-MCNC: 96 MG/DL (ref 74–109)
GLUCOSE UR QL STRIP.AUTO: NEGATIVE MG/DL
HCT VFR BLD AUTO: 30.4 % (ref 37–47)
HGB BLD-MCNC: 9.7 GM/DL (ref 12–16)
HGB UR QL STRIP.AUTO: ABNORMAL
IMM GRANULOCYTES # BLD AUTO: 0.32 THOU/MM3 (ref 0–0.07)
IMM GRANULOCYTES NFR BLD AUTO: 1.4 %
KETONES UR QL STRIP.AUTO: NEGATIVE
LACTATE SERPL-SCNC: 1.7 MMOL/L (ref 0.5–2)
LIPASE SERPL-CCNC: 9 U/L (ref 13–60)
LYMPHOCYTES ABSOLUTE: 0.5 THOU/MM3 (ref 1–4.8)
LYMPHOCYTES NFR BLD AUTO: 2.3 %
MCH RBC QN AUTO: 26.1 PG (ref 26–33)
MCHC RBC AUTO-ENTMCNC: 31.9 GM/DL (ref 32.2–35.5)
MCV RBC AUTO: 81.9 FL (ref 81–99)
MISCELLANEOUS 2: ABNORMAL
MONOCYTES ABSOLUTE: 1 THOU/MM3 (ref 0.4–1.3)
MONOCYTES NFR BLD AUTO: 4.5 %
NEUTROPHILS ABSOLUTE: 20.7 THOU/MM3 (ref 1.8–7.7)
NEUTROPHILS NFR BLD AUTO: 91.7 %
NITRITE UR QL STRIP: NEGATIVE
NRBC BLD AUTO-RTO: 0 /100 WBC
NT-PROBNP SERPL IA-MCNC: 772 PG/ML (ref 0–124)
OSMOLALITY SERPL CALC.SUM OF ELEC: 272.9 MOSMOL/KG (ref 275–300)
PH UR STRIP.AUTO: 5.5 [PH] (ref 5–9)
PLATELET # BLD AUTO: 245 THOU/MM3 (ref 130–400)
PMV BLD AUTO: 9.2 FL (ref 9.4–12.4)
POTASSIUM SERPL-SCNC: 3 MEQ/L (ref 3.5–5.2)
PROT SERPL-MCNC: 5.5 G/DL (ref 6.4–8.3)
PROT UR STRIP.AUTO-MCNC: ABNORMAL MG/DL
RBC # BLD AUTO: 3.71 MILL/MM3 (ref 4.2–5.4)
RBC URINE: ABNORMAL /HPF
RENAL EPI CELLS #/AREA URNS HPF: ABNORMAL /[HPF]
SCAN OF BLOOD SMEAR: NORMAL
SODIUM SERPL-SCNC: 130 MEQ/L (ref 135–145)
SP GR UR REFRACT.AUTO: 1.01 (ref 1–1.03)
TROPONIN, HIGH SENSITIVITY: 31 NG/L (ref 0–12)
UROBILINOGEN, URINE: 0.2 EU/DL (ref 0–1)
WBC # BLD AUTO: 22.6 THOU/MM3 (ref 4.8–10.8)
WBC #/AREA URNS HPF: ABNORMAL /HPF
WBC #/AREA URNS HPF: NEGATIVE /[HPF]
YEAST LIKE FUNGI URNS QL MICRO: ABNORMAL

## 2025-04-10 PROCEDURE — 6370000000 HC RX 637 (ALT 250 FOR IP)

## 2025-04-10 PROCEDURE — 83690 ASSAY OF LIPASE: CPT

## 2025-04-10 PROCEDURE — 93010 ELECTROCARDIOGRAM REPORT: CPT | Performed by: NUCLEAR MEDICINE

## 2025-04-10 PROCEDURE — 99285 EMERGENCY DEPT VISIT HI MDM: CPT

## 2025-04-10 PROCEDURE — 2580000003 HC RX 258

## 2025-04-10 PROCEDURE — 6370000000 HC RX 637 (ALT 250 FOR IP): Performed by: EMERGENCY MEDICINE

## 2025-04-10 PROCEDURE — 85025 COMPLETE CBC W/AUTO DIFF WBC: CPT

## 2025-04-10 PROCEDURE — 36415 COLL VENOUS BLD VENIPUNCTURE: CPT

## 2025-04-10 PROCEDURE — 71046 X-RAY EXAM CHEST 2 VIEWS: CPT

## 2025-04-10 PROCEDURE — 83605 ASSAY OF LACTIC ACID: CPT

## 2025-04-10 PROCEDURE — 80053 COMPREHEN METABOLIC PANEL: CPT

## 2025-04-10 PROCEDURE — 74177 CT ABD & PELVIS W/CONTRAST: CPT

## 2025-04-10 PROCEDURE — 82248 BILIRUBIN DIRECT: CPT

## 2025-04-10 PROCEDURE — 84484 ASSAY OF TROPONIN QUANT: CPT

## 2025-04-10 PROCEDURE — 96361 HYDRATE IV INFUSION ADD-ON: CPT

## 2025-04-10 PROCEDURE — 81001 URINALYSIS AUTO W/SCOPE: CPT

## 2025-04-10 PROCEDURE — 83880 ASSAY OF NATRIURETIC PEPTIDE: CPT

## 2025-04-10 PROCEDURE — 6360000004 HC RX CONTRAST MEDICATION

## 2025-04-10 PROCEDURE — 96360 HYDRATION IV INFUSION INIT: CPT

## 2025-04-10 PROCEDURE — 93005 ELECTROCARDIOGRAM TRACING: CPT

## 2025-04-10 RX ORDER — 0.9 % SODIUM CHLORIDE 0.9 %
1000 INTRAVENOUS SOLUTION INTRAVENOUS ONCE
Status: COMPLETED | OUTPATIENT
Start: 2025-04-10 | End: 2025-04-10

## 2025-04-10 RX ORDER — CIPROFLOXACIN 500 MG/1
500 TABLET, FILM COATED ORAL 2 TIMES DAILY
Qty: 20 TABLET | Refills: 0 | Status: SHIPPED | OUTPATIENT
Start: 2025-04-10 | End: 2025-04-20

## 2025-04-10 RX ORDER — METRONIDAZOLE 500 MG/1
500 TABLET ORAL 2 TIMES DAILY
Qty: 14 TABLET | Refills: 0 | Status: SHIPPED | OUTPATIENT
Start: 2025-04-10 | End: 2025-04-17

## 2025-04-10 RX ORDER — IOPAMIDOL 755 MG/ML
80 INJECTION, SOLUTION INTRAVASCULAR
Status: COMPLETED | OUTPATIENT
Start: 2025-04-10 | End: 2025-04-10

## 2025-04-10 RX ADMIN — LIDOCAINE HYDROCHLORIDE: 20 SOLUTION ORAL at 12:24

## 2025-04-10 RX ADMIN — IOPAMIDOL 80 ML: 755 INJECTION, SOLUTION INTRAVENOUS at 12:13

## 2025-04-10 RX ADMIN — CEPHALEXIN 500 MG: 250 CAPSULE ORAL at 13:41

## 2025-04-10 RX ADMIN — SODIUM CHLORIDE 1000 ML: 0.9 INJECTION, SOLUTION INTRAVENOUS at 12:24

## 2025-04-10 ASSESSMENT — PAIN DESCRIPTION - FREQUENCY: FREQUENCY: CONTINUOUS

## 2025-04-10 ASSESSMENT — PAIN DESCRIPTION - ORIENTATION: ORIENTATION: LEFT

## 2025-04-10 ASSESSMENT — PAIN DESCRIPTION - LOCATION: LOCATION: ABDOMEN

## 2025-04-10 ASSESSMENT — PAIN DESCRIPTION - PAIN TYPE: TYPE: ACUTE PAIN

## 2025-04-10 ASSESSMENT — PAIN - FUNCTIONAL ASSESSMENT: PAIN_FUNCTIONAL_ASSESSMENT: 0-10

## 2025-04-10 ASSESSMENT — PAIN DESCRIPTION - ONSET: ONSET: ON-GOING

## 2025-04-10 ASSESSMENT — PAIN DESCRIPTION - DESCRIPTORS: DESCRIPTORS: SHARP

## 2025-04-10 ASSESSMENT — PAIN SCALES - GENERAL: PAINLEVEL_OUTOF10: 8

## 2025-04-10 NOTE — DISCHARGE INSTRUCTIONS
Please take your medications as prescribed.  Please try to obtain a urine sample and sent to lab.  Please follow-up with your GI physician for further workup of your colitis.

## 2025-04-10 NOTE — ED NOTES
Pt presents to ER for LLG abdominal pain. Pt states that she started 2 days ago with cramping and now she is having diarrhea and now sharp pains. Pt has been sweating, and having trouble taking a deep breath due to the pain. Pt denies any vomiting.

## 2025-04-10 NOTE — ED PROVIDER NOTES
I performed a history and physical examination of the patient and discussed management with the resident. I reviewed the resident’s note and agree with the documented findings and plan of care. Any areas of disagreement are noted on the chart. I was personally present for the key portions of any procedures. I have documented in the chart those procedures where I was not present during the key portions. I have reviewed the emergency nurses triage note. I agree with the chief complaint, past medical history, past surgical history, allergies, medications, social and family history as documented unless otherwise noted below. Documentation of the HPI, Physical Exam and Medical Decision Making performed by medical students or scribes is based on my personal performance of the HPI, PE and MDM. For Phys Assistant/ Nurse Practitioner cases/documentation I have personally evaluated this patient and have completed at least one if not all key elements of the E/M (history, physical exam, and MDM). My findings are as noted below.    In other words, I personally saw and examined the patient I have reviewed and agreed with the resident findings including all diagnostic interpretations and treatment plans as written.  I was present for the key portion of any procedures performed and the inclusive time noted in any critical care statement.    Patient presents today for abdominal cramping.  This is a 69-year-old female with extensive GI history coming in today complaining of diarrhea for the last 7 days.  She brought a stool sample in with her.  She states she has generalized cramping throughout her abdomen.  She does have a history of abdominal surgeries.  Patient states that she has been having diarrhea, there is no blood in the stool.  Patient has no real history of C. difficile in the past.  She states she has not had any fevers.  Patient recently was on a course of steroids for URI.  Here today the patient's physical exam reveals

## 2025-04-10 NOTE — ED PROVIDER NOTES
[facility] EMERGENCY DEPARTMENT  EMERGENCY DEPARTMENT ENCOUNTER          Pt Name: Corinne L Rudy  MRN: [id number]  Birthdate 1955  Date of evaluation: 4/10/2025  Physician: Sidra Cuevas MD  Supervising Attending Physician: Bolivar Boudreaux DO       CHIEF COMPLAINT     No chief complaint on file.        HISTORY OF PRESENT ILLNESS    HPI  Corinne L Rudy is a 69 y.o. female who presents to the emergency department from home for evaluation of abdominal cramping.  Patient has an extensive GI history and came in for diarrhea for the last 5 days.  Patient has a history of abdominal surgeries, had a hysterectomy due to cervical cancer.  Patient states that she has been having diarrhea since Monday with abdominal cramping, no blood in stool and has brought a stool sample with her.  Patient was recently on a course of steroids for upper respiratory tract infection which she tapered down and took her last dose yesterday.  Patient states that she is taking GERD medication but has not been taking her Carafate.  Patient also has a history of back pain and takes tramadol which she states she took prior to coming in.  Patient reports generalized abdominal pain more in the epigastric and the left upper quadrant regions.  Patient says her diarrhea has stopped, abdominal pain is more sharp in nature now.    Denies fever, chills, headache, lightheadedness, dizziness, vision changes, tinnitus, cough, congestion, sore throat, neck pain/stiffness, chest pain, shortness of breath, abdominal pain, nausea, vomiting, constipation, diarrhea, dysuria, blood in the urine or stool, numbness/tingling/weakness in extremities.    The patient has no other acute complaints at this time.      PAST MEDICAL AND SURGICAL HISTORY     Past Medical History:   Diagnosis Date    Acid reflux     Arthritis     Bleeding tendency     Cancer (HCC) 1989    cervical    Gallbladder disease 2010    History of scarlet fever     Hyperlipidemia

## 2025-05-01 ENCOUNTER — TELEPHONE (OUTPATIENT)
Dept: FAMILY MEDICINE CLINIC | Age: 70
End: 2025-05-01

## 2025-05-01 DIAGNOSIS — R10.84 GENERALIZED ABDOMINAL PAIN: Primary | ICD-10-CM

## 2025-05-01 NOTE — TELEPHONE ENCOUNTER
Patient stated GI recommended Dr Luu order repeat labs to make sure her levels came back to normal.     Please advise?  Lab: [facility]

## 2025-05-02 NOTE — TELEPHONE ENCOUNTER
Per Dr. Merrill, consult can be cancelled.  New orders from Dr. Merrill for patient to start Doxycycline 100 mg BID for 14 days and to stop Cephalexin.  Orders faxed to [facility] (Fax: [phone]).  Call to [facility] with update.    Message sent to update Dr. Ford's office.   Pt has seen GI she will get the labs next week.

## 2025-05-09 ENCOUNTER — TELEPHONE (OUTPATIENT)
Dept: FAMILY MEDICINE CLINIC | Age: 70
End: 2025-05-09

## 2025-05-09 DIAGNOSIS — R10.84 GENERALIZED ABDOMINAL PAIN: Primary | ICD-10-CM

## 2025-05-09 RX ORDER — HYOSCYAMINE SULFATE 0.12 MG/1
0.12 TABLET ORAL EVERY 4 HOURS PRN
Qty: 30 TABLET | Refills: 1 | Status: SHIPPED | OUTPATIENT
Start: 2025-05-09

## 2025-05-12 DIAGNOSIS — M54.50 CHRONIC MIDLINE LOW BACK PAIN WITHOUT SCIATICA: ICD-10-CM

## 2025-05-12 DIAGNOSIS — G89.29 CHRONIC MIDLINE LOW BACK PAIN WITHOUT SCIATICA: ICD-10-CM

## 2025-05-12 DIAGNOSIS — M25.561 CHRONIC PAIN OF BOTH KNEES: ICD-10-CM

## 2025-05-12 DIAGNOSIS — M47.812 DEGENERATIVE JOINT DISEASE OF CERVICAL AND LUMBAR SPINE: Chronic | ICD-10-CM

## 2025-05-12 DIAGNOSIS — G89.29 CHRONIC PAIN OF BOTH KNEES: ICD-10-CM

## 2025-05-12 DIAGNOSIS — M47.816 DEGENERATIVE JOINT DISEASE OF CERVICAL AND LUMBAR SPINE: Chronic | ICD-10-CM

## 2025-05-12 DIAGNOSIS — M25.562 CHRONIC PAIN OF BOTH KNEES: ICD-10-CM

## 2025-05-13 RX ORDER — TRAMADOL HYDROCHLORIDE 50 MG/1
100 TABLET ORAL EVERY 8 HOURS PRN
Qty: 180 TABLET | Refills: 0 | Status: SHIPPED | OUTPATIENT
Start: 2025-05-13 | End: 2025-06-12

## 2025-05-15 ENCOUNTER — LAB (OUTPATIENT)
Dept: LAB | Age: 70
End: 2025-05-15

## 2025-05-15 DIAGNOSIS — R10.84 GENERALIZED ABDOMINAL PAIN: ICD-10-CM

## 2025-05-15 LAB
ANION GAP SERPL CALC-SCNC: 13 MEQ/L (ref 8–16)
BASOPHILS ABSOLUTE: 0 THOU/MM3 (ref 0–0.1)
BASOPHILS NFR BLD AUTO: 0.7 %
BUN SERPL-MCNC: 11 MG/DL (ref 8–23)
CALCIUM SERPL-MCNC: 8.4 MG/DL (ref 8.8–10.2)
CHLORIDE SERPL-SCNC: 98 MEQ/L (ref 98–111)
CO2 SERPL-SCNC: 28 MEQ/L (ref 22–29)
CREAT SERPL-MCNC: 0.7 MG/DL (ref 0.5–0.9)
DEPRECATED RDW RBC AUTO: 52.4 FL (ref 35–45)
EOSINOPHIL NFR BLD AUTO: 1.4 %
EOSINOPHILS ABSOLUTE: 0.1 THOU/MM3 (ref 0–0.4)
ERYTHROCYTE [DISTWIDTH] IN BLOOD BY AUTOMATED COUNT: 17.5 % (ref 11.5–14.5)
GFR SERPL CREATININE-BSD FRML MDRD: > 90 ML/MIN/1.73M2
GLUCOSE SERPL-MCNC: 91 MG/DL (ref 74–109)
HCT VFR BLD AUTO: 29.3 % (ref 37–47)
HGB BLD-MCNC: 8.5 GM/DL (ref 12–16)
IMM GRANULOCYTES # BLD AUTO: 0.03 THOU/MM3 (ref 0–0.07)
IMM GRANULOCYTES NFR BLD AUTO: 0.5 %
LYMPHOCYTES ABSOLUTE: 2.1 THOU/MM3 (ref 1–4.8)
LYMPHOCYTES NFR BLD AUTO: 36.1 %
MCH RBC QN AUTO: 23.9 PG (ref 26–33)
MCHC RBC AUTO-ENTMCNC: 29 GM/DL (ref 32.2–35.5)
MCV RBC AUTO: 82.5 FL (ref 81–99)
MONOCYTES ABSOLUTE: 0.9 THOU/MM3 (ref 0.4–1.3)
MONOCYTES NFR BLD AUTO: 15.6 %
NEUTROPHILS ABSOLUTE: 2.7 THOU/MM3 (ref 1.8–7.7)
NEUTROPHILS NFR BLD AUTO: 45.7 %
NRBC BLD AUTO-RTO: 0 /100 WBC
PLATELET # BLD AUTO: 306 THOU/MM3 (ref 130–400)
PMV BLD AUTO: 10.4 FL (ref 9.4–12.4)
POTASSIUM SERPL-SCNC: 2.8 MEQ/L (ref 3.5–5.2)
RBC # BLD AUTO: 3.55 MILL/MM3 (ref 4.2–5.4)
SODIUM SERPL-SCNC: 139 MEQ/L (ref 135–145)
WBC # BLD AUTO: 5.9 THOU/MM3 (ref 4.8–10.8)

## 2025-05-16 ENCOUNTER — RESULTS FOLLOW-UP (OUTPATIENT)
Dept: FAMILY MEDICINE CLINIC | Age: 70
End: 2025-05-16

## 2025-05-16 NOTE — TELEPHONE ENCOUNTER
----- Message from Dr. Gilbert Luu MD sent at 5/16/2025  6:34 AM EDT -----  Potassium still very low and probably occurring with the loose stool    High potassium foods this includes bananas tomatoes potatoes and orange juice if can tolerate    Kidney function back to normal at 0.7    Still with anemia has slightly worse and if persistent pain needs back to see GI because of the bowel thickening consider colonoscopy

## 2025-06-12 DIAGNOSIS — M25.561 CHRONIC PAIN OF BOTH KNEES: ICD-10-CM

## 2025-06-12 DIAGNOSIS — M54.50 CHRONIC MIDLINE LOW BACK PAIN WITHOUT SCIATICA: ICD-10-CM

## 2025-06-12 DIAGNOSIS — M25.562 CHRONIC PAIN OF BOTH KNEES: ICD-10-CM

## 2025-06-12 DIAGNOSIS — G89.29 CHRONIC PAIN OF BOTH KNEES: ICD-10-CM

## 2025-06-12 DIAGNOSIS — M47.816 DEGENERATIVE JOINT DISEASE OF CERVICAL AND LUMBAR SPINE: Chronic | ICD-10-CM

## 2025-06-12 DIAGNOSIS — M47.812 DEGENERATIVE JOINT DISEASE OF CERVICAL AND LUMBAR SPINE: Chronic | ICD-10-CM

## 2025-06-12 DIAGNOSIS — G89.29 CHRONIC MIDLINE LOW BACK PAIN WITHOUT SCIATICA: ICD-10-CM

## 2025-06-13 RX ORDER — TRAMADOL HYDROCHLORIDE 50 MG/1
100 TABLET ORAL EVERY 8 HOURS PRN
Qty: 180 TABLET | Refills: 0 | Status: SHIPPED | OUTPATIENT
Start: 2025-06-13 | End: 2025-07-13

## 2025-07-16 DIAGNOSIS — M47.816 DEGENERATIVE JOINT DISEASE OF CERVICAL AND LUMBAR SPINE: Chronic | ICD-10-CM

## 2025-07-16 DIAGNOSIS — K21.9 GASTROESOPHAGEAL REFLUX DISEASE WITHOUT ESOPHAGITIS: ICD-10-CM

## 2025-07-16 DIAGNOSIS — M25.562 CHRONIC PAIN OF BOTH KNEES: ICD-10-CM

## 2025-07-16 DIAGNOSIS — M47.812 DEGENERATIVE JOINT DISEASE OF CERVICAL AND LUMBAR SPINE: Chronic | ICD-10-CM

## 2025-07-16 DIAGNOSIS — G89.29 CHRONIC PAIN OF BOTH KNEES: ICD-10-CM

## 2025-07-16 DIAGNOSIS — M54.50 CHRONIC MIDLINE LOW BACK PAIN WITHOUT SCIATICA: ICD-10-CM

## 2025-07-16 DIAGNOSIS — G89.29 CHRONIC MIDLINE LOW BACK PAIN WITHOUT SCIATICA: ICD-10-CM

## 2025-07-16 DIAGNOSIS — M25.561 CHRONIC PAIN OF BOTH KNEES: ICD-10-CM

## 2025-07-16 DIAGNOSIS — J30.1 NON-SEASONAL ALLERGIC RHINITIS DUE TO POLLEN: ICD-10-CM

## 2025-07-16 RX ORDER — ESOMEPRAZOLE MAGNESIUM 40 MG/1
CAPSULE, DELAYED RELEASE ORAL
Qty: 180 CAPSULE | Refills: 1 | Status: SHIPPED | OUTPATIENT
Start: 2025-07-16

## 2025-07-16 RX ORDER — TRAMADOL HYDROCHLORIDE 50 MG/1
100 TABLET ORAL EVERY 8 HOURS PRN
Qty: 180 TABLET | Refills: 0 | Status: SHIPPED | OUTPATIENT
Start: 2025-07-16 | End: 2025-08-15

## 2025-07-16 RX ORDER — IPRATROPIUM BROMIDE 42 UG/1
SPRAY, METERED NASAL
Qty: 3 EACH | Refills: 1 | Status: SHIPPED | OUTPATIENT
Start: 2025-07-16

## 2025-07-16 NOTE — TELEPHONE ENCOUNTER
Patient michael stating she is needing these refills to [facility] on [address]       Date of last visit:  2/14/2025  Date of next visit:  Visit date not found    Requested Prescriptions     Pending Prescriptions Disp Refills    ipratropium (ATROVENT) 0.06 % nasal spray 3 each 1     Sig: USE 2 SPRAY BY NASAL ROUTE TWICE DAILY.    esomeprazole (NEXIUM) 40 MG delayed release capsule 180 capsule 1     Sig: TAKE 1 CAPSULE BY MOUTH TWICE A DAY    traMADol (ULTRAM) 50 MG tablet 180 tablet 0     Sig: Take 2 tablets by mouth every 8 hours as needed for Pain for up to 30 days.

## 2025-07-22 ENCOUNTER — TRANSCRIBE ORDERS (OUTPATIENT)
Dept: ADMINISTRATIVE | Age: 70
End: 2025-07-22

## 2025-07-22 DIAGNOSIS — K52.9 COLITIS: Primary | ICD-10-CM

## 2025-08-14 DIAGNOSIS — G89.29 CHRONIC MIDLINE LOW BACK PAIN WITHOUT SCIATICA: ICD-10-CM

## 2025-08-14 DIAGNOSIS — M25.562 CHRONIC PAIN OF BOTH KNEES: ICD-10-CM

## 2025-08-14 DIAGNOSIS — M47.812 DEGENERATIVE JOINT DISEASE OF CERVICAL AND LUMBAR SPINE: Chronic | ICD-10-CM

## 2025-08-14 DIAGNOSIS — G89.29 CHRONIC PAIN OF BOTH KNEES: ICD-10-CM

## 2025-08-14 DIAGNOSIS — M47.816 DEGENERATIVE JOINT DISEASE OF CERVICAL AND LUMBAR SPINE: Chronic | ICD-10-CM

## 2025-08-14 DIAGNOSIS — M25.561 CHRONIC PAIN OF BOTH KNEES: ICD-10-CM

## 2025-08-14 DIAGNOSIS — M54.50 CHRONIC MIDLINE LOW BACK PAIN WITHOUT SCIATICA: ICD-10-CM

## 2025-08-14 RX ORDER — TRAMADOL HYDROCHLORIDE 50 MG/1
100 TABLET ORAL EVERY 8 HOURS PRN
Qty: 180 TABLET | Refills: 0 | Status: SHIPPED | OUTPATIENT
Start: 2025-08-14 | End: 2025-09-13

## 2025-08-15 ENCOUNTER — HOSPITAL ENCOUNTER (OUTPATIENT)
Dept: CT IMAGING | Age: 70
Discharge: HOME | End: 2025-08-15
Payer: MEDICARE

## 2025-08-15 DIAGNOSIS — K52.9 COLITIS: ICD-10-CM

## 2025-08-15 LAB — POC CREATININE WHOLE BLOOD: 0.5 MG/DL (ref 0.5–1.2)

## 2025-08-15 PROCEDURE — 82565 ASSAY OF CREATININE: CPT

## 2025-08-15 PROCEDURE — 6360000004 HC RX CONTRAST MEDICATION: Performed by: INTERNAL MEDICINE

## 2025-08-15 PROCEDURE — 74177 CT ABD & PELVIS W/CONTRAST: CPT

## 2025-08-15 RX ORDER — IOPAMIDOL 755 MG/ML
80 INJECTION, SOLUTION INTRAVASCULAR
Status: COMPLETED | OUTPATIENT
Start: 2025-08-15 | End: 2025-08-15

## 2025-08-15 RX ADMIN — IOPAMIDOL 80 ML: 755 INJECTION, SOLUTION INTRAVENOUS at 11:07

## 2025-08-15 RX ADMIN — IOHEXOL 50 ML: 240 INJECTION, SOLUTION INTRATHECAL; INTRAVASCULAR; INTRAVENOUS; ORAL at 11:07
